# Patient Record
Sex: FEMALE | Race: WHITE | Employment: OTHER | URBAN - METROPOLITAN AREA
[De-identification: names, ages, dates, MRNs, and addresses within clinical notes are randomized per-mention and may not be internally consistent; named-entity substitution may affect disease eponyms.]

---

## 2017-03-12 DIAGNOSIS — G89.4 CHRONIC PAIN SYNDROME: ICD-10-CM

## 2017-03-14 RX ORDER — DULOXETIN HYDROCHLORIDE 30 MG/1
CAPSULE, DELAYED RELEASE ORAL
Qty: 90 CAPSULE | Refills: 1 | Status: SHIPPED | OUTPATIENT
Start: 2017-03-14 | End: 2017-08-15

## 2017-03-14 NOTE — TELEPHONE ENCOUNTER
DULoxetine (CYMBALTA) 30 MG EC capsule      Last Written Prescription Date: 12/17/2016  Last Fill Quantity: 90, # refills: 1  Last Office Visit with [facility], [facility] or [facility] prescribing provider: 12/14/2016        BP Readings from Last 3 Encounters:   12/15/16 128/89   12/14/16 124/86   10/28/16 137/89     Pulse: (for Fetzima)  Creatinine   Date Value Ref Range Status   10/24/2016 0.87 0.52 - 1.04 mg/dL Final   ]    Last PHQ-9 score on record=   PHQ-9 SCORE 10/24/2016   Total Score 11

## 2017-04-23 ENCOUNTER — MYC MEDICAL ADVICE (OUTPATIENT)
Dept: FAMILY MEDICINE | Facility: CLINIC | Age: 47
End: 2017-04-23

## 2017-04-25 ENCOUNTER — MYC MEDICAL ADVICE (OUTPATIENT)
Dept: FAMILY MEDICINE | Facility: CLINIC | Age: 47
End: 2017-04-25

## 2017-04-26 ENCOUNTER — OFFICE VISIT (OUTPATIENT)
Dept: FAMILY MEDICINE | Facility: CLINIC | Age: 47
End: 2017-04-26
Payer: COMMERCIAL

## 2017-04-26 ENCOUNTER — MYC MEDICAL ADVICE (OUTPATIENT)
Dept: FAMILY MEDICINE | Facility: CLINIC | Age: 47
End: 2017-04-26

## 2017-04-26 VITALS
SYSTOLIC BLOOD PRESSURE: 124 MMHG | WEIGHT: 196.3 LBS | TEMPERATURE: 98.5 F | HEART RATE: 84 BPM | BODY MASS INDEX: 34.78 KG/M2 | HEIGHT: 63 IN | DIASTOLIC BLOOD PRESSURE: 89 MMHG | OXYGEN SATURATION: 97 %

## 2017-04-26 DIAGNOSIS — G56.03 BILATERAL CARPAL TUNNEL SYNDROME: ICD-10-CM

## 2017-04-26 DIAGNOSIS — G57.53 TARSAL TUNNEL SYNDROME OF BOTH LOWER EXTREMITIES: ICD-10-CM

## 2017-04-26 DIAGNOSIS — G56.03 BILATERAL CARPAL TUNNEL SYNDROME: Primary | ICD-10-CM

## 2017-04-26 DIAGNOSIS — L81.9 HYPERPIGMENTATION: ICD-10-CM

## 2017-04-26 DIAGNOSIS — M79.7 FIBROMYALGIA: Primary | ICD-10-CM

## 2017-04-26 PROCEDURE — 99215 OFFICE O/P EST HI 40 MIN: CPT | Performed by: INTERNAL MEDICINE

## 2017-04-26 RX ORDER — DIAZEPAM 5 MG
5 TABLET ORAL EVERY 6 HOURS PRN
Qty: 30 TABLET | Refills: 0 | Status: SHIPPED | OUTPATIENT
Start: 2017-04-26 | End: 2018-04-30

## 2017-04-26 NOTE — MR AVS SNAPSHOT
After Visit Summary   4/26/2017    Maegan Lira    MRN: [id number]           Patient Information     Date Of Birth          1970        Visit Information        Provider Department      4/26/2017 12:30 PM Jelly Mcarthur,  [facility]        Today's Diagnoses     Fibromyalgia    -  1    Hyperpigmentation        Bilateral carpal tunnel syndrome        Tarsal tunnel syndrome of both lower extremities          Care Instructions      Schedule appointment with dermatologist.    Follow up with pain clinic to review medications.    After following up with the pain clinic we can see if following up with OT again would be beneficial.   Follow up with me as needed over the next couple of weeks.         Follow-ups after your visit        Additional Services     DERMATOLOGY REFERRAL       Your provider has referred you to: [facility]: [facility] [phone]   [email or web address]    Please be aware that coverage of these services is subject to the terms and limitations of your health insurance plan.  Call member services at your health plan with any benefit or coverage questions.      Please bring the following with you to your appointment:    (1) Any X-Rays, CTs or MRIs which have been performed.  Contact the facility where they were done to arrange for  prior to your scheduled appointment.    (2) List of current medications  (3) This referral request   (4) Any documents/labs given to you for this referral                  Who to contact     If you have questions or need follow up information about today's clinic visit or your schedule please contact [facility] directly at [phone].  Normal or non-critical lab and imaging results will be communicated to you by MyChart, letter or phone within 4 business days after the clinic has received the results. If you do not hear from us within 7 days, please contact the clinic through [email or web address] or  "phone. If you have a critical or abnormal lab result, we will notify you by phone as soon as possible.  Submit refill requests through [employer] or call your pharmacy and they will forward the refill request to us. Please allow 3 business days for your refill to be completed.          Additional Information About Your Visit        [employer] Information     [employer] gives you secure access to your electronic health record. If you see a primary care provider, you can also send messages to your care team and make appointments. If you have questions, please call your primary care clinic.  If you do not have a primary care provider, please call [phone] and they will assist you.        Care EveryWhere ID     This is your Care EveryWhere ID. This could be used by other organizations to access your [facility] medical records  [id number]        Your Vitals Were     Pulse Temperature Height Last Period Pulse Oximetry BMI (Body Mass Index)    84 98.5  F (36.9  C) (Oral) 5' 2.5\" (1.588 m) 04/15/2017 (Exact Date) 97% 35.33 kg/m2       Blood Pressure from Last 3 Encounters:   04/26/17 124/89   12/15/16 128/89   12/14/16 124/86    Weight from Last 3 Encounters:   04/26/17 196 lb 4.8 oz (89 kg)   12/15/16 203 lb (92.1 kg)   12/14/16 202 lb (91.6 kg)              We Performed the Following     DEPRESSION ACTION PLAN (DAP)     DERMATOLOGY REFERRAL          Today's Medication Changes          These changes are accurate as of: 4/26/17  1:20 PM.  If you have any questions, ask your nurse or doctor.               Stop taking these medicines if you haven't already. Please contact your care team if you have questions.     Sumatriptan &Capsaicin-Menthol 100 & 0.0375-5 MG & % Thpk   Stopped by:  Jelly Mcarthur, DO                Where to get your medicines      Some of these will need a paper prescription and others can be bought over the counter.  Ask your nurse if you have questions.     Bring a paper prescription for each of these " medications     diazepam 5 MG tablet                Primary Care Provider Office Phone # Fax #    Jelly Mcarthur [phone]       [facility] [address] [facility] [address]  [facility] [address]        Thank you!     Thank you for choosing [facility]  for your care. Our goal is always to provide you with excellent care. Hearing back from our patients is one way we can continue to improve our services. Please take a few minutes to complete the written survey that you may receive in the mail after your visit with us. Thank you!             Your Updated Medication List - Protect others around you: Learn how to safely use, store and throw away your medicines at www.disposemymeds.org.          This list is accurate as of: 4/26/17  1:20 PM.  Always use your most recent med list.                   Brand Name Dispense Instructions for use    Butalbital-APAP-Caffeine -40 MG Caps     30 capsule    Take one capsule as needed for migraine. Max one capsule daily. Do not take with opiate pain medications including oxycodone.       diazepam 5 MG tablet    VALIUM    30 tablet    Take 1 tablet (5 mg) by mouth every 6 hours as needed for anxiety, sleep or muscle spasms If you take this medication, do not take any other muscle relaxants.       DULoxetine 30 MG EC capsule    CYMBALTA    90 capsule    TAKE 1 CAPSULE BY MOUTH EVERY DAY       hydrOXYzine 25 MG tablet    ATARAX    60 tablet    Take 1-2 tablets (25-50 mg) by mouth every 6 hours as needed for itching       metaxalone 800 MG tablet    SKELAXIN    270 tablet    Take 0.5-1 tablets (400-800 mg) by mouth 3 times daily as needed for moderate pain       naproxen 375 MG tablet    NAPROSYN    60 tablet    Take 1 tablet (375 mg) by mouth 2 times daily as needed for moderate pain       omeprazole 20 MG tablet     30 tablet    Take 1 tablet (20 mg) by mouth daily as needed       ondansetron 4 MG ODT tab    ZOFRAN-ODT    30 tablet    Take 1 tablet  (4 mg) by mouth daily as needed for nausea       senna-docusate 8.6-50 MG per tablet    KATIANA-COLACE    90 tablet    Take 1-2 tablets by mouth 2 times daily as needed for constipation       UNABLE TO FIND      Take 10 drops by mouth 2 times daily MEDICATION NAME: CBD Max Hemp Oil

## 2017-04-26 NOTE — NURSING NOTE
"Chief Complaint   Patient presents with     Physical       Initial /89 (BP Location: Right arm, Patient Position: Chair, Cuff Size: Adult Large)  Pulse 84  Temp 98.5  F (36.9  C) (Oral)  Ht 5' 2.5\" (1.588 m)  Wt 196 lb 4.8 oz (89 kg)  LMP 04/15/2017 (Exact Date)  SpO2 97%  BMI 35.33 kg/m2 Estimated body mass index is 35.33 kg/(m^2) as calculated from the following:    Height as of this encounter: 5' 2.5\" (1.588 m).    Weight as of this encounter: 196 lb 4.8 oz (89 kg).  Medication Reconciliation: complete   Candy Brian MA  "

## 2017-04-26 NOTE — TELEPHONE ENCOUNTER
PCP: Patient requesting referral for Neurology  Have started to pend order for your completion.     Please see [employer] message below regarding other concerns as well.    Jennifer Lee RN

## 2017-04-26 NOTE — PROGRESS NOTES
SUBJECTIVE:                                                    Maegan Lira is a 46 year old female who presents to clinic today in f/u fibromyalgia     Chief Complaint   Patient presents with     Follow Up For     Fibromyalgia      Junie has a lot on her mind today. See prior MyChart encounters re: [place] and focused therapy, she still doesn't feel good from musculoskeletal perspective.  Reviewed her dx of Sjogren's per our [place] [facility]umatologist.  Over the winter she reports that she has noticed skin discoloration with patches of skin darkening on her arms and chest--she has noticed that when she was more tanned she notices the discoloration more. Pt is not currently taking hormone therapy.   Junie reports that she continues to feel fatigued and has chronic pain. She continues on lower dose Cymbalta but still having some mild visual disturbance.  She continues to take Atarax as needed for flare ups with good effect.   Pt reports that she has difficulty with most muscle relaxants as they make her sleepy, she is currently taking metaxalone twice daily and CBD oil daily which helped her wean off of the opiate.  She finds that swimming feels good to her and she continues to walk daily. She feels that she was eating better and [place] but still never [place].   She reports that if she sits too long she begins to limp when she stands up due to pain with walking.   Junie feels that he mood is stable and she does not feel very stressed. She feels that she has had two episodes of depression over the last two years-- she denies feeling any recent episodes of depression. I really pressed her about this b/c of possibility of depression/anxiety overlay contributing to a lot of her physical symptoms.   Previously had massage and physical therapy with good effect and is wondering if it would be beneficial to begin that again.  Junie is  "wondering if she should follow up with a neurologist due to bilateral tingling in arms and that occupational therapy suggested it may be coming from her neck and not just carpal tunnel. She feels tingling in her feet too but that is situational d/t when she is having massage over the legs only.    Problem list and histories reviewed & adjusted, as indicated.    ROS  Detailed as above    This document serves as a record of the services and decisions personally performed and made by Jelly Mcarthur DO. It was created on his/her behalf by Anisha Tomlinson, a trained medical scribe. The creation of this document is based the provider's statements to the medical scribe.  Scribe Anisha Tomlinson 12:46 PM, April 26, 2017    OBJECTIVE:                                                    /89 (BP Location: Right arm, Patient Position: Chair, Cuff Size: Adult Large)  Pulse 84  Temp 98.5  F (36.9  C) (Oral)  Ht 5' 2.5\" (1.588 m)  Wt 196 lb 4.8 oz (89 kg)  LMP 04/15/2017 (Exact Date)  SpO2 97%  BMI 35.33 kg/m2  Bright and pleasant, NAD  Denies depression and anxiety  Has much to talk about and multiple concerns  Appears to have melasma on face with some more splotchy patches on arms and also some hypopigmentation there too    ASSESSMENT/PLAN:                                                    1. Fibromyalgia  Discussed extensively; she had many thoughts/concerns  Will follow up with pain clinic now that she is back at [place] though suspect that she could probably eventually back down from that per her request. She wants to consider tapering off of Skelaxin.  Has followed with rheumatology--diagnosed with sjogrens but that does not relate to her chronic pain  She says she was dx with Fibromyalgia years ago and she is not sure if that is the correct dx or not  Pt is interested in [employer] trial where if she is tested positive for the FMa biomarker, then she could be enrolled in a study trialing a vaccine - this is " through [facility] and [facility]  Form completed  Takes Valium very rarely (ex: #30 per year) - Rx given to her today  Continue Cymbalta and exercise, massage  Takes rare Atarax too  Denies worsening of mood/anxiety when I asked her  - diazepam (VALIUM) 5 MG tablet; Take 1 tablet (5 mg) by mouth every 6 hours as needed for anxiety, sleep or muscle spasms If you take this medication, do not take any other muscle relaxants.  Dispense: 30 tablet; Refill: 0    2. Hyperpigmentation  Likely melasma on the face, will follow up with dermatology re: discoloration on arms - all likely r/t her sun exposure.   Reassured her that this does not have the appearance of lupus.  - DERMATOLOGY REFERRAL    3. Bilateral carpal tunnel syndrome  Previous occupational therapy effective but still struggling and she wonders if r/t slipped discs in her C-spine.    Did not spend much time on this today and offered a repeat OV to discuss in more detail.    4. Tarsal tunnel syndrome of both lower extremities  Per Podiatry and h/o past MRIs - she just brought this up as FYI    Patient Instructions     Schedule appointment with dermatologist.    Follow up with pain clinic to review medications.    After following up with the pain clinic we can see if following up with OT again would be beneficial.   Follow up with me as needed over the next couple of weeks.     MDM 45 minutes spent with patient, over 50% time counseling, coordinating care and explaining about nature of the patient's conditions.    The information in this document, created by the medical scribe for me, accurately reflects the services I personally performed and the decisions made by me. I have reviewed and approved this document for accuracy prior to leaving the patient care area.  Jelly Mcarthur DO  12:46 PM, 04/26/17    Jelly Mcarthur, DO   [facility]    Patient answers are not available for this visit.

## 2017-04-26 NOTE — PROGRESS NOTES
"   SUBJECTIVE:     CC: Maegan Lira is an 46 year old woman who presents for preventive health visit.     Physical   Annual:     Getting at least 3 servings of Calcium per day::  Yes    Bi-annual eye exam::  Yes    Dental care twice a year::  Yes    Sleep apnea or symptoms of sleep apnea::  Daytime drowsiness    Diet::  Regular (no restrictions)    Frequency of exercise::  4-5 days/week    Duration of exercise::  45-60 minutes    Taking medications regularly::  Yes    Medication side effects::  Other    Additional concerns today::  YES    {Outside tests to abstract? :456736}    {additional problems to add:781732}    Today's PHQ-2 Score:   PHQ-2 ( 1999 [employer]) 4/23/2017   Q1: Little interest or pleasure in doing things -   Q2: Feeling down, depressed or hopeless -   PHQ-2 Score -   Little interest or pleasure in doing things Not at all   Feeling down, depressed or hopeless Not at all   PHQ-2 Score 0       Abuse: Current or Past(Physical, Sexual or Emotional)- {YES/NO/NA:038836}  Do you feel safe in your environment - {YES/NO/NA:317408}    Social History   Substance Use Topics     Smoking status: Never Smoker     Smokeless tobacco: Never Used     Alcohol use 0.0 oz/week     0 Standard drinks or equivalent per week      Comment: occas     {ETOH AUDIT:435494}    Recent Labs   Lab Test 02/24/15   CHOL  206*   HDL  71   LDL  117   TRIG  91       Reviewed orders with patient.  Reviewed health maintenance and updated orders accordingly - {Yes/No:677590::\"Yes\"}    Mammo Decision Support:  {Mammo:939744}    Pertinent mammograms are reviewed under the imaging tab.  History of abnormal Pap smear: {PAP HX:791506}    Reviewed and updated as needed this visit by clinical staff         Reviewed and updated as needed this visit by Provider        {HISTORY OPTIONS:612202}    ROS:  {FEMALE PREVENTATIVE ROS:390444}    {CHRONICPROBDATA:469830}  OBJECTIVE:     There were no vitals taken for this visit.  EXAM:  {Exam " "Choices:216259}    ASSESSMENT/PLAN:     {Diag Picklist:833458}    COUNSELING:  {FEMALE COUNSELING MESSAGES:432307::\"Reviewed preventive health counseling, as reflected in patient instructions\"}    {Blood Pressure Screenin}     reports that she has never smoked. She has never used smokeless tobacco.  {Tobacco Cessation needed for ACO -- Delete if patient is a non-smoker:041743}  Estimated body mass index is 35.96 kg/(m^2) as calculated from the following:    Height as of 16: 5' 3\" (1.6 m).    Weight as of 12/15/16: 203 lb (92.1 kg).   {Weight Management Plan needed for ACO:173664}    Counseling Resources:  ATP IV Guidelines  Pooled Cohorts Equation Calculator  Breast Cancer Risk Calculator  FRAX Risk Assessment  ICSI Preventive Guidelines  Dietary Guidelines for Americans,   [employer]'s MyPlate  ASA Prophylaxis  Lung CA Screening    Jelly Mcarthur, [facility]  Answers for HPI/ROS submitted by the patient on 2017   Q1: Little interest or pleasure in doing things: 0=Not at all  Q2: Feeling down, depressed or hopeless: 0=Not at all  PHQ-2 Score: 0    "

## 2017-04-26 NOTE — PATIENT INSTRUCTIONS
Schedule appointment with dermatologist.    Follow up with pain clinic to review medications.    After following up with the pain clinic we can see if following up with OT again would be beneficial.   Follow up with me as needed over the next couple of weeks.

## 2017-05-01 ENCOUNTER — MEDICAL CORRESPONDENCE (OUTPATIENT)
Dept: HEALTH INFORMATION MANAGEMENT | Facility: CLINIC | Age: 47
End: 2017-05-01

## 2017-05-01 ENCOUNTER — TELEPHONE (OUTPATIENT)
Dept: FAMILY MEDICINE | Facility: CLINIC | Age: 47
End: 2017-05-01

## 2017-05-01 ENCOUNTER — TRANSFERRED RECORDS (OUTPATIENT)
Dept: HEALTH INFORMATION MANAGEMENT | Facility: CLINIC | Age: 47
End: 2017-05-01

## 2017-05-02 PROBLEM — M35.00 SJOGREN'S SYNDROME (H): Status: ACTIVE | Noted: 2017-05-02

## 2017-05-02 PROBLEM — G57.53 TARSAL TUNNEL SYNDROME OF BOTH LOWER EXTREMITIES: Status: ACTIVE | Noted: 2017-05-02

## 2017-05-02 PROBLEM — L81.1 MELASMA: Status: ACTIVE | Noted: 2017-05-02

## 2017-05-02 NOTE — TELEPHONE ENCOUNTER
Form for [employer] filled out and placed in outgoing forms box to please be faxed to number on the form  Thanks!

## 2017-05-08 ENCOUNTER — MYC MEDICAL ADVICE (OUTPATIENT)
Dept: PALLIATIVE MEDICINE | Facility: CLINIC | Age: 47
End: 2017-05-08

## 2017-05-09 NOTE — TELEPHONE ENCOUNTER
Derrek message from Created: 5/8/2017 7:35 PM    Ajay Ochoa,  We have returned from the [facility] & I have an appointment with you on the 12th. To save time I thought it best to email you my progress update. Although my 4 month journey into warmer weather was not the  miracle  cure I was hoping it would be, I was able to be much more active, ate , ate less & did feel overall better in the warmer climate. I consistently did my PT exercises, stretches, pool therapy, walking & swimming. [place] about 5 [place] without any MT the pain was becoming increasingly unbearable. I found a local massage center & began going once a week. It seems that no matter what activities I perform, I get a build-up of lactic acid or something that will not ease up on its own without physical manipulation. I spent most of last year having occupational arm therapy once a week on each arm & NMT once a week. Dr. Mcarthur wanted your opinion on my pain management therapy prior to her referring me back to the arm  therapist. The treatment with the arm therapist isn't intended to be an ongoing therapy & even though I showed progress, it was very slow & I am sure the insurance company would not be happy with me having weekly visits.     My test results came back positive for Sjogren s disease. The rheumatologist stated that the joint pain I have is not a result of this autoimmune disease though. However, my Dentist confirmed that my loose teeth are a result of this disease, they have a few other patients with the same problem. I do have the eye and mouth issues associated with this disease, but there are many other symptoms of the disease that mimick Fibro. I am interested in a relatively new fibro blood test called FM/a that is performed by [employer] [employer] [facility] in [place]. The test looks for protein molecules in the blood called chemokines & cytokines. My insurance does not cover it, and the cost  is almost $1,000. All of the necessary information is available on their website [email or web address]. I am wondering if you have heard of this & if you deem this test credible? I feel like it would solidify the prior diagnoses of FM and put my mind at ease from the worry & concern that I am struggling with something else. Dr. Mcarthur said  she would authorize it if I want to proceed, but I really want your opinion.  I am looking forward to seeing you. You don't need to write back since I will be in this week, I just wanted to fill you in on the missing months since I have seen you.  Thanks, Junie

## 2017-05-12 ENCOUNTER — OFFICE VISIT (OUTPATIENT)
Dept: PALLIATIVE MEDICINE | Facility: CLINIC | Age: 47
End: 2017-05-12
Payer: COMMERCIAL

## 2017-05-12 VITALS
SYSTOLIC BLOOD PRESSURE: 122 MMHG | RESPIRATION RATE: 16 BRPM | OXYGEN SATURATION: 98 % | WEIGHT: 198 LBS | DIASTOLIC BLOOD PRESSURE: 90 MMHG | BODY MASS INDEX: 35.64 KG/M2 | HEART RATE: 84 BPM

## 2017-05-12 DIAGNOSIS — M54.12 CERVICAL RADICULOPATHY: Primary | ICD-10-CM

## 2017-05-12 PROCEDURE — 99214 OFFICE O/P EST MOD 30 MIN: CPT | Performed by: NURSE PRACTITIONER

## 2017-05-12 NOTE — PROGRESS NOTES
[facility]    CHIEF COMPLAINT: Chronic pain, chronic fatigue    INTERVAL HISTORY:  Last seen on 10/28/17.        Recommendations/plan at the last visit included:  1. Continue Pool Physical therapy visits  2. Schedule follow-up with TONI Mccullough NP-C in 4-6 weeks  3. Look at vegan and candida overgrowth food plans.   4. Medication recommendations:   1. Refill given for Percocet.     Since her last visit, Maegan Lira reports:  - Has been out of the country at her vacation home over the winter. Was diagnosed with Sjogren's Disease. Rheum does not think this is responsible for her joint pain. They do think it is the cause of her dental and eye issues. Noting some increased depression. PCP recommended Neuro follow up for N/T in UE and LE.    Pain Information:   Pain quality: Aching, Burning, Exhausting, Miserable, Nagging, Numb, Penetrating, Sharp, Shooting, Stabbing, Tender, Throbbing, Tiring and Unbearable    Pain timing: worst in the evening and at night     Pain rating: intensity ranges from 5/10 to 8/10, and averages 7/10 on a 0-10 scale.   Aggravating factors include: Stress, too much activity   Relieving factors include: Massage, stretching,swimming    Annual Controlled Substance Agreement/UDS due date: N/A    CURRENT RELEVANT PAIN MEDICATIONS:   Metaxalone 800 mg 3 times daily  Topiramate 50 mg twice daily  Hydroxyzine 25 mg. Taking 1 1/2 tabs at HS.  Cymbalta 60 mg daily.   Naproxen:  mg B.i.d.  OTC CBD hemp oil     Patient is using the medication as prescribed: YES  Is your medication helpful? YES   Medication side effects? Fatigue        Previous Pain Relevant Medications: (H--helped; HI--Helped initially; SWH--Somewhat helpful; NH--No help; W--worse; SE--side effects; ?--Unsure if helpful)   NOTE: This medication information taken from patient's intake form, not medical records.   Opiates: Codeine: allergic, Hydrocodone: SE N/V, Hydromorphone: SE N/V, Morphine: SE  N/V. Tolerates IV but not PO  NSAIDS: Ibuprofen: NH, Naproxen: SWH  Muscle Relaxants: Flexeril: SE sedation, Tizanidine: SE sedation   Anti-migraine mediations: Topiramate: H  Anti-depressants: Citalopram: NH  Sleep aids:Diazepam: H, takes about 1x mo, Zolpidem: NH  Anti-convulsants: Gabapentin: Cognitive SE, took x1 mo   Topicals: none  Other medications not covered above: none     Any illicit drug use: Occasional marijuana, a few times per year. Patient is aware that we prefer she not use marijuana while treating with the pain clinic.  Any use of prescriptions other than how they were prescribed: vito  [place] Board of Pharmacy Data Base Reviewed: YES; No concern for abuse or misuse of controlled medications based on this report.      SELF CARE:   How often do you practice SELF-CARE (relaxing, stretching, pacing, monitoring posture, taking mini-breaks) in a typical day: Several times per day       Medications:  Current Outpatient Prescriptions   Medication Sig Dispense Refill     diazepam (VALIUM) 5 MG tablet Take 1 tablet (5 mg) by mouth every 6 hours as needed for anxiety, sleep or muscle spasms If you take this medication, do not take any other muscle relaxants. 30 tablet 0     DULoxetine (CYMBALTA) 30 MG EC capsule TAKE 1 CAPSULE BY MOUTH EVERY DAY 90 capsule 1     omeprazole 20 MG tablet Take 1 tablet (20 mg) by mouth daily as needed 30 tablet 3     ondansetron (ZOFRAN-ODT) 4 MG ODT tab Take 1 tablet (4 mg) by mouth daily as needed for nausea 30 tablet 3     hydrOXYzine (ATARAX) 25 MG tablet Take 1-2 tablets (25-50 mg) by mouth every 6 hours as needed for itching 60 tablet 3     UNABLE TO FIND Take 10 drops by mouth 2 times daily MEDICATION NAME: CBD Max Hemp Oil       senna-docusate (KATIANA-COLACE) 8.6-50 MG per tablet Take 1-2 tablets by mouth 2 times daily as needed for constipation 90 tablet 3     Butalbital-APAP-Caffeine -40 MG CAPS Take one capsule as needed for migraine. Max one capsule daily.  Do not take with opiate pain medications including oxycodone. 30 capsule 1     metaxalone (SKELAXIN) 800 MG tablet Take 0.5-1 tablets (400-800 mg) by mouth 3 times daily as needed for moderate pain 270 tablet 3     naproxen (NAPROSYN) 375 MG tablet Take 1 tablet (375 mg) by mouth 2 times daily as needed for moderate pain 60 tablet 3       Review of Systems: A 10-point review of systems was negative, with the exception of chronic pain issues.      Social History: Reviewed; unchanged from initial consultation.      Family history: Reviewed; unchanged from initial consultation.     PHYSICAL EXAM     Vitals:    05/12/17 1500   BP: 122/90   BP Location: Left arm   Patient Position: Chair   Cuff Size: Adult Small   Pulse: 84   Resp: 16   SpO2: 98%   Weight: 89.8 kg (198 lb)       Constitutional: healthy, alert and no distress  HEENT: Head atraumatic, normocephalic. Eyes without conjunctival injection or jaundice. Neck supple. No obvious neck masses  : Deferred  Skin: No rash, lesions, or petechiae of exposed skin.   Extremities: Peripheral pulses intact. No clubbing, cyanosis, or edema.   Psychiatric/mental status: Alert, without lethargy or stupor. Appropriate affect. Mood normal.   Neurologic exam:  CN: Cranial nerves 2-12 are grossly normal.     Musculoskeletal exam:  Cervical spine:  Flex: 30 degrees  Ext: 30 degrees  Rotation to right: 30 degrees  Rotation to left:: 30 degrees  Rotation/ext to right: pain free  Rotation/ext to left:: pain free  Tenderness in the cervical spine at midline. No  Tenderness in the cervical paraspinal muscles. No  Thoracic spine:   Kyphosis. No  Tenderness in the thoracic spine at midline. No  Tenderness in the thoracic paraspinal muscles. No  Lumbar/Sacral spine:  Forward Flexion: 90 degrees  Ext: 30 degrees  Rotation/ext to right: painful   Rotation/ext to left:: painful   Lordosis. No  Tenderness in the lumbar spine at midline. Yes  Tenderness in the lumbar paraspinal  "muscles.Yes     Psychiatric: mentation appears normal., affect and mood normal     DIRE Score for ongoing opioid management is calculated as follows:  Diagnosis = 2 pts (slowly progressive; moderate pain/objective findings)  Intractability = 3 pts (patient fully engaged but inadequate response to treatments)  Risk   Psych = 3 pts (no significant personality dysfunction/mental illness; good communication with clinic)   Chem Hlth = 3 pts (no history of chemical dependency; not drug-focused)  Reliability = 3 pts (highly reliable with meds, appointments, treatments)  Social = 3 pts (supportive family/close relationships; involved in work/school; no isolation)  (Psych + Chem hlth + Reliability + Social) = 17   Efficacy = 2 pts (moderate benefit/function; low med dose; too early/not tried meds)  DIRE Score = 19   7-13: likely NOT suitable candidate for long-term opioid analgesia  14-21: may be a suitable candidate for long-term opioid analgesia        Previous Diagnostic Tests:   Imaging Studies:   See scanned report of lumbar MRI dated 7/20/15     ASSESSMENT:   1. Lumbar back pain with right L2 nerve impingement noted on 7/2015 MRI  2. Generalized myofacial pain  3. B carpal tunnel syndrome  4. B tarsal tunnel syndrome    Junie returns after spending the winter the  [place]. She initally had less pain while there but eventually, her pain came back. Pain is most severe in neck and bilateral arms. We don't have recent imaging of her cervical spine, so will get that to rule out cervical radiculopathy. Reviewed the importance of healthy diet and weight, resources provided.     Plan:    Diagnosis reviewed, treatment option addressed, and risk/benifits discussed.  Self-care instructions given.  I am recommending a multidisciplinary treatment plan to help this patient better manage pain.      1. Schedule follow-up with OTNI Mccullough, NP-C in 1 week after MRI  2. Watch \"[place] over [place]\" documentary. Look into " Gluten Free diet. Look at [email or web address] for vegan recipes.   3. Imaging: MRI of cervical spine  4. Medication recommendations:   1. No change to current medications.     Total time spent face to face was 30 minutes and more than 50% of face to face time was spent in counseling and/or coordination of care regarding the diagnosis and recommendations above.      TONI Fournier, NP-C   [facility]

## 2017-05-12 NOTE — PATIENT INSTRUCTIONS
"After Visit Instructions:     Thank you for coming to [facility] for your care. It is my goal to partner with you to help you reach your optimal state of health.     I am recommending multidisciplinary care at this time.  The focus of care will be to continue gradual rehabilitation and pain management with medication adjustments as needed.    Continue daily self-care, identifying contributing factors, and monitoring variations in pain level. Continue to integrate self-care into your life.      1. Schedule follow-up with TONI Mccullough NP-C in 1 week after MRI  2. Watch \"[place] over Knives\" documentary. Look into Gluten Free diet. Look at [email or web address] for vegan recipes.   3. Imaging: MRI of cervical spine  4. Medication recommendations:   1. No change to current medications.     TONI Fournier NP-C  [facility]  [facility]    Contact information: [facility]    Please call if any side effects, questions, or concerns arise.    Nurse Triage line:  [phone]   Call this number with any questions or concerns. You may leave a detailed message anytime. Calls are typically returned Monday through Friday between 8 AM and 4:30 PM. We usually get back to you within 2 business days depending on the issue/request.    Scheduling number: [phone].  Call this number to schedule or change appointments.          Medication Refills Policy:    For non-narcotic medications, please your pharmacy directly to request a refill and the pharmacy will call the [facility] for authorization. Please allow 3-4 days for these refills.    For narcotic refills, call the nurse triage line and leave a message requesting your refill along with the name of the pharmacy that you use. Narcotic prescriptions will be mailed to your pharmacy or you may pick them up at the clinic.  Please call 7-10 days before your refill is due  The above policy allows adequate " time so that you do not run out of medication.    No Show - Late Cancellation - Late Arrival Policy  We believe regular attendance is key to your success in our program.    Any time you are unable to keep your appointment we ask that you call us at  least 24 hours in advance to let us know. This will allow us to offer the appointment time to another patient. The following is our policy for missed appointments. This also applies to appointments cancelled with less than 24 hours notice.    You will receive a letter after missing your 1st and 2nd appointments without contacting the clinic before your scheduled appointment time.     After missing 3 appointments without calling first, we will cancel all of your future appointments at [facility].    At that point, you will not be able to resume services unless approved by your care team  We understand that unforseen circumstances arise, however, out of respect for all concerned and to provide this appointment to another patient, this policy will be enforced.    Please note that most follow up appointments are 30 minutes long. If you arrive late, your provider may not be able to see you for the entire 30 minutes. Please also note that if you arrive more than 15 minutes for any appointment, it may be rescheduled.

## 2017-05-12 NOTE — NURSING NOTE
"Chief Complaint   Patient presents with     Pain       Initial /90 (BP Location: Left arm, Patient Position: Chair, Cuff Size: Adult Small)  Pulse 84  Resp 16  Wt 89.8 kg (198 lb)  LMP 04/15/2017 (Exact Date)  SpO2 98%  BMI 35.64 kg/m2 Estimated body mass index is 35.64 kg/(m^2) as calculated from the following:    Height as of 4/26/17: 1.588 m (5' 2.5\").    Weight as of this encounter: 89.8 kg (198 lb).  Medication Reconciliation: complete     Zuleika Carter MA  [facility]      "

## 2017-05-12 NOTE — MR AVS SNAPSHOT
"              After Visit Summary   5/12/2017    Maegan Lira    MRN: [id number]           Patient Information     Date Of Birth          1970        Visit Information        Provider Department      5/12/2017 3:00 PM Gabriela Kincaid APRN CNP [facility]        Today's Diagnoses     Cervical radiculopathy    -  1      Care Instructions    After Visit Instructions:     Thank you for coming to [facility] for your care. It is my goal to partner with you to help you reach your optimal state of health.     I am recommending multidisciplinary care at this time.  The focus of care will be to continue gradual rehabilitation and pain management with medication adjustments as needed.    Continue daily self-care, identifying contributing factors, and monitoring variations in pain level. Continue to integrate self-care into your life.      1. Schedule follow-up with TONI Mccullough NP-C in 1 week after MRI  2. Watch \"[place] over Knives\" documentary. Look into Gluten Free diet. Look at [email or web address] for vegan recipes.   3. Imaging: MRI of cervical spine  4. Medication recommendations:   1. No change to current medications.     TONI Fournier, NP-C  [facility]    Contact information: [facility]    Please call if any side effects, questions, or concerns arise.    Nurse Triage line:  [phone]   Call this number with any questions or concerns. You may leave a detailed message anytime. Calls are typically returned Monday through Friday between 8 AM and 4:30 PM. We usually get back to you within 2 business days depending on the issue/request.    Scheduling number: [phone].  Call this number to schedule or change appointments.          Medication Refills Policy:    For non-narcotic medications, please your pharmacy directly to request a refill and the pharmacy will call the [facility] " for authorization. Please allow 3-4 days for these refills.    For narcotic refills, call the nurse triage line and leave a message requesting your refill along with the name of the pharmacy that you use. Narcotic prescriptions will be mailed to your pharmacy or you may pick them up at the clinic.  Please call 7-10 days before your refill is due  The above policy allows adequate time so that you do not run out of medication.    No Show - Late Cancellation - Late Arrival Policy  We believe regular attendance is key to your success in our program.    Any time you are unable to keep your appointment we ask that you call us at  least 24 hours in advance to let us know. This will allow us to offer the appointment time to another patient. The following is our policy for missed appointments. This also applies to appointments cancelled with less than 24 hours notice.    You will receive a letter after missing your 1st and 2nd appointments without contacting the clinic before your scheduled appointment time.     After missing 3 appointments without calling first, we will cancel all of your future appointments at [facility].    At that point, you will not be able to resume services unless approved by your care team  We understand that unforseen circumstances arise, however, out of respect for all concerned and to provide this appointment to another patient, this policy will be enforced.    Please note that most follow up appointments are 30 minutes long. If you arrive late, your provider may not be able to see you for the entire 30 minutes. Please also note that if you arrive more than 15 minutes for any appointment, it may be rescheduled.                                   Follow-ups after your visit        Your next 10 appointments already scheduled     May 19, 2017  3:30 PM CDT   Office Visit with Jelly Mcarthur,    [facility]    [address] [facility]  [id number] [phone]           Bring a current list of meds and any records pertaining to this visit.  For Physicals, please bring immunization records and any forms needing to be filled out.  Please arrive 10 minutes early to complete paperwork.              Future tests that were ordered for you today     Open Future Orders        Priority Expected Expires Ordered    MR Cervical Spine w/o Contrast Routine  5/12/2018 5/12/2017            Who to contact     If you have questions or need follow up information about today's clinic visit or your schedule please contact [facility] directly at [phone].  Normal or non-critical lab and imaging results will be communicated to you by [employer] [employer], letter or phone within 4 business days after the clinic has received the results. If you do not hear from us within 7 days, please contact the clinic through [facility] or phone. If you have a critical or abnormal lab result, we will notify you by phone as soon as possible.  Submit refill requests through [facility] or call your pharmacy and they will forward the refill request to us. Please allow 3 business days for your refill to be completed.          Additional Information About Your Visit        MyChart Information     [facility] gives you secure access to your electronic health record. If you see a primary care provider, you can also send messages to your care team and make appointments. If you have questions, please call your primary care clinic.  If you do not have a primary care provider, please call [phone] and they will assist you.        Care EveryWhere ID     This is your Care EveryWhere ID. This could be used by other organizations to access your [facility] medical records  [id number]        Your Vitals Were     Pulse Respirations Last Period Pulse Oximetry BMI (Body Mass Index)       84 16 04/15/2017 (Exact Date) 98% 35.64 kg/m2        Blood Pressure from Last 3 Encounters:   05/12/17 122/90    04/26/17 124/89   12/15/16 128/89    Weight from Last 3 Encounters:   05/12/17 89.8 kg (198 lb)   04/26/17 89 kg (196 lb 4.8 oz)   12/15/16 92.1 kg (203 lb)               Primary Care Provider Office Phone # Fax #    Jelly Mcarthur [phone]       [facility] [address] [facility] [address]  [facility] [address]        Thank you!     Thank you for choosing [facility]  for your care. Our goal is always to provide you with excellent care. Hearing back from our patients is one way we can continue to improve our services. Please take a few minutes to complete the written survey that you may receive in the mail after your visit with us. Thank you!             Your Updated Medication List - Protect others around you: Learn how to safely use, store and throw away your medicines at www.disposemymeds.org.          This list is accurate as of: 5/12/17  3:38 PM.  Always use your most recent med list.                   Brand Name Dispense Instructions for use    Butalbital-APAP-Caffeine -40 MG Caps     30 capsule    Take one capsule as needed for migraine. Max one capsule daily. Do not take with opiate pain medications including oxycodone.       diazepam 5 MG tablet    VALIUM    30 tablet    Take 1 tablet (5 mg) by mouth every 6 hours as needed for anxiety, sleep or muscle spasms If you take this medication, do not take any other muscle relaxants.       DULoxetine 30 MG EC capsule    CYMBALTA    90 capsule    TAKE 1 CAPSULE BY MOUTH EVERY DAY       hydrOXYzine 25 MG tablet    ATARAX    60 tablet    Take 1-2 tablets (25-50 mg) by mouth every 6 hours as needed for itching       metaxalone 800 MG tablet    SKELAXIN    270 tablet    Take 0.5-1 tablets (400-800 mg) by mouth 3 times daily as needed for moderate pain       naproxen 375 MG tablet    NAPROSYN    60 tablet    Take 1 tablet (375 mg) by mouth 2 times daily as needed for moderate pain       omeprazole 20 MG tablet     30  tablet    Take 1 tablet (20 mg) by mouth daily as needed       ondansetron 4 MG ODT tab    ZOFRAN-ODT    30 tablet    Take 1 tablet (4 mg) by mouth daily as needed for nausea       senna-docusate 8.6-50 MG per tablet    KATIANA-COLACE    90 tablet    Take 1-2 tablets by mouth 2 times daily as needed for constipation       UNABLE TO FIND      Take 10 drops by mouth 2 times daily MEDICATION NAME: CBD Max Hemp Oil

## 2017-05-16 ENCOUNTER — MYC MEDICAL ADVICE (OUTPATIENT)
Dept: PALLIATIVE MEDICINE | Facility: CLINIC | Age: 47
End: 2017-05-16

## 2017-05-16 ENCOUNTER — HOSPITAL ENCOUNTER (OUTPATIENT)
Dept: MRI IMAGING | Facility: CLINIC | Age: 47
Discharge: HOME | End: 2017-05-16
Attending: NURSE PRACTITIONER | Admitting: NURSE PRACTITIONER
Payer: COMMERCIAL

## 2017-05-16 DIAGNOSIS — M54.12 CERVICAL RADICULOPATHY: ICD-10-CM

## 2017-05-16 PROCEDURE — 72141 MRI NECK SPINE W/O DYE: CPT

## 2017-05-16 NOTE — TELEPHONE ENCOUNTER
"Derrek message from patient Created: 5/16/2017 12:55 PM    Ajay Ochoa,  I just wanted to let you know that I am scheduled for the MRI you requested tonight at 7pm. I have a follow-up appointment scheduled with you for 5/24. I called the neurologist that Dr Mcarthur referred to me and their first availability was July 11th. Is that how long it is taking to get an appointment with the clinics you refer to? I scheduled the appointment, but if you think I should inquire elsewhere, let me know. Also, the referral they had on file was from Dr Mcarthur regarding my vision issues. It said nothing regarding my \"carpel tunnel\" stuff. Maybe she was unable to write (2) issues in the referral? Oh, I meant to ask this when we met last time, but why does your daughter need a bunch of appts at the [facility] if you think its Sjogrens? They casper blood in my normal clinic and that test detected it. I'm not trying to be nosy, I am just really curious since I was just diagnosed with this and wondering what the [facility] has to offer that the regular doctor doesn't. Especially since the  rheumatologist said my joint pain is NOT from the Sjogrens, even though it is a listed symptom. There is a lot that I still do not know about this disease, so I am just trying to educate myself as much as I can.  Thanks,  Junie"

## 2017-05-17 NOTE — TELEPHONE ENCOUNTER
Junie,     I am not sure of the availability of any other neurology clinics but I would guess that is an average time frame. Since Dr Mcarthur wrote the referral, I would suggest asking her to write a second referral noting the hand/wrist symptoms and note that you already have an appt but would like that included.     Take care,   TONI Fournier, NP-C  [facility]

## 2017-05-24 ENCOUNTER — OFFICE VISIT (OUTPATIENT)
Dept: PALLIATIVE MEDICINE | Facility: CLINIC | Age: 47
End: 2017-05-24
Payer: COMMERCIAL

## 2017-05-24 VITALS — SYSTOLIC BLOOD PRESSURE: 110 MMHG | HEART RATE: 87 BPM | RESPIRATION RATE: 16 BRPM | DIASTOLIC BLOOD PRESSURE: 72 MMHG

## 2017-05-24 DIAGNOSIS — G56.03 BILATERAL CARPAL TUNNEL SYNDROME: Primary | ICD-10-CM

## 2017-05-24 PROCEDURE — 99214 OFFICE O/P EST MOD 30 MIN: CPT | Performed by: NURSE PRACTITIONER

## 2017-05-24 ASSESSMENT — PAIN SCALES - GENERAL: PAINLEVEL: SEVERE PAIN (7)

## 2017-05-24 NOTE — NURSING NOTE
"Chief Complaint   Patient presents with     Pain       Initial /72  Pulse 87  Resp 16  LMP 04/15/2017 (Exact Date) Estimated body mass index is 35.64 kg/(m^2) as calculated from the following:    Height as of 4/26/17: 1.588 m (5' 2.5\").    Weight as of 5/12/17: 89.8 kg (198 lb).  Medication Reconciliation: complete       Zuleika Carter MA  [facility]      "

## 2017-05-24 NOTE — PROGRESS NOTES
"[facility]    CHIEF COMPLAINT: pain    INTERVAL HISTORY:  Last seen on 5/12/17.        Recommendations/plan at the last visit included:  1. Schedule follow-up with TONI Mccullough NP-C in 1 week after MRI  2. Watch \"[place]\" documentary. Look into Gluten Free diet. Look at  [email or web address] for vegan recipes.   3. Imaging: MRI of cervical spine  4. Medication recommendations:                  1. No change to current medications.     Since her last visit, Maegan Lira reports:  - She is looking into moving to a smaller space due to her inability to care for her home.     Pain Information:   Pain quality: Aching, Burning, Exhausting, Gnawing, Miserable, Nagging, Numb, Penetrating, Sharp, Shooting, Stabbing, Tender, Throbbing, Tiring and Unbearable    Pain timing: Constant     Pain rating: intensity ranges from 4/10 to 9/10, and averages 7/10 on a 0-10 scale.   Aggravating factors include: Too much ot too little activity, work, stress   Relieving factors include: Pool Thera, swimming, massage, stretching      Annual Controlled Substance Agreement/UDS due date: N/A     CURRENT RELEVANT PAIN MEDICATIONS:   Metaxalone 800 mg 3 times daily  Topiramate 50 mg twice daily  Hydroxyzine 25 mg. Taking 1 1/2 tabs at HS.  Cymbalta 60 mg daily.   Naproxen:  mg B.i.d.  OTC CBD hemp oil      Patient is using the medication as prescribed: YES  Is your medication helpful? YES   Medication side effects? Fatigue          Previous Pain Relevant Medications: (H--helped; HI--Helped initially; SWH--Somewhat helpful; NH--No help; W--worse; SE--side effects; ?--Unsure if helpful)   NOTE: This medication information taken from patient's intake form, not medical records.   Opiates: Codeine: allergic, Hydrocodone: SE N/V, Hydromorphone: SE N/V, Morphine: SE N/V. Tolerates IV but not PO  NSAIDS: Ibuprofen: NH, Naproxen: SWH  Muscle Relaxants: Flexeril: SE sedation, Tizanidine: SE sedation "   Anti-migraine mediations: Topiramate: H  Anti-depressants: Citalopram: NH  Sleep aids:Diazepam: H, takes about 1x mo, Zolpidem: NH  Anti-convulsants: Gabapentin: Cognitive SE, took x1 mo   Topicals: none  Other medications not covered above: none      Any illicit drug use: Occasional marijuana, a few times per year. Patient is aware that we prefer she not use marijuana while treating with the pain clinic.  Any use of prescriptions other than how they were prescribed: vito  [place] Board of Pharmacy Data Base Reviewed: YES; No concern for abuse or misuse of controlled medications based on this report.       SELF CARE:   How often do you practice SELF-CARE (relaxing, stretching, pacing, monitoring posture, taking mini-breaks) in a typical day: Several times per day    Medications:  Current Outpatient Prescriptions   Medication Sig Dispense Refill     diazepam (VALIUM) 5 MG tablet Take 1 tablet (5 mg) by mouth every 6 hours as needed for anxiety, sleep or muscle spasms If you take this medication, do not take any other muscle relaxants. 30 tablet 0     DULoxetine (CYMBALTA) 30 MG EC capsule TAKE 1 CAPSULE BY MOUTH EVERY DAY 90 capsule 1     omeprazole 20 MG tablet Take 1 tablet (20 mg) by mouth daily as needed 30 tablet 3     ondansetron (ZOFRAN-ODT) 4 MG ODT tab Take 1 tablet (4 mg) by mouth daily as needed for nausea 30 tablet 3     hydrOXYzine (ATARAX) 25 MG tablet Take 1-2 tablets (25-50 mg) by mouth every 6 hours as needed for itching 60 tablet 3     UNABLE TO FIND Take 10 drops by mouth 2 times daily MEDICATION NAME: CBD Max Hemp Oil       senna-docusate (KATIANA-COLACE) 8.6-50 MG per tablet Take 1-2 tablets by mouth 2 times daily as needed for constipation 90 tablet 3     Butalbital-APAP-Caffeine -40 MG CAPS Take one capsule as needed for migraine. Max one capsule daily. Do not take with opiate pain medications including oxycodone. 30 capsule 1     metaxalone (SKELAXIN) 800 MG tablet Take 0.5-1 tablets  (400-800 mg) by mouth 3 times daily as needed for moderate pain 270 tablet 3     naproxen (NAPROSYN) 375 MG tablet Take 1 tablet (375 mg) by mouth 2 times daily as needed for moderate pain 60 tablet 3       Review of Systems: A 10-point review of systems was negative, with the exception of chronic pain issues.      Social History: Reviewed; unchanged from initial consultation.      Family history: Reviewed; unchanged from initial consultation.     PHYSICAL EXAM    Vitals:    05/24/17 1103   BP: 110/72   Pulse: 87   Resp: 16       Constitutional: healthy, alert and no distress  HEENT: Head atraumatic, normocephalic. Eyes without conjunctival injection or jaundice. Neck supple. No obvious neck masses  : Deferred  Skin: No rash, lesions, or petechiae of exposed skin.   Extremities: Peripheral pulses intact. No clubbing, cyanosis, or edema.   Psychiatric/mental status: Alert, without lethargy or stupor. Appropriate affect. Mood normal.   Neurologic exam:  CN: Cranial nerves 2-12 are grossly normal.      Musculoskeletal exam:  Cervical spine:  Flex: 30 degrees  Ext: 30 degrees  Rotation to right: 30 degrees  Rotation to left:: 30 degrees  Rotation/ext to right: pain free  Rotation/ext to left:: pain free  Tenderness in the cervical spine at midline. No  Tenderness in the cervical paraspinal muscles. No  Thoracic spine:   Kyphosis. No  Tenderness in the thoracic spine at midline. No  Tenderness in the thoracic paraspinal muscles. No  Lumbar/Sacral spine:  Forward Flexion: 90 degrees  Ext: 30 degrees  Rotation/ext to right: painful   Rotation/ext to left:: painful   Lordosis. No  Tenderness in the lumbar spine at midline. Yes  Tenderness in the lumbar paraspinal muscles.Yes      Psychiatric: mentation appears normal., affect and mood normal      DIRE Score for ongoing opioid management is calculated as follows:  Diagnosis = 2 pts (slowly progressive; moderate pain/objective findings)  Intractability = 3 pts (patient  fully engaged but inadequate response to treatments)  Risk   Psych = 3 pts (no significant personality dysfunction/mental illness; good communication with clinic)   Chem Hlth = 3 pts (no history of chemical dependency; not drug-focused)  Reliability = 3 pts (highly reliable with meds, appointments, treatments)  Social = 3 pts (supportive family/close relationships; involved in work/school; no isolation)  (Psych + Chem hlth + Reliability + Social) = 17   Efficacy = 2 pts (moderate benefit/function; low med dose; too early/not tried meds)  DIRE Score = 19   7-13: likely NOT suitable candidate for long-term opioid analgesia  14-21: may be a suitable candidate for long-term opioid analgesia      DIAGNOSTIC TESTS:  Imaging Studies:   MRI OF THE CERVICAL SPINE WITHOUT CONTRAST 5/16/2017 8:02 PM     COMPARISON: None.     HISTORY: Cervical radiculopathy. Radiculopathy, cervical region.     TECHNIQUE: Multiplanar, multisequence MRI images of the cervical spine  were acquired without intravenous contrast.     FINDINGS: There is normal alignment of the cervical vertebrae;  however, there is straightening of normal cervical lordosis. Vertebral  body heights of the cervical spine are normal. Marrow signal  throughout the cervical vertebrae is within normal limits. There is no  evidence for fracture or pathologic bony lesion of the cervical spine.        There is loss of disc height, disc desiccation and posterior disc  bulging to varying degrees at all levels of the cervical spine.       The cervical spinal cord is mildly deformed by degenerative changes at  the C4-C5 level. The cervical spinal cord is otherwise normal in  contour. There is no abnormal signal in the cervical spinal cord.  There is no evidence for intrathecal abnormality.     Level by level:     C2-C3: There is facet arthropathy bilaterally, uncinate hypertrophy  bilaterally and a posterior broad-based disc-osteophyte complex. There  is no spinal canal or neural  foraminal stenosis at this level.     C3-C4: There is facet arthropathy bilaterally, uncinate hypertrophy  bilaterally and a posterior broad-based disc-osteophyte complex with a  superimposed tiny posterior central disc herniation (protrusion).  There is no spinal canal or neural foraminal stenosis at this level.     C4-C5: There is facet arthropathy bilaterally, uncinate hypertrophy  bilaterally and a posterior broad-based disc-osteophyte complex with a  superimposed small posterior broad-based disc herniation (protrusion).  These findings result in moderate spinal canal stenosis with mild  flattening of the anterior surface of the cervical spinal cord,  mild-moderate left foraminal narrowing and mild right foraminal  narrowing.     C5-C6: There is facet arthropathy bilaterally, uncinate hypertrophy  bilaterally and a posterior broad-based disc-osteophyte complex. These  findings result in mild bilateral neural foraminal narrowing and mild  spinal canal narrowing.     C6-C7: There is facet arthropathy bilaterally, uncinate hypertrophy  bilaterally and a posterior broad-based disc-osteophyte complex. These  findings result in moderate-severe left foraminal stenosis, but no  spinal canal or right foraminal narrowing.     C7-T1: There is facet arthropathy bilaterally, uncinate hypertrophy  bilaterally and a posterior broad-based disc-osteophyte complex. There  is no spinal canal or neural foraminal stenosis at this level.         IMPRESSION: Degenerative changes of the cervical spine as described  above.     ASSESSMENT:   1. Lumbar back pain with right L2 nerve impingement noted on 7/2015 MRI  2. Generalized myofacial pain  3. B carpal tunnel syndrome  4. B tarsal tunnel syndrome      Junie notes struggling with any tasks which require  strength or extended use of her hands such as holding a clip board or phone. She notes loss of  strength and intermittent numbness. This is not new but seems to be worsening.  She has occasionally been taking an old prescription of Tramadol that she had when pain is more intense.     Reviewed MRI results. I would like her to complete Neurology eval before we proceed with any interventional procedures.      Plan:    Diagnosis reviewed, treatment option addressed, and risk/benifits discussed.  Self-care instructions given.  I am recommending a multidisciplinary treatment plan to help this patient better manage pain.      1. Occupational therapy for arm/hand pain  2. Schedule follow-up with TONI Mccullough, NPJerryC about one week after neurosurgery visit. Have their notes sent to Gabriela.   3. Medication recommendations: No changes.      Total time spent face to face was 30 minutes and more than 50% of face to face time was spent in counseling and/or coordination of care regarding the diagnosis and recommendations above.      TONI Fournier, NP-C   [facility]

## 2017-05-24 NOTE — PATIENT INSTRUCTIONS
After Visit Instructions:     Thank you for coming to [facility] for your care. It is my goal to partner with you to help you reach your optimal state of health.     I am recommending multidisciplinary care at this time.  The focus of care will be to continue gradual rehabilitation and pain management with medication adjustments as needed.    Continue daily self-care, identifying contributing factors, and monitoring variations in pain level. Continue to integrate self-care into your life.      1. Occupational therapy for arm/hand pain  2. Schedule follow-up with TONI Mccullough NP-C about one week after neurosurgery visit. Have their notes sent to Gabriela.   3. Medication recommendations: No changes.     TONI Fournier, NP-C  [facility]    Contact information: [facility]    Please call if any side effects, questions, or concerns arise.    Nurse Triage line:  [phone]   Call this number with any questions or concerns. You may leave a detailed message anytime. Calls are typically returned Monday through Friday between 8 AM and 4:30 PM. We usually get back to you within 2 business days depending on the issue/request.    Scheduling number: [phone].  Call this number to schedule or change appointments.          Medication Refills Policy:    For non-narcotic medications, please your pharmacy directly to request a refill and the pharmacy will call the [facility] for authorization. Please allow 3-4 days for these refills.    For narcotic refills, call the nurse triage line and leave a message requesting your refill along with the name of the pharmacy that you use. Narcotic prescriptions will be mailed to your pharmacy or you may pick them up at the clinic.  Please call 7-10 days before your refill is due  The above policy allows adequate time so that you do not run out of medication.    No Show - Late Cancellation  - Late Arrival Policy  We believe regular attendance is key to your success in our program.    Any time you are unable to keep your appointment we ask that you call us at  least 24 hours in advance to let us know. This will allow us to offer the appointment time to another patient. The following is our policy for missed appointments. This also applies to appointments cancelled with less than 24 hours notice.    You will receive a letter after missing your 1st and 2nd appointments without contacting the clinic before your scheduled appointment time.     After missing 3 appointments without calling first, we will cancel all of your future appointments at [facility].    At that point, you will not be able to resume services unless approved by your care team  We understand that unforseen circumstances arise, however, out of respect for all concerned and to provide this appointment to another patient, this policy will be enforced.    Please note that most follow up appointments are 30 minutes long. If you arrive late, your provider may not be able to see you for the entire 30 minutes. Please also note that if you arrive more than 15 minutes for any appointment, it may be rescheduled.

## 2017-05-24 NOTE — MR AVS SNAPSHOT
After Visit Summary   5/24/2017    Maegan Lira    MRN: [id number]           Patient Information     Date Of Birth          1970        Visit Information        Provider Department      5/24/2017 11:00 AM Gabriela Kincaid APRN CNP [facility]        Today's Diagnoses     Bilateral carpal tunnel syndrome    -  1      Care Instructions    After Visit Instructions:     Thank you for coming to [facility] for your care. It is my goal to partner with you to help you reach your optimal state of health.     I am recommending multidisciplinary care at this time.  The focus of care will be to continue gradual rehabilitation and pain management with medication adjustments as needed.    Continue daily self-care, identifying contributing factors, and monitoring variations in pain level. Continue to integrate self-care into your life.      1. Occupational therapy for arm/hand pain  2. Schedule follow-up with TONI Mccullough, NPAMI about one week after neurosurgery visit. Have their notes sent to Gabriela.   3. Medication recommendations: No changes.     TONI Fournier, NPJerryC  [facility]    Contact information: [facility]    Please call if any side effects, questions, or concerns arise.    Nurse Triage line:  [phone]   Call this number with any questions or concerns. You may leave a detailed message anytime. Calls are typically returned Monday through Friday between 8 AM and 4:30 PM. We usually get back to you within 2 business days depending on the issue/request.    Scheduling number: [phone].  Call this number to schedule or change appointments.          Medication Refills Policy:    For non-narcotic medications, please your pharmacy directly to request a refill and the pharmacy will call the [facility] for authorization. Please allow 3-4 days for these refills.    For  narcotic refills, call the nurse triage line and leave a message requesting your refill along with the name of the pharmacy that you use. Narcotic prescriptions will be mailed to your pharmacy or you may pick them up at the clinic.  Please call 7-10 days before your refill is due  The above policy allows adequate time so that you do not run out of medication.    No Show - Late Cancellation - Late Arrival Policy  We believe regular attendance is key to your success in our program.    Any time you are unable to keep your appointment we ask that you call us at  least 24 hours in advance to let us know. This will allow us to offer the appointment time to another patient. The following is our policy for missed appointments. This also applies to appointments cancelled with less than 24 hours notice.    You will receive a letter after missing your 1st and 2nd appointments without contacting the clinic before your scheduled appointment time.     After missing 3 appointments without calling first, we will cancel all of your future appointments at [facility].    At that point, you will not be able to resume services unless approved by your care team  We understand that unforseen circumstances arise, however, out of respect for all concerned and to provide this appointment to another patient, this policy will be enforced.    Please note that most follow up appointments are 30 minutes long. If you arrive late, your provider may not be able to see you for the entire 30 minutes. Please also note that if you arrive more than 15 minutes for any appointment, it may be rescheduled.                                     Follow-ups after your visit        Additional Services     [facility] PT, HAND, AND CHIROPRACTIC REFERRAL       **This order will print in the [facility] Scheduling Office**    Physical Therapy, Hand Therapy and Chiropractic Care are available through:    *[facility]  *[facility]  *[facility]    Call one number to schedule at any of the above locations: [phone].    Your provider has referred you to: Hand Therapy    Indication/Reason for Referral: Wrist Pain and hand pain  Onset of Illness: several years  Therapy Orders: Evaluate and Treat  Special Programs: None  Special Request: None    Sherry Powell      Additional Comments for the Therapist or Chiropractor: Has seen Kellie Bunn and would like to return.     Please be aware that coverage of these services is subject to the terms and limitations of your health insurance plan.  Call member services at your health plan with any benefit or coverage questions.      Please bring the following to your appointment:    *Your personal calendar for scheduling future appointments  *Comfortable clothing                  Who to contact     If you have questions or need follow up information about today's clinic visit or your schedule please contact [facility] directly at [phone].  Normal or non-critical lab and imaging results will be communicated to you by MyChart, letter or phone within 4 business days after the clinic has received the results. If you do not hear from us within 7 days, please contact the clinic through [employer] or phone. If you have a critical or abnormal lab result, we will notify you by phone as soon as possible.  Submit refill requests through [employer] or call your pharmacy and they will forward the refill request to us. Please allow 3 business days for your refill to be completed.          Additional Information About Your Visit        MyChart Information     [employer] gives you secure access to your electronic health record. If you see a primary care provider, you can also send messages to your care team and make appointments. If you have questions, please call your primary care clinic.  If you do not have a primary care provider, please call [phone] and they will  assist you.        Care EveryWhere ID     This is your Care EveryWhere ID. This could be used by other organizations to access your [facility] medical records  [id number]        Your Vitals Were     Pulse Respirations Last Period             87 16 04/15/2017 (Exact Date)          Blood Pressure from Last 3 Encounters:   05/24/17 110/72   05/12/17 122/90   04/26/17 124/89    Weight from Last 3 Encounters:   05/12/17 89.8 kg (198 lb)   04/26/17 89 kg (196 lb 4.8 oz)   12/15/16 92.1 kg (203 lb)              We Performed the Following     [facility] PT, HAND, AND CHIR[facility]RACTIC REFERRAL        Primary Care Provider Office Phone # Fax #    Jelly Mcarthur,  [phone]       [facility] [place] [address]  [place] [address]        Thank you!     Thank you for choosing [facility]  for your care. Our goal is always to provide you with excellent care. Hearing back from our patients is one way we can continue to improve our services. Please take a few minutes to complete the written survey that you may receive in the mail after your visit with us. Thank you!             Your Updated Medication List - Protect others around you: Learn how to safely use, store and throw away your medicines at www.disposemymeds.org.          This list is accurate as of: 5/24/17 12:06 PM.  Always use your most recent med list.                   Brand Name Dispense Instructions for use    Butalbital-APAP-Caffeine -40 MG Caps     30 capsule    Take one capsule as needed for migraine. Max one capsule daily. Do not take with opiate pain medications including oxycodone.       diazepam 5 MG tablet    VALIUM    30 tablet    Take 1 tablet (5 mg) by mouth every 6 hours as needed for anxiety, sleep or muscle spasms If you take this medication, do not take any other muscle relaxants.       DULoxetine 30 MG EC capsule    CYMBALTA    90 capsule    TAKE 1 CAPSULE BY MOUTH EVERY DAY       hydrOXYzine 25 MG  tablet    ATARAX    60 tablet    Take 1-2 tablets (25-50 mg) by mouth every 6 hours as needed for itching       metaxalone 800 MG tablet    SKELAXIN    270 tablet    Take 0.5-1 tablets (400-800 mg) by mouth 3 times daily as needed for moderate pain       naproxen 375 MG tablet    NAPROSYN    60 tablet    Take 1 tablet (375 mg) by mouth 2 times daily as needed for moderate pain       omeprazole 20 MG tablet     30 tablet    Take 1 tablet (20 mg) by mouth daily as needed       ondansetron 4 MG ODT tab    ZOFRAN-ODT    30 tablet    Take 1 tablet (4 mg) by mouth daily as needed for nausea       senna-docusate 8.6-50 MG per tablet    KATIANA-COLACE    90 tablet    Take 1-2 tablets by mouth 2 times daily as needed for constipation       UNABLE TO FIND      Take 10 drops by mouth 2 times daily MEDICATION NAME: CBD Max Hemp Oil

## 2017-05-25 ENCOUNTER — MYC MEDICAL ADVICE (OUTPATIENT)
Dept: FAMILY MEDICINE | Facility: CLINIC | Age: 47
End: 2017-05-25

## 2017-06-01 ENCOUNTER — THERAPY VISIT (OUTPATIENT)
Dept: OCCUPATIONAL THERAPY | Facility: CLINIC | Age: 47
End: 2017-06-01
Payer: COMMERCIAL

## 2017-06-01 DIAGNOSIS — G56.03 CARPAL TUNNEL SYNDROME, BILATERAL: Primary | ICD-10-CM

## 2017-06-01 PROCEDURE — 97110 THERAPEUTIC EXERCISES: CPT | Mod: GO | Performed by: OCCUPATIONAL THERAPIST

## 2017-06-01 PROCEDURE — 97140 MANUAL THERAPY 1/> REGIONS: CPT | Mod: GO | Performed by: OCCUPATIONAL THERAPIST

## 2017-06-01 PROCEDURE — 97112 NEUROMUSCULAR REEDUCATION: CPT | Mod: GO | Performed by: OCCUPATIONAL THERAPIST

## 2017-06-01 PROCEDURE — 97165 OT EVAL LOW COMPLEX 30 MIN: CPT | Mod: GO | Performed by: OCCUPATIONAL THERAPIST

## 2017-06-01 NOTE — MR AVS SNAPSHOT
After Visit Summary   6/1/2017    Maegan Lira    MRN: [id number]           Patient Information     Date Of Birth          1970        Visit Information        Provider Department      6/1/2017 10:00 AM Felicia Summers [facility]        Today's Diagnoses     Carpal tunnel syndrome, bilateral    -  1       Follow-ups after your visit        Your next 10 appointments already scheduled     Jun 08, 2017  9:30 AM CDT   CINDY Hand with Felicia [facility] ([facility])    [address] [id number] [phone]            Marcello 15, 2017 10:30 AM CDT   CINDY Hand with Felicia Summers   [facility] ([facility])    [address] [id number] [phone]              Who to contact     If you have questions or need follow up information about today's clinic visit or your schedule please contact [facility] directly at [phone].  Normal or non-critical lab and imaging results will be communicated to you by [employer], letter or phone within 4 business days after the clinic has received the results. If you do not hear from us within 7 days, please contact the clinic through ZoopShopt or phone. If you have a critical or abnormal lab result, we will notify you by phone as soon as possible.  Submit refill requests through [employer]Park or call your pharmacy and they will forward the refill request to us. Please allow 3 business days for your refill to be completed.          Additional Information About Your Visit        [employer] Information     [employer]Park gives you secure access to your electronic health record. If you see a primary care provider, you can also send messages to your care team and make appointments. If you have questions, please call your primary care clinic.  If you do not have a primary care provider, please call [phone] and they will assist you.        Care EveryWhere ID     This is  your Care EveryWhere ID. This could be used by other organizations to access your [place] medical records  [id number]         Blood Pressure from Last 3 Encounters:   05/24/17 110/72   05/12/17 122/90   04/26/17 124/89    Weight from Last 3 Encounters:   05/12/17 89.8 kg (198 lb)   04/26/17 89 kg (196 lb 4.8 oz)   12/15/16 92.1 kg (203 lb)              We Performed the Following     HC OT EVAL, LOW COMPLEXITY     CINDY INITIAL EVAL REPORT     MANUAL THER TECH,1+REGIONS,EA 15 MIN     NEUROMUSCULAR RE-EDUCATION     THERAPEUTIC EXERCISES        Primary Care Provider Office Phone # Fax #    Jelly Mcarthur,  [phone]       [facility] [address]  [facility] [address]        Thank you!     Thank you for choosing [facility]  for your care. Our goal is always to provide you with excellent care. Hearing back from our patients is one way we can continue to improve our services. Please take a few minutes to complete the written survey that you may receive in the mail after your visit with us. Thank you!             Your Updated Medication List - Protect others around you: Learn how to safely use, store and throw away your medicines at www.disposemymeds.org.          This list is accurate as of: 6/1/17 11:57 AM.  Always use your most recent med list.                   Brand Name Dispense Instructions for use    Butalbital-APAP-Caffeine -40 MG Caps     30 capsule    Take one capsule as needed for migraine. Max one capsule daily. Do not take with opiate pain medications including oxycodone.       diazepam 5 MG tablet    VALIUM    30 tablet    Take 1 tablet (5 mg) by mouth every 6 hours as needed for anxiety, sleep or muscle spasms If you take this medication, do not take any other muscle relaxants.       DULoxetine 30 MG EC capsule    CYMBALTA    90 capsule    TAKE 1 CAPSULE BY MOUTH EVERY DAY       hydrOXYzine 25 MG tablet    ATARAX    60 tablet    Take 1-2 tablets (25-50  mg) by mouth every 6 hours as needed for itching       metaxalone 800 MG tablet    SKELAXIN    270 tablet    Take 0.5-1 tablets (400-800 mg) by mouth 3 times daily as needed for moderate pain       naproxen 375 MG tablet    NAPROSYN    60 tablet    Take 1 tablet (375 mg) by mouth 2 times daily as needed for moderate pain       omeprazole 20 MG tablet     30 tablet    Take 1 tablet (20 mg) by mouth daily as needed       ondansetron 4 MG ODT tab    ZOFRAN-ODT    30 tablet    Take 1 tablet (4 mg) by mouth daily as needed for nausea       senna-docusate 8.6-50 MG per tablet    KATIANA-COLACE    90 tablet    Take 1-2 tablets by mouth 2 times daily as needed for constipation       UNABLE TO FIND      Take 10 drops by mouth 2 times daily MEDICATION NAME: CBD Max Hemp Oil

## 2017-06-01 NOTE — PROGRESS NOTES
Hand Therapy Initial Evaluation    Current Date:  6/1/2017    Diagnosis:  Bilateral hand pain/weakness  DOI:  October2015     Subjective:  Maegan Lira is a 46 year old right hand dominant female.    Patient reports symptoms of pain, stiffness/loss of motion, weakness/loss of strength and tingling  of the bilateral hands which occurred due to unknonwn. Since onset symptoms had improved with therapy and then worsened.  Special tests:  MRI (pending).  Previous treatment: wrist orthosis and Hand Therapy.    General health as reported by patient is fair.  Pertinent medical history includes:  Sojener's; overweight, fibromyalgia, migraines, numbness, pain night, weakness.  Medical allergies: latex and adhesive.  Surgical history: none.  Medication history: pain, muscle relaxants, anti-depressants    Current occupation is   Currently working part time (30 hours/week)  Job Tasks:  Prolonged sitting, writing, computer  Barriers include:  none  Prior functional level:  independent     Red flags:  none      Additional Occupational Profile Information (patterns of daily living, interests, values and needs): swimming, walking, reading  Upper Extremity Functional Index Score:  SCORE:   Column Totals: /80: 28   (A lower score indicates greater disability.)      Objective:  Pain Level Report  VAS(0-10) 6/1/2017          At Rest: 7 tingling  R: 6  L: 6          With Use: R: 8  L: 8              Report of Pain:  Location:  All fingers  Description:  pain  Frequency:  constant    Duration:  lingers  Exacerbated by:  use  Relieved by:  meds (somewhat)  Progression: worsening    Special Tests:   Date 6/1/2017        Left Right           Tinels CT             Tinels PIN             Tinels DSRN             Tinels cubital tunnel             Tinels [place]             Phalen's   + +           Elbow Flexion Test   + +               ULTT ( % of full glide )  Date 6/1/2017          Median nerve                  Ulnar nerve    Radial nerve  R:  + at 90 degrees arm abd and wrist exteded  L:  + at 60 with wrist extended             Cervical Screen: (+ or -)  Date 2017 12/15/2016      Active Comments Passive Comments     Flexion - -      Extension -       Lateral Flexion  Right -      Lateral Flexion  Left -      Rotation Right heaviness      Rotation Left heaviness      Compression Neck -        TOS Special Tests  Date 2017         Left Right Left Right Left Right Left Right Left Right Left Right   Inderjit Test + +             Costoclavicular Test + +             Rae's Test               Adson's Test                   Posture:  []              Normal   []             Forward Neck Posture  [x]             Rounded Forward Shoulders  []             Shoulder Height Difference  Comments:    Patient's proximal musculature at shoulder appears imbalanced with tight pectoralis muscles, subscapularis, upper trapezius, lattisimus and weak scapular stabilizers.  This pattern contributes to overuse of distal musculature.    Edema:  [x]             None  []              Mild   []             Moderate  []              Severe     []              of affected part      Sensation: []              WNL throughout all nerve distributions; per patient report    [x]              Decreased  [x]              Median    [x]             Ulnar    []             Radial nerve distribution    []              See Sensory Evaluation    Description:    STRENGTH:   (Measured in pounds)     2017        Trials Right Left Right Left Right Left  Right Left   1  2  3  Av#-       65#-           3 pt Pinch 2017      Trials Right Left Right Left Right Left  Right Left   1  2  3  Av#-       17#-           Lateral Pinch 2017        Trials Right Left Right Left Right Left  Right Left   1  2  3  Av#-       21#-                 Assessment:  Patient presents with symptoms consistent with diagnosis of  Bilateral CT and other nerve impingement,  with conservative intervention.     Patient's limitations or Problem List includes:  Pain, Decreased ROM/motion, Weakness, Sensory disturbance, Decreased , Decreased pinch, Decreased dexterity, Tightness in musculature and Adherence in connective tissue of the bilateral upper extremity which interferes with the patient's ability to perform Self Care Tasks (dressing, eating, bathing, hygiene/toileting), Work Tasks, Sleep Patterns, Recreational Activities, Household Chores and Driving  as compared to previous level of function.    Rehab Potential:  Good - Return to full activity, some limitations    Patient will benefit from skilled Occupational Therapy to increase flexibility, overall strength and sensation and decrease pain to return to previous activity level and resume normal daily tasks and to reach their rehab potential.    Barriers to Learning:  No barrier    Communication Issues:  Patient appears to be able to clearly communicate and understand verbal and written communication and follow directions correctly.    Assessment of Occupational Performance:  5 or more Performance Deficits  Identified Performance Deficits: bathing/showering, toileting, dressing, feeding, hygiene and grooming, health management and maintenance, home establishment and management, meal preparation and cleanup, shopping, sleep and work      Clinical Decision Making (Complexity): Low complexity    Treatment Explanation:  The following has been discussed with the patient:  RX ordered/plan of care  Anticipated outcomes  Possible risks and side effects    Plan:  Frequency:  1 X week, once daily  Duration:  for 12-15 visits    Treatment Plan:    Modalities:  Laser Light  Therapeutic Exercise:  AROM and Tendon Gliding  Neuromuscular re-education:  Nerve Gliding, Posture, Kinesiotaping and Strain Counter Strain  Manual Techniques:  Friction massage and Myofascial release  Discharge Plan:  Achieve  all LTG.  Independent in home treatment program.  Reach maximal therapeutic benefit.    Home Exercise Program:  Foam roller massage and stretch pecs  Icing prn  Proximal median nerve glide  Massage fa and upper arm (bicep) (partner to use roller bar to massage)  Clock pec stretch  Latissimus stretch  FM LEP (bilateral)  Extensor stretches  Table top flexor stretch  tricep stretch  Table top flexor stretch  [place] massage  Epsom salts  Can massage tricep  Core strengthening and Lower trap squeezes with nerve gliding  Trigger massage to outer elbow with flexion / extension of elbow  Neck lateral bend with median nerve glide  Use long dowel to push into tight pecs  Back extension exercises over therapy ball  Bullet prosource foam roller for deeper massage    Next Visit:  Laser  Mfr  Stretching  Nerve gliding

## 2017-06-05 ENCOUNTER — MYC MEDICAL ADVICE (OUTPATIENT)
Dept: PALLIATIVE MEDICINE | Facility: CLINIC | Age: 47
End: 2017-06-05

## 2017-06-05 DIAGNOSIS — G89.4 CHRONIC PAIN SYNDROME: ICD-10-CM

## 2017-06-05 NOTE — TELEPHONE ENCOUNTER
Derrek from patient Created: 6/5/2017 9:38 AM    Ajay Ochoa,  I apologize for the delay in getting back to you regarding Dr Silvestre's letter regarding my emotional support dogs, but I cannot figure out a way to fax it to you. I can either mail it to you or try to copy and paste it in a my chart message. If I copy/paste, it will not be in the same format as the original letter but you would have the content. The date of his letter was June 22, 2016 - if that helps you to locate it in my file? Just let me know what works best for you.  Thanks,  Junie

## 2017-06-05 NOTE — TELEPHONE ENCOUNTER
Junie,     If you want to copy/paste and send through My Chart that should be fine. When do you need the new letter?     TONI Fournier, NP-C  [facility]

## 2017-06-06 RX ORDER — DULOXETIN HYDROCHLORIDE 30 MG/1
CAPSULE, DELAYED RELEASE ORAL
Qty: 90 CAPSULE | Refills: 0 | Status: SHIPPED | OUTPATIENT
Start: 2017-06-06 | End: 2017-08-15

## 2017-06-06 NOTE — TELEPHONE ENCOUNTER
Called [facility] and they do not have record of the refill from 3/14/17 of 90 with a refill.    Will go ahead and approve 90 tabs per previous order.    Last AST/ALT was 10/24/16.    Geraldine Vaughan RN

## 2017-06-06 NOTE — TELEPHONE ENCOUNTER
Pending Prescriptions:                       Disp   Refills    DULoxetine (CYMBALTA) 30 MG EC capsule [P*90 cap*0            Sig: TAKE 1 CAPSULE BY MOUTH EVERY DAY        Last Written Prescription Date: 3/14/17  Last Fill Quantity: 90, # refills: 1  Last Office Visit with [facility], [facility] or [facility] prescribing provider: 4/26/17 Lyubov        BP Readings from Last 3 Encounters:   05/24/17 110/72   05/12/17 122/90   04/26/17 124/89     Pulse: (for Fetzima)  Creatinine   Date Value Ref Range Status   10/24/2016 0.87 0.52 - 1.04 mg/dL Final   ]    Last PHQ-9 score on record=   PHQ-9 SCORE 10/24/2016   Total Score 11     Reny Aguilar, RT(R)

## 2017-06-06 NOTE — TELEPHONE ENCOUNTER
"Derrek Created: 6/5/2017 9:28 PM    Gabriela,  I don't think I will need it for a couple weeks. If something comes up sooner and a management company wants to see a letter I can always show them Dr Silvestre's and let them know that I am getting a more recently dated letter soon. I have copied his letter below. A couple main components was that it has to be on [employer] letterhead and has to mention both dogs - Juan and Juancarlos. His signature did not transfer correctly when I converted the pdf file to a word document so I could copy/paste. Also, this letter is for travel and not quite the same as for housing, but at least you can see how he worded it.  [employer] has a lot of examples of letters if you look up emotional support dogs. I can bring in the actual letter my next visit. I will need an updated flying letter, but not until December. Thanks for your help. Junie    June 22, 2016  To Whom It May Concern:  Maegan Lira has been under my care for her anxiety disorder and associated chronic pain conditions since March 2016. conditions since March 2016. She has been diagnosed with Generalized Anxiety Disorder (300.02) as recognized by the DSM-5. This condition impacts her daily activity, interferes with her ability to manage demanding and challenging situations, and limits her ability to travel freely.    It is my professional opinion that the presence of an emotional support animal is necessary to treat and alleviate Ms. Lira's anxiety disorder . She often experiences increased worry, apprehension, and distress in crowded travel situations. She has indicated to me that traveling with her dog(s) allows her to feel more comfortable and calm during these demanding and difficult times.    Please allow Ms. Lira to be accompanied by her emotional support animals (\"Juan\" and \"Juancarlos\") in the cabin of the aircraft in accordance with the [employer] Act.    Sincerely,  "

## 2017-06-07 NOTE — TELEPHONE ENCOUNTER
I will write letter as soon as I can. Please leave encounter open.   TONI Fournier, NP-C  [facility]

## 2017-06-08 ENCOUNTER — THERAPY VISIT (OUTPATIENT)
Dept: OCCUPATIONAL THERAPY | Facility: CLINIC | Age: 47
End: 2017-06-08
Payer: COMMERCIAL

## 2017-06-08 DIAGNOSIS — G56.03 CARPAL TUNNEL SYNDROME, BILATERAL: ICD-10-CM

## 2017-06-08 PROCEDURE — 97026 INFRARED THERAPY: CPT | Mod: GO | Performed by: OCCUPATIONAL THERAPIST

## 2017-06-08 PROCEDURE — 97112 NEUROMUSCULAR REEDUCATION: CPT | Mod: GO | Performed by: OCCUPATIONAL THERAPIST

## 2017-06-08 PROCEDURE — 97140 MANUAL THERAPY 1/> REGIONS: CPT | Mod: GO | Performed by: OCCUPATIONAL THERAPIST

## 2017-06-08 PROCEDURE — 97110 THERAPEUTIC EXERCISES: CPT | Mod: GO | Performed by: OCCUPATIONAL THERAPIST

## 2017-06-08 NOTE — PROGRESS NOTES
SOAP note objective information for 6/8/2017.    Please refer to the daily flowsheet for treatment today, total treatment time and time spent performing 1:1 timed codes.       Pain Level Report  VAS(0-10) 6/1/2017 6/8/2017         At Rest: 7 tingling  R: 6  L: 6 7 tingling  R: 6  L: 6         With Use: R: 8  L: 8 R: 8  L: 8             Report of Pain:  Location:  All fingers  Description:  pain  Frequency:  constant    Duration:  lingers  Exacerbated by:  use  Relieved by:  meds (somewhat)  Progression: improving    Special Tests:   Date 6/1/2017        Left Right           Tinels CT             Tinels PIN             Tinels DSRN             Tinels cubital tunnel             Tinels [place] Canal             Phalen's   + +           Elbow Flexion Test   + +               ULTT ( % of full glide )  Date 6/1/2017 6/8/17        Median nerve                 Ulnar nerve    Radial nerve  R:  + at 90 degrees arm abd and wrist exteded  L:  + at 60 with wrist extended  R:  + at 90 degrees arm abd and wrist exteded  L:  + at 60 with wrist extended          Cervical Screen: (+ or -)  Date 6/1/2017 12/15/2016      Active Comments Passive Comments     Flexion - -      Extension -       Lateral Flexion  Right -      Lateral Flexion  Left -      Rotation Right heaviness      Rotation Left heaviness      Compression Neck -        TOS Special Tests  Date 6/1/2017         Left Right Left Right Left Right Left Right Left Right Left Right   Inderjit Test + +             Costoclavicular Test + +             Rae's Test               Adson's Test                   Posture:  []              Normal   []             Forward Neck Posture  [x]             Rounded Forward Shoulders  []             Shoulder Height Difference  Comments:    Patient's proximal musculature at shoulder appears imbalanced with tight pectoralis muscles, subscapularis, upper trapezius, lattisimus and weak scapular stabilizers.  This pattern contributes to overuse of distal  musculature.    Edema:  [x]             None  []              Mild   []             Moderate  []              Severe     []              of affected part      Sensation: []              WNL throughout all nerve distributions; per patient report    [x]              Decreased  [x]              Median    [x]             Ulnar    []             Radial nerve distribution    []              See Sensory Evaluation    Description:    STRENGTH:   (Measured in pounds)     2017        Trials Right Left Right Left Right Left  Right Left   1  2  3  Av#-       65#-           3 pt Pinch 2017      Trials Right Left Right Left Right Left  Right Left   1  2  3  Av#-       17#-           Lateral Pinch 2017        Trials Right Left Right Left Right Left  Right Left   1  2  3  Av#-       21#-               Home Exercise Program:  Foam roller massage and stretch pecs  Icing prn  Proximal median nerve glide  Massage fa and upper arm (bicep) (partner to use roller bar to massage)  Clock pec stretch  Latissimus stretch  FM LEP (bilateral)  Extensor stretches  Table top flexor stretch  tricep stretch  Table top flexor stretch  [place] massage  Epsom salts  Can massage tricep  Core strengthening and Lower trap squeezes with nerve gliding  Trigger massage to outer elbow with flexion / extension of elbow  Neck lateral bend with median nerve glide  Use long dowel to push into tight pecs  Back extension exercises over therapy ball  Bullet prosource foam roller for deeper massage    Next Visit:  Laser  Mfr  Stretching  Nerve gliding

## 2017-06-08 NOTE — MR AVS SNAPSHOT
After Visit Summary   6/8/2017    Maegan Lira    MRN: [id number]           Patient Information     Date Of Birth          1970        Visit Information        Provider Department      6/8/2017 9:30 AM Felicia Summers [facility]        Today's Diagnoses     Carpal tunnel syndrome, bilateral           Follow-ups after your visit        Your next 10 appointments already scheduled     Marcello 15, 2017 10:30 AM CDT   CINDY Hand with Felicia Melina   [facility]    [address] [place] [id number] [phone]              Who to contact     If you have questions or need follow up information about today's clinic visit or your schedule please contact [facility] directly at [phone].  Normal or non-critical lab and imaging results will be communicated to you by MyChart, letter or phone within 4 business days after the clinic has received the results. If you do not hear from us within 7 days, please contact the clinic through MyChart or phone. If you have a critical or abnormal lab result, we will notify you by phone as soon as possible.  Submit refill requests through [email or web address] or call your pharmacy and they will forward the refill request to us. Please allow 3 business days for your refill to be completed.          Additional Information About Your Visit        MyChart Information     [email or web address] gives you secure access to your electronic health record. If you see a primary care provider, you can also send messages to your care team and make appointments. If you have questions, please call your primary care clinic.  If you do not have a primary care provider, please call [phone] and they will assist you.        Care EveryWhere ID     This is your Care EveryWhere ID. This could be used by other organizations to access your [place] medical records  [id number]         Blood Pressure from Last 3 Encounters:   05/24/17 110/72    05/12/17 122/90   04/26/17 124/89    Weight from Last 3 Encounters:   05/12/17 89.8 kg (198 lb)   04/26/17 89 kg (196 lb 4.8 oz)   12/15/16 92.1 kg (203 lb)              We Performed the Following     INFRARED THERAPY     MANUAL THER TECH,1+REGIONS,EA 15 MIN     NEUROMUSCULAR RE-EDUCATION     THERAPEUTIC EXERCISES        Primary Care Provider Office Phone # Fax #    Jelly Mcarthur [phone]       [facility] [address] [facility] [address]  [facility] [address]        Thank you!     Thank you for choosing [facility]  for your care. Our goal is always to provide you with excellent care. Hearing back from our patients is one way we can continue to improve our services. Please take a few minutes to complete the written survey that you may receive in the mail after your visit with us. Thank you!             Your Updated Medication List - Protect others around you: Learn how to safely use, store and throw away your medicines at www.disposemymeds.org.          This list is accurate as of: 6/8/17 11:59 AM.  Always use your most recent med list.                   Brand Name Dispense Instructions for use    Butalbital-APAP-Caffeine -40 MG Caps     30 capsule    Take one capsule as needed for migraine. Max one capsule daily. Do not take with opiate pain medications including oxycodone.       diazepam 5 MG tablet    VALIUM    30 tablet    Take 1 tablet (5 mg) by mouth every 6 hours as needed for anxiety, sleep or muscle spasms If you take this medication, do not take any other muscle relaxants.       * DULoxetine 30 MG EC capsule    CYMBALTA    90 capsule    TAKE 1 CAPSULE BY MOUTH EVERY DAY       * DULoxetine 30 MG EC capsule    CYMBALTA    90 capsule    TAKE 1 CAPSULE BY MOUTH EVERY DAY       hydrOXYzine 25 MG tablet    ATARAX    60 tablet    Take 1-2 tablets (25-50 mg) by mouth every 6 hours as needed for itching       metaxalone 800 MG tablet    SKELAXIN    270 tablet    Take 0.5-1  tablets (400-800 mg) by mouth 3 times daily as needed for moderate pain       naproxen 375 MG tablet    NAPROSYN    60 tablet    Take 1 tablet (375 mg) by mouth 2 times daily as needed for moderate pain       omeprazole 20 MG tablet     30 tablet    Take 1 tablet (20 mg) by mouth daily as needed       ondansetron 4 MG ODT tab    ZOFRAN-ODT    30 tablet    Take 1 tablet (4 mg) by mouth daily as needed for nausea       senna-docusate 8.6-50 MG per tablet    KATIANA-COLACE    90 tablet    Take 1-2 tablets by mouth 2 times daily as needed for constipation       UNABLE TO FIND      Take 10 drops by mouth 2 times daily MEDICATION NAME: CBD Max Hemp Oil       * Notice:  This list has 2 medication(s) that are the same as other medications prescribed for you. Read the directions carefully, and ask your doctor or other care provider to review them with you.

## 2017-06-15 ENCOUNTER — THERAPY VISIT (OUTPATIENT)
Dept: OCCUPATIONAL THERAPY | Facility: CLINIC | Age: 47
End: 2017-06-15
Payer: COMMERCIAL

## 2017-06-15 DIAGNOSIS — G56.03 CARPAL TUNNEL SYNDROME, BILATERAL: ICD-10-CM

## 2017-06-15 PROCEDURE — 97110 THERAPEUTIC EXERCISES: CPT | Mod: GO | Performed by: OCCUPATIONAL THERAPIST

## 2017-06-15 PROCEDURE — 97140 MANUAL THERAPY 1/> REGIONS: CPT | Mod: GO | Performed by: OCCUPATIONAL THERAPIST

## 2017-06-15 PROCEDURE — 97112 NEUROMUSCULAR REEDUCATION: CPT | Mod: GO | Performed by: OCCUPATIONAL THERAPIST

## 2017-06-15 PROCEDURE — 97026 INFRARED THERAPY: CPT | Mod: GO | Performed by: OCCUPATIONAL THERAPIST

## 2017-06-15 NOTE — PROGRESS NOTES
SOAP note objective information for 6/15/2017.    Please refer to the daily flowsheet for treatment today, total treatment time and time spent performing 1:1 timed codes.       Pain Level Report  VAS(0-10) 6/1/2017 6/8/2017 6/15/2017        At Rest: 7 tingling  R: 6  L: 6 7 tingling  R: 6  L: 6 7 tingling  R: 6  L: 6        With Use: R: 8  L: 8 R: 8  L: 8 R: 8  L: 8            Report of Pain:  Location:  All fingers  Description:  pain  Frequency:  constant    Duration:  lingers  Exacerbated by:  use  Relieved by:  meds (somewhat)  Progression: improving    Special Tests:   Date 6/1/2017        Left Right           Tinels CT             Tinels PIN             Tinels DSRN             Tinels cubital tunnel             Tinels Guyons Canal             Phalen's   + +           Elbow Flexion Test   + +               ULTT ( % of full glide )  Date 6/1/2017 6/8/17        Median nerve                 Ulnar nerve    Radial nerve  R:  + at 90 degrees arm abd and wrist exteded  L:  + at 60 with wrist extended  R:  + at 90 degrees arm abd and wrist exteded  L:  + at 60 with wrist extended          Cervical Screen: (+ or -)  Date 6/1/2017 12/15/2016      Active Comments Passive Comments     Flexion - -      Extension -       Lateral Flexion  Right -      Lateral Flexion  Left -      Rotation Right heaviness      Rotation Left heaviness      Compression Neck -        TOS Special Tests  Date 6/1/2017         Left Right Left Right Left Right Left Right Left Right Left Right   Inderjit Test + +             Costoclavicular Test + +             Rae's Test               Adson's Test                   Posture:  []              Normal   []             Forward Neck Posture  [x]             Rounded Forward Shoulders  []             Shoulder Height Difference  Comments:    Patient's proximal musculature at shoulder appears imbalanced with tight pectoralis muscles, subscapularis, upper trapezius, lattisimus and weak scapular stabilizers.  This  pattern contributes to overuse of distal musculature.    Edema:  [x]             None  []              Mild   []             Moderate  []              Severe     []              of affected part      Sensation: []              WNL throughout all nerve distributions; per patient report    [x]              Decreased  [x]              Median    [x]             Ulnar    []             Radial nerve distribution    []              See Sensory Evaluation    Description:    STRENGTH:   (Measured in pounds)     2017        Trials Right Left Right Left Right Left  Right Left   1  2  3  Av#-       65#-           3 pt Pinch 2017      Trials Right Left Right Left Right Left  Right Left   1  2  3  Av#-       17#-           Lateral Pinch 2017        Trials Right Left Right Left Right Left  Right Left   1  2  3  Av#-       21#-               Home Exercise Program:  Foam roller massage and stretch pecs  Icing prn  Proximal median nerve glide  Massage fa and upper arm (bicep) (partner to use roller bar to massage)  Clock pec stretch  Latissimus stretch  FM LEP (bilateral)  Extensor stretches  Table top flexor stretch  tricep stretch  Table top flexor stretch  [place] massage  Epsom salts  Can massage tricep  Core strengthening and Lower trap squeezes with nerve gliding  Trigger massage to outer elbow with flexion / extension of elbow  Neck lateral bend with median nerve glide  Use long dowel to push into tight pecs  Back extension exercises over therapy ball  Bullet prosource foam roller for deeper massage    Next Visit:  Laser  Mfr  Stretching  Nerve gliding

## 2017-06-15 NOTE — MR AVS SNAPSHOT
After Visit Summary   6/15/2017    Maegan Lira    MRN: [id number]           Patient Information     Date Of Birth          1970        Visit Information        Provider Department      6/15/2017 10:30 AM Felicia Summers [facility]        Today's Diagnoses     Carpal tunnel syndrome, bilateral           Follow-ups after your visit        Your next 10 appointments already scheduled     Jun 20, 2017 12:00 PM CDT   [facility]    [address] [id number] [phone]            Jun 29, 2017  9:30 AM CDT   CINDY Hand with Felicia Summers   [facility] ([facility])    [address] [id number] [phone]              Who to contact     If you have questions or need follow up information about today's clinic visit or your schedule please contact [facility] directly at [phone].  Normal or non-critical lab and imaging results will be communicated to you by Nova Ratiohart, letter or phone within 4 business days after the clinic has received the results. If you do not hear from us within 7 days, please contact the clinic through ProtoGeot or phone. If you have a critical or abnormal lab result, we will notify you by phone as soon as possible.  Submit refill requests through Mesosphere or call your pharmacy and they will forward the refill request to us. Please allow 3 business days for your refill to be completed.          Additional Information About Your Visit        Nova Ratiohart Information     Mesosphere gives you secure access to your electronic health record. If you see a primary care provider, you can also send messages to your care team and make appointments. If you have questions, please call your primary care clinic.  If you do not have a primary care provider, please call [phone] and they will assist you.        Care EveryWhere ID     This is your  Care EveryWhere ID. This could be used by other organizations to access your [place] medical records  [id number]         Blood Pressure from Last 3 Encounters:   05/24/17 110/72   05/12/17 122/90   04/26/17 124/89    Weight from Last 3 Encounters:   05/12/17 89.8 kg (198 lb)   04/26/17 89 kg (196 lb 4.8 oz)   12/15/16 92.1 kg (203 lb)              We Performed the Following     INFRARED THERAPY     MANUAL THER TECH,1+REGIONS,EA 15 MIN     NEUROMUSCULAR RE-EDUCATION     THERAPEUTIC EXERCISES        Primary Care Provider Office Phone # Fax #    Jelly Mcarthur,  [phone]       [facility] [address] [facility] [address]  [facility] [address]        Thank you!     Thank you for choosing [facility]  for your care. Our goal is always to provide you with excellent care. Hearing back from our patients is one way we can continue to improve our services. Please take a few minutes to complete the written survey that you may receive in the mail after your visit with us. Thank you!             Your Updated Medication List - Protect others around you: Learn how to safely use, store and throw away your medicines at www.disposemymeds.org.          This list is accurate as of: 6/15/17  2:27 PM.  Always use your most recent med list.                   Brand Name Dispense Instructions for use    Butalbital-APAP-Caffeine -40 MG Caps     30 capsule    Take one capsule as needed for migraine. Max one capsule daily. Do not take with opiate pain medications including oxycodone.       diazepam 5 MG tablet    VALIUM    30 tablet    Take 1 tablet (5 mg) by mouth every 6 hours as needed for anxiety, sleep or muscle spasms If you take this medication, do not take any other muscle relaxants.       * DULoxetine 30 MG EC capsule    CYMBALTA    90 capsule    TAKE 1 CAPSULE BY MOUTH EVERY DAY       * DULoxetine 30 MG EC capsule    CYMBALTA    90 capsule    TAKE 1 CAPSULE BY MOUTH EVERY DAY       hydrOXYzine 25 MG  tablet    ATARAX    60 tablet    Take 1-2 tablets (25-50 mg) by mouth every 6 hours as needed for itching       metaxalone 800 MG tablet    SKELAXIN    270 tablet    Take 0.5-1 tablets (400-800 mg) by mouth 3 times daily as needed for moderate pain       naproxen 375 MG tablet    NAPROSYN    60 tablet    Take 1 tablet (375 mg) by mouth 2 times daily as needed for moderate pain       omeprazole 20 MG tablet     30 tablet    Take 1 tablet (20 mg) by mouth daily as needed       ondansetron 4 MG ODT tab    ZOFRAN-ODT    30 tablet    Take 1 tablet (4 mg) by mouth daily as needed for nausea       senna-docusate 8.6-50 MG per tablet    KATIANA-COLACE    90 tablet    Take 1-2 tablets by mouth 2 times daily as needed for constipation       UNABLE TO FIND      Take 10 drops by mouth 2 times daily MEDICATION NAME: CBD Max Hemp Oil       * Notice:  This list has 2 medication(s) that are the same as other medications prescribed for you. Read the directions carefully, and ask your doctor or other care provider to review them with you.

## 2017-06-20 ENCOUNTER — THERAPY VISIT (OUTPATIENT)
Dept: OCCUPATIONAL THERAPY | Facility: CLINIC | Age: 47
End: 2017-06-20
Payer: COMMERCIAL

## 2017-06-20 DIAGNOSIS — G56.03 CARPAL TUNNEL SYNDROME, BILATERAL: ICD-10-CM

## 2017-06-20 PROCEDURE — 97110 THERAPEUTIC EXERCISES: CPT | Mod: GO | Performed by: OCCUPATIONAL THERAPIST

## 2017-06-20 PROCEDURE — 97112 NEUROMUSCULAR REEDUCATION: CPT | Mod: GO | Performed by: OCCUPATIONAL THERAPIST

## 2017-06-20 PROCEDURE — 97026 INFRARED THERAPY: CPT | Mod: GO | Performed by: OCCUPATIONAL THERAPIST

## 2017-06-20 PROCEDURE — 97140 MANUAL THERAPY 1/> REGIONS: CPT | Mod: GO | Performed by: OCCUPATIONAL THERAPIST

## 2017-06-20 NOTE — MR AVS SNAPSHOT
After Visit Summary   6/20/2017    Maegan Lira    MRN: [id number]           Patient Information     Date Of Birth          1970        Visit Information        Provider Department      6/20/2017 12:00 PM Felicia Summers [facility]        Today's Diagnoses     Carpal tunnel syndrome, bilateral           Follow-ups after your visit        Your next 10 appointments already scheduled     Jun 29, 2017  9:30 AM CDT   CINDY Hand with Felicia Adam   [facility] ([facility])    [address] [place] [id number] [phone]              Who to contact     If you have questions or need follow up information about today's clinic visit or your schedule please contact [facility] directly at [phone].  Normal or non-critical lab and imaging results will be communicated to you by MyChart, letter or phone within 4 business days after the clinic has received the results. If you do not hear from us within 7 days, please contact the clinic through MyChart or phone. If you have a critical or abnormal lab result, we will notify you by phone as soon as possible.  Submit refill requests through Greenlight Payments or call your pharmacy and they will forward the refill request to us. Please allow 3 business days for your refill to be completed.          Additional Information About Your Visit        MyChart Information     Greenlight Payments gives you secure access to your electronic health record. If you see a primary care provider, you can also send messages to your care team and make appointments. If you have questions, please call your primary care clinic.  If you do not have a primary care provider, please call [phone] and they will assist you.        Care EveryWhere ID     This is your Care EveryWhere ID. This could be used by other organizations to access your [place] medical records  [id number]         Blood Pressure from Last 3 Encounters:   05/24/17 110/72    05/12/17 122/90   04/26/17 124/89    Weight from Last 3 Encounters:   05/12/17 89.8 kg (198 lb)   04/26/17 89 kg (196 lb 4.8 oz)   12/15/16 92.1 kg (203 lb)              We Performed the Following     INFRARED THERAPY     MANUAL THER TECH,1+REGIONS,EA 15 MIN     NEUROMUSCULAR RE-EDUCATION     THERAPEUTIC EXERCISES        Primary Care Provider Office Phone # Fax #    Jelly Mcarthur [phone]       [facility] [address] [facility] [address]  [facility] [address]        Thank you!     Thank you for choosing [facility]  for your care. Our goal is always to provide you with excellent care. Hearing back from our patients is one way we can continue to improve our services. Please take a few minutes to complete the written survey that you may receive in the mail after your visit with us. Thank you!             Your Updated Medication List - Protect others around you: Learn how to safely use, store and throw away your medicines at www.disposemymeds.org.          This list is accurate as of: 6/20/17  2:06 PM.  Always use your most recent med list.                   Brand Name Dispense Instructions for use    Butalbital-APAP-Caffeine -40 MG Caps     30 capsule    Take one capsule as needed for migraine. Max one capsule daily. Do not take with opiate pain medications including oxycodone.       diazepam 5 MG tablet    VALIUM    30 tablet    Take 1 tablet (5 mg) by mouth every 6 hours as needed for anxiety, sleep or muscle spasms If you take this medication, do not take any other muscle relaxants.       * DULoxetine 30 MG EC capsule    CYMBALTA    90 capsule    TAKE 1 CAPSULE BY MOUTH EVERY DAY       * DULoxetine 30 MG EC capsule    CYMBALTA    90 capsule    TAKE 1 CAPSULE BY MOUTH EVERY DAY       hydrOXYzine 25 MG tablet    ATARAX    60 tablet    Take 1-2 tablets (25-50 mg) by mouth every 6 hours as needed for itching       metaxalone 800 MG tablet    SKELAXIN    270 tablet    Take 0.5-1  tablets (400-800 mg) by mouth 3 times daily as needed for moderate pain       naproxen 375 MG tablet    NAPROSYN    60 tablet    Take 1 tablet (375 mg) by mouth 2 times daily as needed for moderate pain       omeprazole 20 MG tablet     30 tablet    Take 1 tablet (20 mg) by mouth daily as needed       ondansetron 4 MG ODT tab    ZOFRAN-ODT    30 tablet    Take 1 tablet (4 mg) by mouth daily as needed for nausea       senna-docusate 8.6-50 MG per tablet    KATIANA-COLACE    90 tablet    Take 1-2 tablets by mouth 2 times daily as needed for constipation       UNABLE TO FIND      Take 10 drops by mouth 2 times daily MEDICATION NAME: CBD Max Hemp Oil       * Notice:  This list has 2 medication(s) that are the same as other medications prescribed for you. Read the directions carefully, and ask your doctor or other care provider to review them with you.

## 2017-06-20 NOTE — PROGRESS NOTES
SOAP note objective information for 6/20/2017.    Please refer to the daily flowsheet for treatment today, total treatment time and time spent performing 1:1 timed codes.       Pain Level Report  VAS(0-10) 6/1/2017 6/8/2017 6/15/2017 6/20/2017       At Rest: 7 tingling  R: 6  L: 6 7 tingling  R: 6  L: 6 7 tingling  R: 6  L: 6 7 tingling  R: 6  L: 6       With Use: R: 8  L: 8 R: 8  L: 8 R: 8  L: 8 R: 8  L: 8           Report of Pain:  Location:  All fingers  Description:  pain  Frequency:  constant    Duration:  lingers  Exacerbated by:  use  Relieved by:  meds (somewhat)  Progression: improving    Special Tests:   Date 6/1/2017        Left Right           Tinels CT             Tinels PIN             Tinels DSRN             Tinels cubital tunnel             Tinels Guyons Canal             Phalen's   + +           Elbow Flexion Test   + +               ULTT ( % of full glide )  Date 6/1/2017 6/8/17 6/20/2017      Median nerve                 Ulnar nerve    Radial nerve  R:  + at 90 degrees arm abd and wrist extended  L:  + at 60 with wrist extended  R:  + at 90 degrees arm abd and wrist extended  L:  + at 60 with wrist extended R:  + at 60 degrees arm abd and wrist straight  L:  + at 60 with wrist extended        Cervical Screen: (+ or -)  Date 6/1/2017 12/15/2016      Active Comments Passive Comments     Flexion - -      Extension -       Lateral Flexion  Right -      Lateral Flexion  Left -      Rotation Right heaviness      Rotation Left heaviness      Compression Neck -        TOS Special Tests  Date 6/1/2017         Left Right Left Right Left Right Left Right Left Right Left Right   Inderjit Test + +             Costoclavicular Test + +             Rae's Test               Adson's Test                   Posture:  []              Normal   []             Forward Neck Posture  [x]             Rounded Forward Shoulders  []             Shoulder Height Difference  Comments:    Patient's proximal musculature at shoulder  appears imbalanced with tight pectoralis muscles, subscapularis, upper trapezius, lattisimus and weak scapular stabilizers.  This pattern contributes to overuse of distal musculature.    Edema:  [x]             None  []              Mild   []             Moderate  []              Severe     []              of affected part      Sensation: []              WNL throughout all nerve distributions; per patient report    [x]              Decreased  [x]              Median    [x]             Ulnar    []             Radial nerve distribution    []              See Sensory Evaluation    Description:    STRENGTH:   (Measured in pounds)     2017        Trials Right Left Right Left Right Left  Right Left   1  2  3  Av#-       65#-           3 pt Pinch 2017      Trials Right Left Right Left Right Left  Right Left   1  2  3  Av#-       17#-           Lateral Pinch 2017        Trials Right Left Right Left Right Left  Right Left   1  2  3  Av#-       21#-               Home Exercise Program:  Foam roller massage and stretch pecs  Icing prn  Proximal median nerve glide  Massage fa and upper arm (bicep) (partner to use roller bar to massage)  Clock pec stretch  Latissimus stretch  FM LEP (bilateral)  Extensor stretches  Table top flexor stretch  tricep stretch  Table top flexor stretch  [place] massage  Epsom salts  Can massage tricep  Core strengthening and Lower trap squeezes with nerve gliding  Trigger massage to outer elbow with flexion / extension of elbow  Neck lateral bend with median nerve glide  Use long dowel to push into tight pecs  Back extension exercises over therapy ball  Bullet prosource foam roller for deeper massage    Next Visit:  Laser  Mfr  Stretching  Nerve gliding

## 2017-06-26 ENCOUNTER — MYC MEDICAL ADVICE (OUTPATIENT)
Dept: PALLIATIVE MEDICINE | Facility: CLINIC | Age: 47
End: 2017-06-26

## 2017-06-26 NOTE — LETTER
RE: Maegan Lira  [address] [facility] [address]      June 28, 2017    To whom it may concern:     Maegan Lira has been under my care for chronic pain since February 2016. 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  This condition impacts her daily activity and interferes with her ability to manage demanding and challenging situations.     Ms Lira's condition meets the definition of disability under the Americans with Disabilities Act.     It is my professional opinion the the presence of her emotional support animals will alleviate Ms Lopez's anxiety disorder. Her dogs Juan and Tank serve this purpose and that it is appropriate for all reasonable accommodations to be made for Ms Lira to reside with her support animals in her new home.     Sincerely,           TONI Fournier, NP-C  [facility]

## 2017-06-29 ENCOUNTER — THERAPY VISIT (OUTPATIENT)
Dept: OCCUPATIONAL THERAPY | Facility: CLINIC | Age: 47
End: 2017-06-29
Payer: COMMERCIAL

## 2017-06-29 DIAGNOSIS — G56.03 CARPAL TUNNEL SYNDROME, BILATERAL: ICD-10-CM

## 2017-06-29 PROCEDURE — 97140 MANUAL THERAPY 1/> REGIONS: CPT | Mod: GO | Performed by: OCCUPATIONAL THERAPIST

## 2017-06-29 PROCEDURE — 97112 NEUROMUSCULAR REEDUCATION: CPT | Mod: GO | Performed by: OCCUPATIONAL THERAPIST

## 2017-06-29 PROCEDURE — 97110 THERAPEUTIC EXERCISES: CPT | Mod: GO | Performed by: OCCUPATIONAL THERAPIST

## 2017-06-29 PROCEDURE — 97026 INFRARED THERAPY: CPT | Mod: GO | Performed by: OCCUPATIONAL THERAPIST

## 2017-06-29 NOTE — MR AVS SNAPSHOT
After Visit Summary   6/29/2017    Maegan Lira    MRN: [id number]           Patient Information     Date Of Birth          1970        Visit Information        Provider Department      6/29/2017 9:30 AM Felicia Summers [facility]        Today's Diagnoses     Carpal tunnel syndrome, bilateral           Follow-ups after your visit        Who to contact     If you have questions or need follow up information about today's clinic visit or your schedule please contact [facility] directly at [phone].  Normal or non-critical lab and imaging results will be communicated to you by 2d2chart, letter or phone within 4 business days after the clinic has received the results. If you do not hear from us within 7 days, please contact the clinic through One Source Networkst or phone. If you have a critical or abnormal lab result, we will notify you by phone as soon as possible.  Submit refill requests through Secret or call your pharmacy and they will forward the refill request to us. Please allow 3 business days for your refill to be completed.          Additional Information About Your Visit        MyChart Information     Secret gives you secure access to your electronic health record. If you see a primary care provider, you can also send messages to your care team and make appointments. If you have questions, please call your primary care clinic.  If you do not have a primary care provider, please call [phone] and they will assist you.        Care EveryWhere ID     This is your Care EveryWhere ID. This could be used by other organizations to access your [place] medical records  [id number]         Blood Pressure from Last 3 Encounters:   05/24/17 110/72   05/12/17 122/90   04/26/17 124/89    Weight from Last 3 Encounters:   05/12/17 89.8 kg (198 lb)   04/26/17 89 kg (196 lb 4.8 oz)   12/15/16 92.1 kg (203 lb)              We Performed the Following     INFRARED THERAPY     MANUAL THER  TECH,1+REGIONS,EA 15 MIN     NEUROMUSCULAR RE-EDUCATION     THERAPEUTIC EXERCISES        Primary Care Provider Office Phone # Fax #    Jelly Mcarthur [phone]       [facility] [address] [facility] [address]  [facility] [address]        Equal Access to Services     KENYON WILKS : Hadii aad ku hadasho Soomaali, waaxda luqadaha, qaybta kaalmada adeegyada, waxay idiin hayaan adeeg khkerrysh lalisa nathan. So [facility] [phone].    ATENCIÓN: Si habla español, tiene a alejo disposición servicios gratuitos de asistencia lingüística. Peggy al [phone].    We comply with applicable federal civil rights laws and [place] laws. We do not discriminate on the basis of race, color, national origin, age, disability sex, sexual orientation or gender identity.            Thank you!     Thank you for choosing [facility]  for your care. Our goal is always to provide you with excellent care. Hearing back from our patients is one way we can continue to improve our services. Please take a few minutes to complete the written survey that you may receive in the mail after your visit with us. Thank you!             Your Updated Medication List - Protect others around you: Learn how to safely use, store and throw away your medicines at www.disposemymeds.org.          This list is accurate as of: 6/29/17 11:22 AM.  Always use your most recent med list.                   Brand Name Dispense Instructions for use Diagnosis    Butalbital-APAP-Caffeine -40 MG Caps     30 capsule    Take one capsule as needed for migraine. Max one capsule daily. Do not take with opiate pain medications including oxycodone.    Migraine without status migrainosus, not intractable, unspecified migraine type       diazepam 5 MG tablet    VALIUM    30 tablet    Take 1 tablet (5 mg) by mouth every 6 hours as needed for anxiety, sleep or muscle spasms If you take this medication, do not take any other muscle relaxants.    Fibromyalgia       *  DULoxetine 30 MG EC capsule    CYMBALTA    90 capsule    TAKE 1 CAPSULE BY MOUTH EVERY DAY    Chronic pain syndrome       * DULoxetine 30 MG EC capsule    CYMBALTA    90 capsule    TAKE 1 CAPSULE BY MOUTH EVERY DAY    Chronic pain syndrome       hydrOXYzine 25 MG tablet    ATARAX    60 tablet    Take 1-2 tablets (25-50 mg) by mouth every 6 hours as needed for itching    Fibromyalgia, Persistent disorder of initiating or maintaining sleep       metaxalone 800 MG tablet    SKELAXIN    270 tablet    Take 0.5-1 tablets (400-800 mg) by mouth 3 times daily as needed for moderate pain    Muscle spasm       naproxen 375 MG tablet    NAPROSYN    60 tablet    Take 1 tablet (375 mg) by mouth 2 times daily as needed for moderate pain    Myofacial muscle pain       omeprazole 20 MG tablet     30 tablet    Take 1 tablet (20 mg) by mouth daily as needed    Nausea       ondansetron 4 MG ODT tab    ZOFRAN-ODT    30 tablet    Take 1 tablet (4 mg) by mouth daily as needed for nausea    Nausea       senna-docusate 8.6-50 MG per tablet    KATIANA-COLACE    90 tablet    Take 1-2 tablets by mouth 2 times daily as needed for constipation    Constipation, unspecified constipation type       UNABLE TO FIND      Take 10 drops by mouth 2 times daily MEDICATION NAME: CBD Max Hemp Oil        * Notice:  This list has 2 medication(s) that are the same as other medications prescribed for you. Read the directions carefully, and ask your doctor or other care provider to review them with you.

## 2017-06-29 NOTE — PROGRESS NOTES
Progress Note - Hand Therapy    Current Date:  6/29/2017    Diagnosis:  Bilateral hand pain/weakness  DOI:  October 2015    Number of visits to date:  4    Reporting period is 6/1/2017 to 6/29/2017.    Subjectve:   Subjective changes as noted by patient:  Soaking in epsom baths really helped.  Doing a lot of moving, but pain is   Staying lower.  Function still improving.  Functional changes noted by patient:  Improvement in Self Care Tasks (dressing, eating, bathing, hygiene/toileting), Work Tasks, Sleep Patterns, Recreational Activities, Household Chores and Driving   Patient has noted adverse reaction to:  None        Objective:  Pain Level Report  VAS(0-10) 6/1/2017 6/8/2017 6/15/2017 6/20/2017 6/29/2017      At Rest: 7 tingling  R: 6  L: 6 7 tingling  R: 6  L: 6 7 tingling  R: 6  L: 6 7 tingling  R: 6  L: 6 6-7 tingling  R: 5  L: 5      With Use: R: 8  L: 8 R: 8  L: 8 R: 8  L: 8 R: 8  L: 8 R: 8  L: 8          Report of Pain:  Location:  All fingers  Description:  pain  Frequency:  constant    Duration:  lingers  Exacerbated by:  use  Relieved by:  meds (somewhat)  Progression: improving    Special Tests:   Date 6/1/2017 6/29/2017       Left Right Left Right         Tinels CT             Tinels PIN             Tinels DSRN             Tinels cubital tunnel             Tinels [place]             Phalen's   + + - +         Elbow Flexion Test   + + - -             ULTT ( % of full glide )  Date 6/1/2017 6/8/17 6/20/2017 6/29/2017     Median nerve                 Ulnar nerve    Radial nerve  R:  + at 90 degrees arm abd and wrist extended  L:  + at 60 with wrist extended  R:  + at 90 degrees arm abd and wrist extended  L:  + at 60 with wrist extended R:  + at 60 degrees arm abd and wrist straight  L:  + at 60 with wrist extended R:  + at 45 degrees arm abd and wrist straight  L:  + at 45 with wrist extended       Cervical Screen: (+ or -)  Date 6/1/2017 12/15/2016      Active Comments Passive Comments     Flexion  - -      Extension -       Lateral Flexion  Right -      Lateral Flexion  Left -      Rotation Right heaviness      Rotation Left heaviness      Compression Neck -        TOS Special Tests  Date 2017        Left Right Left Right Left Right Left Right Left Right Left Right   Inderjit Test + + + +           Costoclavicular Test + + + +           Rae's Test               Adson's Test                   Posture:  []              Normal   []             Forward Neck Posture  [x]             Rounded Forward Shoulders  []             Shoulder Height Difference  Comments:    Patient's proximal musculature at shoulder appears imbalanced with tight pectoralis muscles, subscapularis, upper trapezius, lattisimus and weak scapular stabilizers.  This pattern contributes to overuse of distal musculature.    Edema:  [x]             None  []              Mild   []             Moderate  []              Severe     []              of affected part      Sensation: []              WNL throughout all nerve distributions; per patient report    [x]              Decreased  [x]              Median    [x]             Ulnar    []             Radial nerve distribution    []              See Sensory Evaluation    Description:    STRENGTH:   (Measured in pounds)     2017       Trials Right Left Right Left Right Left  Right Left   1  2  3  Av#-       65#-       65#       68#         3 pt Pinch 2017      Trials Right Left Right Left Right Left  Right Left   1  2  3  Av#-       17#-           Lateral Pinch 2017          Trials Right Left Right Left Right Left  Right Left   1  2  3  Av#-       21#-               Assessment:  Response to therapy has been improvement to:  Flexibility:  less tightness in involved muscles  Pain:  intensity of pain is decreased   strength did decrease.  Possibly due to overuse with moving.    Overall Assessment:  Patient's symptoms are  resolving.  STG/LTG:  STGoals have been reviewed and some progress or achievement has occurred;  see goal sheet for details and updates.    Plan:  Frequency/Duration:  Recommend continuing to see patient  1 X week, once daily  for 3 months  Appropriateness of Rx I have re-evaluated this patient and find that the nature, scope, duration and intensity of the therapy is appropriate for the medical condition of the patient.    Recommendations for Continued Therapy      Home Exercise Program:  Foam roller massage and stretch pecs  Icing prn  Proximal median nerve glide  Massage fa and upper arm (bicep) (partner to use roller bar to massage)  Clock pec stretch  Latissimus stretch  FM LEP (bilateral)  Extensor stretches  Table top flexor stretch  tricep stretch  Table top flexor stretch  [place] massage  Epsom salts  Can massage tricep  Core strengthening and Lower trap squeezes with nerve gliding  Trigger massage to outer elbow with flexion / extension of elbow  Neck lateral bend with median nerve glide  Use long dowel to push into tight pecs  Back extension exercises over therapy ball  Bullet prosource foam roller for deeper massage    Next Visit:  Laser  Mfr  Stretching  Nerve gliding

## 2017-07-11 ENCOUNTER — TRANSFERRED RECORDS (OUTPATIENT)
Dept: HEALTH INFORMATION MANAGEMENT | Facility: CLINIC | Age: 47
End: 2017-07-11

## 2017-07-15 ENCOUNTER — APPOINTMENT (OUTPATIENT)
Dept: ULTRASOUND IMAGING | Facility: CLINIC | Age: 47
End: 2017-07-15
Attending: EMERGENCY MEDICINE
Payer: COMMERCIAL

## 2017-07-15 ENCOUNTER — HOSPITAL ENCOUNTER (EMERGENCY)
Facility: CLINIC | Age: 47
Discharge: HOME | End: 2017-07-15
Attending: EMERGENCY MEDICINE | Admitting: EMERGENCY MEDICINE
Payer: COMMERCIAL

## 2017-07-15 ENCOUNTER — NURSE TRIAGE (OUTPATIENT)
Dept: NURSING | Facility: CLINIC | Age: 47
End: 2017-07-15

## 2017-07-15 ENCOUNTER — APPOINTMENT (OUTPATIENT)
Dept: CT IMAGING | Facility: CLINIC | Age: 47
End: 2017-07-15
Attending: EMERGENCY MEDICINE
Payer: COMMERCIAL

## 2017-07-15 VITALS
RESPIRATION RATE: 18 BRPM | TEMPERATURE: 98.7 F | HEART RATE: 99 BPM | WEIGHT: 195 LBS | OXYGEN SATURATION: 99 % | BODY MASS INDEX: 34.55 KG/M2 | SYSTOLIC BLOOD PRESSURE: 140 MMHG | DIASTOLIC BLOOD PRESSURE: 93 MMHG | HEIGHT: 63 IN

## 2017-07-15 DIAGNOSIS — R10.11 RUQ ABDOMINAL PAIN: ICD-10-CM

## 2017-07-15 LAB
ALBUMIN SERPL-MCNC: 4.1 G/DL (ref 3.4–5)
ALBUMIN UR-MCNC: NEGATIVE MG/DL
ALP SERPL-CCNC: 78 U/L (ref 40–150)
ALT SERPL W P-5'-P-CCNC: 25 U/L (ref 0–50)
ANION GAP SERPL CALCULATED.3IONS-SCNC: 10 MMOL/L (ref 3–14)
APPEARANCE UR: CLEAR
AST SERPL W P-5'-P-CCNC: 17 U/L (ref 0–45)
BASOPHILS # BLD AUTO: 0 10E9/L (ref 0–0.2)
BASOPHILS NFR BLD AUTO: 0.2 %
BILIRUB SERPL-MCNC: 0.3 MG/DL (ref 0.2–1.3)
BILIRUB UR QL STRIP: NEGATIVE
BUN SERPL-MCNC: 13 MG/DL (ref 7–30)
CALCIUM SERPL-MCNC: 9.4 MG/DL (ref 8.5–10.1)
CHLORIDE SERPL-SCNC: 105 MMOL/L (ref 94–109)
CO2 SERPL-SCNC: 25 MMOL/L (ref 20–32)
COLOR UR AUTO: NORMAL
CREAT SERPL-MCNC: 0.86 MG/DL (ref 0.52–1.04)
DIFFERENTIAL METHOD BLD: NORMAL
EOSINOPHIL # BLD AUTO: 0.1 10E9/L (ref 0–0.7)
EOSINOPHIL NFR BLD AUTO: 1.7 %
ERYTHROCYTE [DISTWIDTH] IN BLOOD BY AUTOMATED COUNT: 13.2 % (ref 10–15)
GFR SERPL CREATININE-BSD FRML MDRD: 70 ML/MIN/1.7M2
GLUCOSE SERPL-MCNC: 92 MG/DL (ref 70–99)
GLUCOSE UR STRIP-MCNC: NEGATIVE MG/DL
HCG SERPL QL: NEGATIVE
HCT VFR BLD AUTO: 41.2 % (ref 35–47)
HGB BLD-MCNC: 14.5 G/DL (ref 11.7–15.7)
HGB UR QL STRIP: NEGATIVE
IMM GRANULOCYTES # BLD: 0 10E9/L (ref 0–0.4)
IMM GRANULOCYTES NFR BLD: 0.2 %
KETONES UR STRIP-MCNC: NEGATIVE MG/DL
LEUKOCYTE ESTERASE UR QL STRIP: NEGATIVE
LIPASE SERPL-CCNC: 152 U/L (ref 73–393)
LYMPHOCYTES # BLD AUTO: 1.5 10E9/L (ref 0.8–5.3)
LYMPHOCYTES NFR BLD AUTO: 34.7 %
MCH RBC QN AUTO: 31.3 PG (ref 26.5–33)
MCHC RBC AUTO-ENTMCNC: 35.2 G/DL (ref 31.5–36.5)
MCV RBC AUTO: 89 FL (ref 78–100)
MONOCYTES # BLD AUTO: 0.4 10E9/L (ref 0–1.3)
MONOCYTES NFR BLD AUTO: 10.4 %
NEUTROPHILS # BLD AUTO: 2.2 10E9/L (ref 1.6–8.3)
NEUTROPHILS NFR BLD AUTO: 52.8 %
NITRATE UR QL: NEGATIVE
NRBC # BLD AUTO: 0 10*3/UL
NRBC BLD AUTO-RTO: 0 /100
PH UR STRIP: 7 PH (ref 5–7)
PLATELET # BLD AUTO: 331 10E9/L (ref 150–450)
POTASSIUM SERPL-SCNC: 3.8 MMOL/L (ref 3.4–5.3)
PROT SERPL-MCNC: 7.8 G/DL (ref 6.8–8.8)
RBC # BLD AUTO: 4.64 10E12/L (ref 3.8–5.2)
RBC #/AREA URNS AUTO: 0 /HPF (ref 0–2)
SODIUM SERPL-SCNC: 140 MMOL/L (ref 133–144)
SP GR UR STRIP: 1 (ref 1–1.03)
URN SPEC COLLECT METH UR: NORMAL
UROBILINOGEN UR STRIP-MCNC: NORMAL MG/DL (ref 0–2)
WBC # BLD AUTO: 4.2 10E9/L (ref 4–11)
WBC #/AREA URNS AUTO: 0 /HPF (ref 0–2)

## 2017-07-15 PROCEDURE — 96361 HYDRATE IV INFUSION ADD-ON: CPT

## 2017-07-15 PROCEDURE — 25000125 ZZHC RX 250: Performed by: EMERGENCY MEDICINE

## 2017-07-15 PROCEDURE — 76705 ECHO EXAM OF ABDOMEN: CPT

## 2017-07-15 PROCEDURE — 80053 COMPREHEN METABOLIC PANEL: CPT | Performed by: EMERGENCY MEDICINE

## 2017-07-15 PROCEDURE — 74177 CT ABD & PELVIS W/CONTRAST: CPT

## 2017-07-15 PROCEDURE — 99285 EMERGENCY DEPT VISIT HI MDM: CPT | Mod: 25

## 2017-07-15 PROCEDURE — 83690 ASSAY OF LIPASE: CPT | Performed by: EMERGENCY MEDICINE

## 2017-07-15 PROCEDURE — 25000128 H RX IP 250 OP 636: Performed by: EMERGENCY MEDICINE

## 2017-07-15 PROCEDURE — 84703 CHORIONIC GONADOTROPIN ASSAY: CPT | Performed by: EMERGENCY MEDICINE

## 2017-07-15 PROCEDURE — 85025 COMPLETE CBC W/AUTO DIFF WBC: CPT | Performed by: EMERGENCY MEDICINE

## 2017-07-15 PROCEDURE — 81001 URINALYSIS AUTO W/SCOPE: CPT | Performed by: EMERGENCY MEDICINE

## 2017-07-15 PROCEDURE — 96360 HYDRATION IV INFUSION INIT: CPT | Mod: 59

## 2017-07-15 RX ORDER — IOPAMIDOL 755 MG/ML
98 INJECTION, SOLUTION INTRAVASCULAR ONCE
Status: COMPLETED | OUTPATIENT
Start: 2017-07-15 | End: 2017-07-15

## 2017-07-15 RX ADMIN — SODIUM CHLORIDE 70 ML: 9 INJECTION, SOLUTION INTRAVENOUS at 12:30

## 2017-07-15 RX ADMIN — IOPAMIDOL 98 ML: 755 INJECTION, SOLUTION INTRAVENOUS at 12:30

## 2017-07-15 RX ADMIN — SODIUM CHLORIDE 1000 ML: 9 INJECTION, SOLUTION INTRAVENOUS at 10:49

## 2017-07-15 ASSESSMENT — ENCOUNTER SYMPTOMS
NAUSEA: 0
FEVER: 0
VOMITING: 0
ABDOMINAL PAIN: 1
DYSURIA: 0

## 2017-07-15 NOTE — ED AVS SNAPSHOT
Emergency Department    [address] [facility] [address]    Phone:  [phone]    Fax:  [phone]                                       Maegan Lira   MRN: [id number]    Department:   Emergency Department   Date of Visit:  7/15/2017           After Visit Summary Signature Page     I have received my discharge instructions, and my questions have been answered. I have discussed any challenges I see with this plan with the nurse or doctor.    ..........................................................................................................................................  Patient/Patient Representative Signature      ..........................................................................................................................................  Patient Representative Print Name and Relationship to Patient    ..................................................               ................................................  Date                                            Time    ..........................................................................................................................................  Reviewed by Signature/Title    ...................................................              ..............................................  Date                                                            Time

## 2017-07-15 NOTE — ED PROVIDER NOTES
History     Chief Complaint:  Abdominal Pain      HPI   Maegan Lira is a 46 year old female who presents with abdominal pain. The patient began having some right upper quadrant pain with specific movement on Wednesday. The pain got progressively worse throughout the week. Today she called to schedule a doctor's appointment and the nurse triage referred her to the emergency department for her symptoms. The patient reports that her pain is constant now and localized around her right upper quadrant. She states lying down makes it slightly better, and sitting up/bending at the waist makes it worse. She notes that she has a history of on and off cycles of constipation/diarrhea, and this has not changed with the abdominal pain although she did recently have increased constipation as she had to take a few pain pills. She denies any dysuria, change in urination, fevers, nausea, vomiting, or other medical concerns. The patient is scheduled for a brain scan at 1230 today.      Allergies:  Ampicillin  Codeine  Keflex [Cephalexin]  Latex      Medications:    Cymbalta  Valium  Cymbalta  Zofran  Atarax  Lisa-Colace  Skelaxin  Naprosyn    Past Medical History:    Cataract  Colon polyps  Fibromyalgia  Herniated lumbar disc without myelopathy  Low back pain  Migraine  Recurrent sinus infections  Atopic rhinitis  Irritable bowel syndrome  Depression  Sjogren's syndrome  Melasma    Past Surgical History:    C Section x2  Rt foot orthopedic surgery  Tonsillectomy    Family History:    Cervical CA  Arthritis  Asthma  Obesity  Aneurysm  HLD  Depression  CAD  Substance abuse    Social History:  Presents alone   Tobacco use: Never  Alcohol use: Occasional  PCP: Jelly Mcarthur    Marital Status:        Review of Systems   Constitutional: Negative for fever.   Gastrointestinal: Positive for abdominal pain. Negative for nausea and vomiting.   Genitourinary: Negative for dysuria.   All other systems reviewed and are  "negative.    Physical Exam     Patient Vitals for the past 24 hrs:   BP Temp Temp src Pulse Resp SpO2 Height Weight   07/15/17 1310 (!) 140/93 - - - - - - -   07/15/17 1244 (!) 145/98 - - - - - - -   07/15/17 1027 (!) 147/91 98.7  F (37.1  C) Oral 99 18 99 % 1.6 m (5' 3\") 88.5 kg (195 lb)      Physical Exam  General/Appearance: appears stated age, well-groomed, appears comfortable  Eyes: EOMI, no scleral injection, no icterus  ENT: MMM  Neck: supple, nl ROM, no stiffness  Cardiovascular: RRR, nl S1S2, no m/r/g, 2+ pulses in all 4 extremities, cap refill <2sec  Respiratory: CTAB, good air movement throughout, no wheezes/rhonchi/rales, no increased WOB, no retractions  Back: no lesions  GI: abd soft, non-distended, nttp,  no HSM, no rebound, no guarding, nl BS  MSK: LITTLEJOHN, good tone, no bony abnormality  Skin: warm and well-perfused, no rash, no edema, no ecchymosis, nl turgor  Neuro: GCS 15, alert and oriented, no gross focal neuro deficits  Psych: interacts appropriately  Heme: no petechia, no purpura, no active bleeding       Emergency Department Course   Imaging:  Radiographic findings were communicated with the patient who voiced understanding of the findings.  CT Abdomen Pelvis w Contrast  IMPRESSION: No acute abnormality identified.    US Abdomen Limited  IMPRESSION: Normal right upper quadrant ultrasound.    Imaging independently reviewed and agree with radiologist interpretation.      Laboratory:  CBC: WNL (WBC 4.2, HGB 14.5, )  BMP: WNL (Creatinine 0.86)  UA: Negative   Lipase: 152  HCG: Negative    Interventions:  1049: NS 1L IV Bolus     Emergency Department Course:  Past medical records, nursing notes, and vitals reviewed.  1027: I performed an exam of the patient and obtained history, as documented above.   1149: I rechecked the patient. Explained findings to patient.    1259: I rechecked the patient. Findings and plan explained to the Patient. Patient discharged home with instructions regarding " supportive care, medications, and reasons to return. The importance of close follow-up was reviewed.      I personally reviewed the laboratory results with the Patient and answered all related questions prior to discharge.     Impression & Plan    Medical Decision Making:  This patient is a 46-year-old female presenting with several days of right upper quadrant pain. On physical exam she is not markedly tender however points to her gallbladder directly as a source of pain. An ultrasound as well as LFTs and lipase were all within normal limits. Considered atypical diagnoses such as atypical presentation of appendicitis or kidney stone. I also considered bowel obstruction or constipation. Follow-up CT was negative. Urinalysis negative for any evidence of blood or infection. She has been taking a few pain medications recently, leading to constipation however the CT was not overtly positive for a large amount of stool. She is not had any respiratory symptoms and the inferior aspect of the lungs, and CT were benign. It's possible that this is delayed gallbladder emptying and that an outpatient HIDA scan would be beneficial. At this point the patient was comfortable with discharge but no strokes or return to the ED for worsening symptoms. I have encouraged her to follow up with her PCP for further possible GI workup. I also recommended not taking aspirin as is possible that this pain is duodenal/gastritis and that it would benefit from a scope.      Diagnosis:    ICD-10-CM    1. RUQ abdominal pain R10.11        Disposition:  Discharged to home with plan as outlined above.        Phil Gaytan  7/15/2017    EMERGENCY DEPARTMENT  IPhil, am serving as a scribe at 10:27 AM on 7/15/2017 to document services personally performed by June Bruce MD based on my observations and the provider's statements to me.       June Bruce MD  07/15/17 [id number]

## 2017-07-15 NOTE — ED AVS SNAPSHOT
Emergency Department    [address] [facility] [address]    Phone:  [phone]    Fax:  [phone]                                       Maegan Lira   MRN: [id number]    Department:   Emergency Department   Date of Visit:  7/15/2017           Patient Information     Date Of Birth          1970        Your diagnoses for this visit were:     RUQ abdominal pain        You were seen by June Bruce MD.      Follow-up Information     Schedule an appointment as soon as possible for a visit with Jelly Mcarthur DO.    Specialty:  Internal Medicine    Contact information:    [facility]  [address]  [facility] [id number] [phone]          Follow up with  Emergency Department.    Specialty:  EMERGENCY MEDICINE    Why:  As needed, If symptoms worsen    Contact information:    [address] [facility] [id number] [phone]        Discharge Instructions       Discharge Instructions  Abdominal Pain    Abdominal pain can be caused by many things. Your evaluation today does not show the exact cause for your pain. Your doctor today has decided that it is unlikely your pain is due to a life threatening problem, or a problem requiring surgery or hospital admission. Sometimes those problems cannot be found right away, so it is very important that you follow up as directed.  Sometimes only the changes which occur over time allow the cause of your pain to be found.    Return to the Emergency Department for a recheck in 8-12 hours if your pain continues.  If your pain gets worse, changes in location, or feels different, return to the Emergency Department right away.    ADULTS:  Return to the Emergency Department right away if:      You get an oral temperature above 102oF or as directed by your doctor.    You have blood in your stools (bright red or black, tarry stools).    You keep throwing up or can t drink liquids.    You see blood  when you throw up.    You can t have a bowel movement or you can t pass gas.    Your stomach gets bloated or bigger.    Your skin or the whites of your eyes look yellow.    You faint.    You have bloody, frequent or painful urination.    You have new symptoms or anything that worries you.    CHILDREN:  Return to the Emergency Department right away if your child has any of the above-listed symptoms or the following:      Pushes your hand away or screams/cries when his/her belly is touched.    You notice your child is very fussy or weak.    Your child is very tired and is too tired to eat or drink.    Your child is dehydrated.  Signs of dehydration can be:  o Your infant has had no wet diapers in 4-5 hours.  o Your older child has not passed urine in 6-8 hours.  o Your infant or child starts to have dry mouth and lips, or no saliva or tears.    PREGNANT WOMEN:  Return to the Emergency Department right away if you have any of the above-listed symptoms or the following:      You have bleeding, leaking fluid or passing tissue from the vagina.    You have worse pain or cramping, or pain in your shoulder or back.    You have vomiting that will not stop.    You have painful or bloody urination.    You have a temperature of 100oF or more.    Your baby is not moving as much as usual.    You faint.    You get a bad headache with or without eye problems and abdominal pain.    You have a convulsion or seizure.    You have unusual discharge from your vagina and abdominal pain.    Abdominal pain is pretty common during pregnancy.  Your pain may or may not be related to your pregnancy. You should follow-up closely with your OB doctor so they can evaluate you and your baby.  Until you follow-up with your regular doctor, do the following:       Avoid sex and do not put anything in your vagina.    Drink clear fluids.    Only take medications approved by your doctor.    MORE INFORMATION:    Appendicitis:  A possible cause of abdominal  "pain in any person who still has their appendix is acute appendicitis. Appendicitis is often hard to diagnose.  Testing does not always rule out early appendicitis or other causes of abdominal pain. Close follow-up with your doctor and re-evaluations may be needed to figure out the reason for your abdominal pain.    Follow-up:  It is very important that you make an appointment with your clinic and go to the appointment.  If you do not follow-up with your primary doctor, it may result in missing an important development which could result in permanent injury or disability and/or lasting pain.  If there is any problem keeping your appointment, call your doctor or return to the Emergency Department.    Medications:  Take your medications as directed by your doctor today.  Before using over-the-counter medications, ask your doctor and make sure to take the medications as directed.  If you have any questions about medications, ask your doctor.    Diet:  Resume your normal diet as much as possible, but do not eat fried, fatty or spicy foods while you have pain.  Do not drink alcohol or have caffeine.  Do not smoke tobacco.    Probiotics: If you have been given an antibiotic, you may want to also take a probiotic pill or eat yogurt with live cultures. Probiotics have \"good bacteria\" to help your intestines stay healthy. Studies have shown that probiotics help prevent diarrhea and other intestine problems (including C. diff infection) when you take antibiotics. You can buy these without a prescription in the pharmacy section of the store.     If you were given a prescription for medicine here today, be sure to read all of the information (including the package insert) that comes with your prescription.  This will include important information about the medicine, its side effects, and any warnings that you need to know about.  The pharmacist who fills the prescription can provide more information and answer questions you may " have about the medicine.  If you have questions or concerns that the pharmacist cannot address, please call or return to the Emergency Department.           Remember that you can always come back to the Emergency Department if you are not able to see your regular doctor in the amount of time listed above, if you get any new symptoms, or if there is anything that worries you.          24 Hour Appointment Hotline       To make an appointment at any [facility], call [phone] ([phone]). If you don't have a family doctor or clinic, we will help you find one. [facility] are conveniently located to serve the needs of you and your family.             Review of your medicines      Our records show that you are taking the medicines listed below. If these are incorrect, please call your family doctor or clinic.        Dose / Directions Last dose taken    Butalbital-APAP-Caffeine -40 MG Caps   Quantity:  30 capsule        Take one capsule as needed for migraine. Max one capsule daily. Do not take with opiate pain medications including oxycodone.   Refills:  1        diazepam 5 MG tablet   Commonly known as:  VALIUM   Dose:  5 mg   Quantity:  30 tablet        Take 1 tablet (5 mg) by mouth every 6 hours as needed for anxiety, sleep or muscle spasms If you take this medication, do not take any other muscle relaxants.   Refills:  0        * DULoxetine 30 MG EC capsule   Commonly known as:  CYMBALTA   Quantity:  90 capsule        TAKE 1 CAPSULE BY MOUTH EVERY DAY   Refills:  1        * DULoxetine 30 MG EC capsule   Commonly known as:  CYMBALTA   Quantity:  90 capsule        TAKE 1 CAPSULE BY MOUTH EVERY DAY   Refills:  0        hydrOXYzine 25 MG tablet   Commonly known as:  ATARAX   Dose:  25-50 mg   Quantity:  60 tablet        Take 1-2 tablets (25-50 mg) by mouth every 6 hours as needed for itching   Refills:  3        metaxalone 800 MG tablet   Commonly known as:  SKELAXIN   Dose:  400-800 mg    Quantity:  270 tablet        Take 0.5-1 tablets (400-800 mg) by mouth 3 times daily as needed for moderate pain   Refills:  3        naproxen 375 MG tablet   Commonly known as:  NAPROSYN   Dose:  375 mg   Quantity:  60 tablet        Take 1 tablet (375 mg) by mouth 2 times daily as needed for moderate pain   Refills:  3        omeprazole 20 MG tablet   Dose:  20 mg   Quantity:  30 tablet        Take 1 tablet (20 mg) by mouth daily as needed   Refills:  3        ondansetron 4 MG ODT tab   Commonly known as:  ZOFRAN-ODT   Dose:  4 mg   Quantity:  30 tablet        Take 1 tablet (4 mg) by mouth daily as needed for nausea   Refills:  3        senna-docusate 8.6-50 MG per tablet   Commonly known as:  KATIANA-COLACE   Dose:  1-2 tablet   Quantity:  90 tablet        Take 1-2 tablets by mouth 2 times daily as needed for constipation   Refills:  3        UNABLE TO FIND   Dose:  10 drop        Take 10 drops by mouth 2 times daily MEDICATION NAME: CBD Max Hemp Oil   Refills:  0        * Notice:  This list has 2 medication(s) that are the same as other medications prescribed for you. Read the directions carefully, and ask your doctor or other care provider to review them with you.            Procedures and tests performed during your visit     CBC with platelets differential    CT Abdomen Pelvis w Contrast    Comprehensive metabolic panel    HCG qualitative    Lipase    Peripheral IV catheter    UA with Microscopic    US Abdomen Limited      Orders Needing Specimen Collection     None      Pending Results     Date and Time Order Name Status Description    7/15/2017 1159 CT Abdomen Pelvis w Contrast Preliminary     7/15/2017 1033 US Abdomen Limited Preliminary             Pending Culture Results     No orders found from 7/13/2017 to 7/16/2017.            Pending Results Instructions     If you had any lab results that were not finalized at the time of your Discharge, you can call the ED Lab Result RN at [phone]. You will be  contacted by this team for any positive Lab results or changes in treatment. The nurses are available 7 days a week from 10A to 6:30P.  You can leave a message 24 hours per day and they will return your call.        Test Results From Your Hospital Stay        7/15/2017 11:14 AM      Narrative     US ABDOMEN LIMITED 7/15/2017 11:06 AM    HISTORY: Right upper quadrant pain    FINDINGS: The gallbladder is normal in appearance. No gallstones or  gallbladder wall thickening. There is no sonographic Britton's sign or  biliary dilatation. The common duct measures 4 mm. The liver,  pancreas, and right kidney are unremarkable.        Impression     IMPRESSION: Normal right upper quadrant ultrasound.         7/15/2017 10:58 AM      Component Results     Component Value Ref Range & Units Status    WBC 4.2 4.0 - 11.0 10e9/L Final    RBC Count 4.64 3.8 - 5.2 10e12/L Final    Hemoglobin 14.5 11.7 - 15.7 g/dL Final    Hematocrit 41.2 35.0 - 47.0 % Final    MCV 89 78 - 100 fl Final    MCH 31.3 26.5 - 33.0 pg Final    MCHC 35.2 31.5 - 36.5 g/dL Final    RDW 13.2 10.0 - 15.0 % Final    Platelet Count 331 150 - 450 10e9/L Final    Diff Method Automated Method  Final    % Neutrophils 52.8 % Final    % Lymphocytes 34.7 % Final    % Monocytes 10.4 % Final    % Eosinophils 1.7 % Final    % Basophils 0.2 % Final    % Immature Granulocytes 0.2 % Final    Nucleated RBCs 0 0 /100 Final    Absolute Neutrophil 2.2 1.6 - 8.3 10e9/L Final    Absolute Lymphocytes 1.5 0.8 - 5.3 10e9/L Final    Absolute Monocytes 0.4 0.0 - 1.3 10e9/L Final    Absolute Eosinophils 0.1 0.0 - 0.7 10e9/L Final    Absolute Basophils 0.0 0.0 - 0.2 10e9/L Final    Abs Immature Granulocytes 0.0 0 - 0.4 10e9/L Final    Absolute Nucleated RBC 0.0  Final         7/15/2017 11:18 AM      Component Results     Component Value Ref Range & Units Status    Sodium 140 133 - 144 mmol/L Final    Potassium 3.8 3.4 - 5.3 mmol/L Final    Chloride 105 94 - 109 mmol/L Final    Carbon Dioxide  25 20 - 32 mmol/L Final    Anion Gap 10 3 - 14 mmol/L Final    Glucose 92 70 - 99 mg/dL Final    Urea Nitrogen 13 7 - 30 mg/dL Final    Creatinine 0.86 0.52 - 1.04 mg/dL Final    GFR Estimate 70 >60 mL/min/1.7m2 Final    Non  GFR Calc    GFR Estimate If Black 85 >60 mL/min/1.7m2 Final    African American GFR Calc    Calcium 9.4 8.5 - 10.1 mg/dL Final    Bilirubin Total 0.3 0.2 - 1.3 mg/dL Final    Albumin 4.1 3.4 - 5.0 g/dL Final    Protein Total 7.8 6.8 - 8.8 g/dL Final    Alkaline Phosphatase 78 40 - 150 U/L Final    ALT 25 0 - 50 U/L Final    AST 17 0 - 45 U/L Final         7/15/2017 11:16 AM      Component Results     Component Value Ref Range & Units Status    Lipase 152 73 - 393 U/L Final         7/15/2017 11:47 AM      Component Results     Component Value Ref Range & Units Status    Color Urine Straw  Final    Appearance Urine Clear  Final    Glucose Urine Negative NEG mg/dL Final    Bilirubin Urine Negative NEG Final    Ketones Urine Negative NEG mg/dL Final    Specific Gravity Urine 1.003 1.003 - 1.035 Final    Blood Urine Negative NEG Final    pH Urine 7.0 5.0 - 7.0 pH Final    Protein Albumin Urine Negative NEG mg/dL Final    Urobilinogen mg/dL Normal 0.0 - 2.0 mg/dL Final    Nitrite Urine Negative NEG Final    Leukocyte Esterase Urine Negative NEG Final    Source Midstream Urine  Final    WBC Urine 0 0 - 2 /HPF Final    RBC Urine 0 0 - 2 /HPF Final         7/15/2017 11:09 AM      Component Results     Component Value Ref Range & Units Status    HCG Qualitative Serum Negative NEG Final         7/15/2017 12:47 PM      Narrative     CT ABDOMEN AND PELVIS WITH CONTRAST 7/15/2017 12:37 PM     COMPARISON: None.    HISTORY: Right upper quadrant pain. Ultrasound negative.    TECHNIQUE: Volumetric helical acquisition of CT images from the lung  bases through the symphysis pubis after the administration of 98 mL of  Isovue 370 intravenous  contrast. Radiation dose for this scan was  reduced using  automated exposure control, adjustment of the mA and/or  kV according to patient size, or iterative reconstruction technique.    FINDINGS: The lung bases are clear. The liver, spleen, pancreas, and  adrenal glands are unremarkable. There are bilateral extrarenal  pelves. The kidneys are otherwise unremarkable. The large and small  bowel are normal in caliber. Appendix is normal. The uterus is  slightly heterogeneous which may be due to fibroids. There is no  ascites or free intraperitoneal air.         Impression     IMPRESSION: No acute abnormality identified.                Clinical Quality Measure: Blood Pressure Screening     Your blood pressure was checked while you were in the emergency department today. The last reading we obtained was  BP: (!) 145/98 . Please read the guidelines below about what these numbers mean and what you should do about them.  If your systolic blood pressure (the top number) is less than 120 and your diastolic blood pressure (the bottom number) is less than 80, then your blood pressure is normal. There is nothing more that you need to do about it.  If your systolic blood pressure (the top number) is 120-139 or your diastolic blood pressure (the bottom number) is 80-89, your blood pressure may be higher than it should be. You should have your blood pressure rechecked within a year by a primary care provider.  If your systolic blood pressure (the top number) is 140 or greater or your diastolic blood pressure (the bottom number) is 90 or greater, you may have high blood pressure. High blood pressure is treatable, but if left untreated over time it can put you at risk for heart attack, stroke, or kidney failure. You should have your blood pressure rechecked by a primary care provider within the next 4 weeks.  If your provider in the emergency department today gave you specific instructions to follow-up with your doctor or provider even sooner than that, you should follow that instruction  and not wait for up to 4 weeks for your follow-up visit.        Thank you for choosing [facility]       Thank you for choosing [facility] for your care. Our goal is always to provide you with excellent care. Hearing back from our patients is one way we can continue to improve our services. Please take a few minutes to complete the written survey that you may receive in the mail after you visit with us. Thank you!        [facility] Information     [employer] gives you secure access to your electronic health record. If you see a primary care provider, you can also send messages to your care team and make appointments. If you have questions, please call your primary care clinic.  If you do not have a primary care provider, please call [phone] and they will assist you.        Care EveryWhere ID     This is your Care EveryWhere ID. This could be used by other organizations to access your [facility] medical records  [id number]        Equal Access to Services     KENYON WILKS : Didier Ceballos, manan manzano, felicity post. So [facility] [phone].    ATENCIÓN: Si habla español, tiene a alejo disposición servicios gratuitos de asistencia lingüística. Llame al [phone].    We comply with applicable federal civil rights laws and [place] laws. We do not discriminate on the basis of race, color, national origin, age, disability sex, sexual orientation or gender identity.            After Visit Summary       This is your record. Keep this with you and show to your community pharmacist(s) and doctor(s) at your next visit.

## 2017-07-15 NOTE — TELEPHONE ENCOUNTER
"Right upper abdominal  pain , constant 4/10 on painscale but  triggered  more intensity by specific movement up to 8/10 started 2/12/17 , no known cause but moving all week .  Unsure what she will do . .Sonia Richardson RN [facility] nurse advisors.    Reason for Disposition    [1] Pain lasts > 10 minutes AND [2] age > 40 AND [3] associated chest, arm, neck, upper back or jaw pain    Additional Information    Negative: Shock suspected (e.g., cold/pale/clammy skin, too weak to stand, low BP, rapid pulse)    Negative: Difficult to awaken or acting confused  (e.g., disoriented, slurred speech)    Negative: Passed out (i.e., lost consciousness, collapsed and was not responding)    Negative: Sounds like a life-threatening emergency to the triager    Negative: Chest pain    Pain is mainly in upper abdomen  (if needed ask: \"is it mainly above the belly button?\")    Negative: Severe difficulty breathing (e.g., struggling for each breath, speaks in single words)    Negative: Shock suspected (e.g., cold/pale/clammy skin, too weak to stand, low BP, rapid pulse)    Negative: Difficult to awaken or acting confused  (e.g., disoriented, slurred speech)    Negative: Passed out (i.e., lost consciousness, collapsed and was not responding)    Negative: Visible sweat on face or sweat dripping down face    Negative: Sounds like a life-threatening emergency to the triager    Negative: Followed an abdomen (stomach) injury    Negative: Chest pain    Negative: [1] SEVERE pain (e.g., excruciating) AND [2] present > 1 hour    Negative: [1] Pain lasts > 10 minutes AND [2] age > 50    Protocols used: ABDOMINAL PAIN - UPPER-ADULT-, ABDOMINAL PAIN - FEMALE-ADULT-AH    "

## 2017-07-15 NOTE — DISCHARGE INSTRUCTIONS
Discharge Instructions  Abdominal Pain    Abdominal pain can be caused by many things. Your evaluation today does not show the exact cause for your pain. Your doctor today has decided that it is unlikely your pain is due to a life threatening problem, or a problem requiring surgery or hospital admission. Sometimes those problems cannot be found right away, so it is very important that you follow up as directed.  Sometimes only the changes which occur over time allow the cause of your pain to be found.    Return to the Emergency Department for a recheck in 8-12 hours if your pain continues.  If your pain gets worse, changes in location, or feels different, return to the Emergency Department right away.    ADULTS:  Return to the Emergency Department right away if:      You get an oral temperature above 102oF or as directed by your doctor.    You have blood in your stools (bright red or black, tarry stools).    You keep throwing up or can t drink liquids.    You see blood when you throw up.    You can t have a bowel movement or you can t pass gas.    Your stomach gets bloated or bigger.    Your skin or the whites of your eyes look yellow.    You faint.    You have bloody, frequent or painful urination.    You have new symptoms or anything that worries you.    CHILDREN:  Return to the Emergency Department right away if your child has any of the above-listed symptoms or the following:      Pushes your hand away or screams/cries when his/her belly is touched.    You notice your child is very fussy or weak.    Your child is very tired and is too tired to eat or drink.    Your child is dehydrated.  Signs of dehydration can be:  o Your infant has had no wet diapers in 4-5 hours.  o Your older child has not passed urine in 6-8 hours.  o Your infant or child starts to have dry mouth and lips, or no saliva or tears.    PREGNANT WOMEN:  Return to the Emergency Department right away if you have any of the above-listed symptoms or  the following:      You have bleeding, leaking fluid or passing tissue from the vagina.    You have worse pain or cramping, or pain in your shoulder or back.    You have vomiting that will not stop.    You have painful or bloody urination.    You have a temperature of 100oF or more.    Your baby is not moving as much as usual.    You faint.    You get a bad headache with or without eye problems and abdominal pain.    You have a convulsion or seizure.    You have unusual discharge from your vagina and abdominal pain.    Abdominal pain is pretty common during pregnancy.  Your pain may or may not be related to your pregnancy. You should follow-up closely with your OB doctor so they can evaluate you and your baby.  Until you follow-up with your regular doctor, do the following:       Avoid sex and do not put anything in your vagina.    Drink clear fluids.    Only take medications approved by your doctor.    MORE INFORMATION:    Appendicitis:  A possible cause of abdominal pain in any person who still has their appendix is acute appendicitis. Appendicitis is often hard to diagnose.  Testing does not always rule out early appendicitis or other causes of abdominal pain. Close follow-up with your doctor and re-evaluations may be needed to figure out the reason for your abdominal pain.    Follow-up:  It is very important that you make an appointment with your clinic and go to the appointment.  If you do not follow-up with your primary doctor, it may result in missing an important development which could result in permanent injury or disability and/or lasting pain.  If there is any problem keeping your appointment, call your doctor or return to the Emergency Department.    Medications:  Take your medications as directed by your doctor today.  Before using over-the-counter medications, ask your doctor and make sure to take the medications as directed.  If you have any questions about medications, ask your doctor.    Diet:   "Resume your normal diet as much as possible, but do not eat fried, fatty or spicy foods while you have pain.  Do not drink alcohol or have caffeine.  Do not smoke tobacco.    Probiotics: If you have been given an antibiotic, you may want to also take a probiotic pill or eat yogurt with live cultures. Probiotics have \"good bacteria\" to help your intestines stay healthy. Studies have shown that probiotics help prevent diarrhea and other intestine problems (including C. diff infection) when you take antibiotics. You can buy these without a prescription in the pharmacy section of the store.     If you were given a prescription for medicine here today, be sure to read all of the information (including the package insert) that comes with your prescription.  This will include important information about the medicine, its side effects, and any warnings that you need to know about.  The pharmacist who fills the prescription can provide more information and answer questions you may have about the medicine.  If you have questions or concerns that the pharmacist cannot address, please call or return to the Emergency Department.           Remember that you can always come back to the Emergency Department if you are not able to see your regular doctor in the amount of time listed above, if you get any new symptoms, or if there is anything that worries you.        "

## 2017-08-12 ENCOUNTER — MYC MEDICAL ADVICE (OUTPATIENT)
Dept: FAMILY MEDICINE | Facility: CLINIC | Age: 47
End: 2017-08-12

## 2017-08-14 ENCOUNTER — TRANSFERRED RECORDS (OUTPATIENT)
Dept: HEALTH INFORMATION MANAGEMENT | Facility: CLINIC | Age: 47
End: 2017-08-14

## 2017-08-15 ENCOUNTER — OFFICE VISIT (OUTPATIENT)
Dept: FAMILY MEDICINE | Facility: CLINIC | Age: 47
End: 2017-08-15
Payer: COMMERCIAL

## 2017-08-15 VITALS
BODY MASS INDEX: 34.02 KG/M2 | SYSTOLIC BLOOD PRESSURE: 119 MMHG | HEART RATE: 84 BPM | DIASTOLIC BLOOD PRESSURE: 79 MMHG | WEIGHT: 192 LBS | HEIGHT: 63 IN | OXYGEN SATURATION: 96 % | TEMPERATURE: 98.2 F

## 2017-08-15 DIAGNOSIS — G89.4 CHRONIC PAIN SYNDROME: ICD-10-CM

## 2017-08-15 DIAGNOSIS — M79.7 FIBROMYALGIA: Primary | ICD-10-CM

## 2017-08-15 PROCEDURE — 99215 OFFICE O/P EST HI 40 MIN: CPT | Performed by: INTERNAL MEDICINE

## 2017-08-15 RX ORDER — DULOXETIN HYDROCHLORIDE 30 MG/1
CAPSULE, DELAYED RELEASE ORAL
Qty: 90 CAPSULE | Refills: 3 | Status: SHIPPED | OUTPATIENT
Start: 2017-08-15 | End: 2018-07-13

## 2017-08-15 RX ORDER — HYDROXYZINE HYDROCHLORIDE 25 MG/1
25-50 TABLET, FILM COATED ORAL EVERY 6 HOURS PRN
Qty: 60 TABLET | Refills: 3 | Status: SHIPPED | OUTPATIENT
Start: 2017-08-15 | End: 2018-03-27

## 2017-08-15 NOTE — PROGRESS NOTES
"  SUBJECTIVE:                                                    Maegan Lira is a 46 year old female who presents to clinic today for the following health issues:      Chief Complaint   Patient presents with     Forms     Disability forms     Still has pain from bilateral tarsal tunnel. If she is on her feet for more than 45 min she will start having a lot of pain. She also notes if she isn't on her feet for more than 2 hours her feet become painful as well.   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  [place] one example is that she can't go grocery shopping and make dinner on the same day, since her feet become too painful. She needs to prioritize her tasks.  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 This has been affecting her income and recently had to downsize her home. She moved to a town home in [place] with her wife.    She used to work about 70 hrs per week but now feels can only work about 25 hours per week. 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 She has also had to hire more folks to help with the job.  A year ago with her prior PCP Dr. Talley (who is now retired from our clinic), Junie had forms filled out for disability to state the above but thought at the time most of the concerns were r/t tarsal tunnel. Now has a more firm dx of fibromyalgia. Has seen podiatrist Dr. Marroquin in the past and followed at [facility] for her CTS. She has h/o herniated discs per her report from 2015.    Met with neurologist Dr. Jacki Garcia yesterday and looked at MRI brain scans. Told she has \"fibro fog\" and some cognitive and vision changes, but no other significant findings.     She was given a referral " "for [facility] but is unsure if she will seek them out.   Her EMG reportedly improved from 2015.  Fibromyalgia neuromuscular massage therapy twice a month helps with pain, but she doesn't like that she has to pay out of pocket for it.    Notes swimming and exercise helps with pain, however she hasn't been able to swim for a number of weeks.     Takes Cymbalta and hydroxyzine daily, but still has chronic pain and fatigue. Bad flare ups 2x per month, and pt says it is a 10/10 pain. She says it will be hard to move d/t pain and this is when she will take Valium and muscle relaxer. She does not like the muscle relaxer. Has been to our [facility] in the past and found that helpful too.     Problem list and histories reviewed & adjusted, as indicated.    ROS:  Detailed as above     This document serves as a record of the services and decisions personally performed and made by Jelly Mcarthur DO. It was created on his/her behalf by Sonia Cavazos, a trained medical scribe. The creation of this document is based the provider's statements to the medical scribe.  Scribhemant Cavazos 10:27 AM, August 15, 2017   OBJECTIVE:   /79 (BP Location: Right arm, Cuff Size: Adult Large)  Pulse 84  Temp 98.2  F (36.8  C) (Oral)  Ht 1.6 m (5' 3\")  Wt 87.1 kg (192 lb)  LMP 08/04/2017 (Exact Date)  SpO2 96%  BMI 34.01 kg/m2  Body mass index is 34.01 kg/(m^2).   EXAM:  Alert, pleasant, cooperative, NAD  Nicely dressed   Massaging muscles throughout visit   Normal affect   ASSESSMENT/PLAN:   Fibromyalgia and chronic pain syndrome with h/o bilateral carpal tunnel and bilateral tarsal tunnel syndrome  Spent most of OV today discussing treatment plan and helping with paperwork  - hydrOXYzine (ATARAX) 25 MG tablet; Take 1-2 tablets (25-50 mg) by mouth every 6 hours as needed for anxiety  Dispense: 60 tablet; Refill: 3  Discussed likelihood that her mood is playing a role in chronic pain, will address this further at " "next visit. She was surprised by this but admits to \"doctor fatigue\" and is surprised that untreated dep/anxiety is a common cause of chronic pain. Asked she consider this and journal this and that she strongly consider counseling and/or medication adjustment. Already on Cymbalta 30 mg and didn't tolerate higher dose.  - DULoxetine (CYMBALTA) 30 MG EC capsule; TAKE 1 CAPSULE BY MOUTH EVERY DAY  Dispense: 90 capsule; Refill: 3  Has seen rheumatology with mildly positive antibodies for Sjogren's which was pursued after her dentist thought the gums were weak  Unsure if she ever did pursue Schoology trial where if she is tested positive for the FMa biomarker, then she could be enrolled in a study trialing a vaccine - [facility]    Patient Instructions   Disability forms filled out today   -We will send in the paperwork   Continue with great exercise   Consider starting a journal re: mood  -We can address your mood at a future visit     The information in this document, created by the medical scribe for me, accurately reflects the services I personally performed and the decisions made by me. I have reviewed and approved this document for accuracy prior to leaving the patient care area.  Jelly Mcarthur DO  10:28 AM, 08/15/17    MDM: 40 minutes spent with patient, over 50% time counseling, coordinating care and explaining about nature of the patient's conditions.    Jelly Mcarthur, DO  [facility]    "

## 2017-08-15 NOTE — NURSING NOTE
"Chief Complaint   Patient presents with     Forms     Disability forms       Initial /79 (BP Location: Right arm, Cuff Size: Adult Large)  Pulse 84  Temp 98.2  F (36.8  C) (Oral)  Ht 5' 3\" (1.6 m)  Wt 192 lb (87.1 kg)  LMP 08/04/2017 (Exact Date)  SpO2 96%  BMI 34.01 kg/m2 Estimated body mass index is 34.01 kg/(m^2) as calculated from the following:    Height as of this encounter: 5' 3\" (1.6 m).    Weight as of this encounter: 192 lb (87.1 kg).  Medication Reconciliation: complete       Soumya Zarco CMA      "

## 2017-08-15 NOTE — MR AVS SNAPSHOT
After Visit Summary   8/15/2017    Maegan Lira    MRN: [id number]           Patient Information     Date Of Birth          1970        Visit Information        Provider Department      8/15/2017 10:00 AM Jelly cMarthur, DO [facility]        Today's Diagnoses     Fibromyalgia    -  1    Chronic pain syndrome          Care Instructions    Disability forms filled out today   -We will send in the paperwork   Continue with great exercise   Consider starting a journal re: mood  -We can address your mood at a future visit           Follow-ups after your visit        Your next 10 appointments already scheduled     Sep 01, 2017 10:30 AM CDT   [facility]Y [facility] with Felicia Summers   [facility]    [address] [place] [id number] [phone]              Who to contact     If you have questions or need follow up information about today's clinic visit or your schedule please contact [facility] directly at [phone].  Normal or non-critical lab and imaging results will be communicated to you by Servergyhart, letter or phone within 4 business days after the clinic has received the results. If you do not hear from us within 7 days, please contact the clinic through CareWiret or phone. If you have a critical or abnormal lab result, we will notify you by phone as soon as possible.  Submit refill requests through SUNDAYTOZ or call your pharmacy and they will forward the refill request to us. Please allow 3 business days for your refill to be completed.          Additional Information About Your Visit        Servergyhart Information     SUNDAYTOZ gives you secure access to your electronic health record. If you see a primary care provider, you can also send messages to your care team and make appointments. If you have questions, please call your primary care clinic.  If you do not have a primary care provider, please call [phone] and they will  "assist you.        Care EveryWhere ID     This is your Care EveryWhere ID. This could be used by other organizations to access your [place] medical records  [id number]        Your Vitals Were     Pulse Temperature Height Last Period Pulse Oximetry BMI (Body Mass Index)    84 98.2  F (36.8  C) (Oral) 5' 3\" (1.6 m) 08/04/2017 (Exact Date) 96% 34.01 kg/m2       Blood Pressure from Last 3 Encounters:   08/15/17 119/79   07/15/17 (!) 140/93   05/24/17 110/72    Weight from Last 3 Encounters:   08/15/17 192 lb (87.1 kg)   07/15/17 195 lb (88.5 kg)   05/12/17 198 lb (89.8 kg)              Today, you had the following     No orders found for display         Today's Medication Changes          These changes are accurate as of: 8/15/17 10:56 AM.  If you have any questions, ask your nurse or doctor.               These medicines have changed or have updated prescriptions.        Dose/Directions    DULoxetine 30 MG EC capsule   Commonly known as:  CYMBALTA   This may have changed:  See the new instructions.   Used for:  Chronic pain syndrome   Changed by:  Jelly Mcarthur DO        TAKE 1 CAPSULE BY MOUTH EVERY DAY   Quantity:  90 capsule   Refills:  3       hydrOXYzine 25 MG tablet   Commonly known as:  ATARAX   This may have changed:  reasons to take this   Used for:  Fibromyalgia   Changed by:  Jelly Mcarthur DO        Dose:  25-50 mg   Take 1-2 tablets (25-50 mg) by mouth every 6 hours as needed for anxiety   Quantity:  60 tablet   Refills:  3            Where to get your medicines      These medications were sent to [facility] [address] [place], [place] - [address] AT [facility] [address]  [address], [facility] [address]     Phone:  [phone]     DULoxetine 30 MG EC capsule    hydrOXYzine 25 MG tablet                Primary Care Provider Office Phone # Fax #    Jelly Mcarthur [phone] [address] [facility] [address] [facility] [address]  [facility] [address]        Equal Access to " Services     [facility]: Hadii fernando jackson aldojimmy Tutuali, waaxda luqadaha, qaybta kaalmada mary ellen, felicity paredes . So [facility] [phone].    ATENCIÓN: Si santos cruz, tiene a alejo disposición servicios gratuitos de asistencia lingüística. Llame al [phone].    We comply with applicable federal civil rights laws and [place] laws. We do not discriminate on the basis of race, color, national origin, age, disability sex, sexual orientation or gender identity.            Thank you!     Thank you for choosing [facility]  for your care. Our goal is always to provide you with excellent care. Hearing back from our patients is one way we can continue to improve our services. Please take a few minutes to complete the written survey that you may receive in the mail after your visit with us. Thank you!             Your Updated Medication List - Protect others around you: Learn how to safely use, store and throw away your medicines at www.disposemymeds.org.          This list is accurate as of: 8/15/17 10:56 AM.  Always use your most recent med list.                   Brand Name Dispense Instructions for use Diagnosis    Butalbital-APAP-Caffeine -40 MG Caps     30 capsule    Take one capsule as needed for migraine. Max one capsule daily. Do not take with opiate pain medications including oxycodone.    Migraine without status migrainosus, not intractable, unspecified migraine type       diazepam 5 MG tablet    VALIUM    30 tablet    Take 1 tablet (5 mg) by mouth every 6 hours as needed for anxiety, sleep or muscle spasms If you take this medication, do not take any other muscle relaxants.    Fibromyalgia       DULoxetine 30 MG EC capsule    CYMBALTA    90 capsule    TAKE 1 CAPSULE BY MOUTH EVERY DAY    Chronic pain syndrome       hydrOXYzine 25 MG tablet    ATARAX    60 tablet    Take 1-2 tablets (25-50 mg) by mouth every 6 hours as needed for anxiety    Fibromyalgia        metaxalone 800 MG tablet    SKELAXIN    270 tablet    Take 0.5-1 tablets (400-800 mg) by mouth 3 times daily as needed for moderate pain    Muscle spasm       naproxen 375 MG tablet    NAPROSYN    60 tablet    Take 1 tablet (375 mg) by mouth 2 times daily as needed for moderate pain    Myofacial muscle pain       omeprazole 20 MG tablet     30 tablet    Take 1 tablet (20 mg) by mouth daily as needed    Nausea       ondansetron 4 MG ODT tab    ZOFRAN-ODT    30 tablet    Take 1 tablet (4 mg) by mouth daily as needed for nausea    Nausea       senna-docusate 8.6-50 MG per tablet    KATIANA-COLACE    90 tablet    Take 1-2 tablets by mouth 2 times daily as needed for constipation    Constipation, unspecified constipation type       UNABLE TO FIND      Take 10 drops by mouth 2 times daily MEDICATION NAME: CBD Max Hemp Oil

## 2017-08-15 NOTE — PATIENT INSTRUCTIONS
Disability forms filled out today   -We will send in the paperwork   Continue with great exercise   Consider starting a journal re: mood  -We can address your mood at a future visit

## 2017-08-23 ENCOUNTER — MYC MEDICAL ADVICE (OUTPATIENT)
Dept: FAMILY MEDICINE | Facility: CLINIC | Age: 47
End: 2017-08-23

## 2017-08-24 NOTE — TELEPHONE ENCOUNTER
Copies made (1 to [facility] and 1 accordion) & mailed in  pre-addressed envelope to [employer] in [place], [place]

## 2017-08-24 NOTE — TELEPHONE ENCOUNTER
Disability forms completed at office visit and placed with office visit note into the outgoing forms box    Please make copy of disability forms for our records and then mail to address noted    Thanks!

## 2017-09-01 ENCOUNTER — THERAPY VISIT (OUTPATIENT)
Dept: OCCUPATIONAL THERAPY | Facility: CLINIC | Age: 47
End: 2017-09-01
Payer: COMMERCIAL

## 2017-09-01 ENCOUNTER — OFFICE VISIT (OUTPATIENT)
Dept: FAMILY MEDICINE | Facility: CLINIC | Age: 47
End: 2017-09-01
Payer: COMMERCIAL

## 2017-09-01 VITALS
HEART RATE: 80 BPM | BODY MASS INDEX: 34.02 KG/M2 | TEMPERATURE: 97.9 F | WEIGHT: 192 LBS | OXYGEN SATURATION: 98 % | SYSTOLIC BLOOD PRESSURE: 140 MMHG | HEIGHT: 63 IN | DIASTOLIC BLOOD PRESSURE: 96 MMHG

## 2017-09-01 DIAGNOSIS — B95.8 STAPH SKIN INFECTION: ICD-10-CM

## 2017-09-01 DIAGNOSIS — L23.1 CONTACT DERMATITIS DUE TO ADHESIVE BANDAGE: Primary | ICD-10-CM

## 2017-09-01 DIAGNOSIS — G56.03 CARPAL TUNNEL SYNDROME, BILATERAL: ICD-10-CM

## 2017-09-01 DIAGNOSIS — L08.9 STAPH SKIN INFECTION: ICD-10-CM

## 2017-09-01 PROCEDURE — 97140 MANUAL THERAPY 1/> REGIONS: CPT | Mod: GO | Performed by: OCCUPATIONAL THERAPIST

## 2017-09-01 PROCEDURE — 97112 NEUROMUSCULAR REEDUCATION: CPT | Mod: GO | Performed by: OCCUPATIONAL THERAPIST

## 2017-09-01 PROCEDURE — 97110 THERAPEUTIC EXERCISES: CPT | Mod: GO | Performed by: OCCUPATIONAL THERAPIST

## 2017-09-01 PROCEDURE — 97026 INFRARED THERAPY: CPT | Mod: GO | Performed by: OCCUPATIONAL THERAPIST

## 2017-09-01 PROCEDURE — 99213 OFFICE O/P EST LOW 20 MIN: CPT | Performed by: NURSE PRACTITIONER

## 2017-09-01 RX ORDER — PREDNISONE 20 MG/1
TABLET ORAL
Qty: 12 TABLET | Refills: 0 | Status: SHIPPED | OUTPATIENT
Start: 2017-09-01 | End: 2018-04-30

## 2017-09-01 RX ORDER — MUPIROCIN 20 MG/G
OINTMENT TOPICAL 3 TIMES DAILY
Qty: 22 G | Refills: 0 | Status: SHIPPED | OUTPATIENT
Start: 2017-09-01 | End: 2017-09-08

## 2017-09-01 NOTE — PROGRESS NOTES
SUBJECTIVE:   Maegan Lira is a 46 year old female who presents to clinic today for the following health issues:      Chief Complaint   Patient presents with     Derm Problem     got bit by her dogs and put a band aid on her arm and has had a allergic reaction to the adhesive    Maegan was bitten by one of her vaccinated yorkies 6 days ago.  She has had no fever or chills , no enlarged lymph glands or red streaks.  However she has had a violent red rash in the shape of the bandaid that she covered the puncture wound with .  And in just the last 2 days has developed a tiny blistering rash right where the bandaid rash is .         Problem list and histories reviewed & adjusted, as indicated.  Additional history: as documented    Patient Active Problem List   Diagnosis     Low back pain     Migraine     Fibromyalgia     Colon polyps     Weight gain     Cervical disc disorder with radiculopathy     Depression     History of adenomatous polyp of colon     Irritable bowel syndrome     Lumbar disc herniation with radiculopathy     Displacement of lumbar intervertebral disc without myelopathy     Recurrent sinusitis     Atopic rhinitis     Chronic pain syndrome     Bilateral foot pain     Non morbid obesity     Elevated antinuclear antibody (ERIKA) level     Visual disturbance     Tarsal tunnel syndrome of both lower extremities     Melasma     Sjogren's syndrome (H)     Carpal tunnel syndrome, bilateral     Past Surgical History:   Procedure Laterality Date     GYN SURGERY      c section x2     ORTHOPEDIC SURGERY      rt foot     TONSILLECTOMY         Social History   Substance Use Topics     Smoking status: Never Smoker     Smokeless tobacco: Never Used     Alcohol use 0.0 oz/week     0 Standard drinks or equivalent per week      Comment: occas     Family History   Problem Relation Age of Onset     Family History Negative Mother      Cervical Cancer Mother      Anesthesia Reaction Mother      Eye Surgery Mother       Arthritis Mother      Genetic Disorder Mother      Asthma Father      Obesity Father      Aneurysm Maternal Grandmother      Hypertension Maternal Grandmother      OSTEOPOROSIS Maternal Grandmother      Colon Cancer Maternal Grandfather      Prostate Cancer Maternal Grandfather      OSTEOPOROSIS Paternal Grandmother      Obesity Paternal Grandmother      Obesity Paternal Grandfather      Coronary Artery Disease Brother      Hyperlipidemia Brother      Depression Brother      Anxiety Disorder Brother      MENTAL ILLNESS Brother      Substance Abuse Brother      Coronary Artery Disease Maternal Aunt      Hyperlipidemia Maternal Aunt      DIABETES Paternal Aunt      Hypertension Paternal Aunt      KIDNEY DISEASE Paternal Aunt      Obesity Paternal Aunt      Obesity Daughter          Current Outpatient Prescriptions   Medication Sig Dispense Refill     predniSONE (DELTASONE) 20 MG tablet Take 2 tabs (40 mg) daily x 3 days, 1 tab (20 mg) daily x 3 days, then 1/2 tab (10 mg) x 3 days. 12 tablet 0     mupirocin (BACTROBAN) 2 % ointment Apply topically 3 times daily for 7 days 22 g 0     DULoxetine (CYMBALTA) 30 MG EC capsule TAKE 1 CAPSULE BY MOUTH EVERY DAY 90 capsule 3     hydrOXYzine (ATARAX) 25 MG tablet Take 1-2 tablets (25-50 mg) by mouth every 6 hours as needed for anxiety 60 tablet 3     diazepam (VALIUM) 5 MG tablet Take 1 tablet (5 mg) by mouth every 6 hours as needed for anxiety, sleep or muscle spasms If you take this medication, do not take any other muscle relaxants. 30 tablet 0     omeprazole 20 MG tablet Take 1 tablet (20 mg) by mouth daily as needed 30 tablet 3     ondansetron (ZOFRAN-ODT) 4 MG ODT tab Take 1 tablet (4 mg) by mouth daily as needed for nausea 30 tablet 3     UNABLE TO FIND Take 10 drops by mouth 2 times daily MEDICATION NAME: CBD Max Hemp Oil       senna-docusate (KATIANA-COLACE) 8.6-50 MG per tablet Take 1-2 tablets by mouth 2 times daily as needed for constipation 90 tablet 3      "Butalbital-APAP-Caffeine -40 MG CAPS Take one capsule as needed for migraine. Max one capsule daily. Do not take with opiate pain medications including oxycodone. 30 capsule 1     naproxen (NAPROSYN) 375 MG tablet Take 1 tablet (375 mg) by mouth 2 times daily as needed for moderate pain 60 tablet 3     Allergies   Allergen Reactions     Ampicillin      Codeine      Keflex [Cephalexin] Rash     Latex Rash         Reviewed and updated as needed this visit by clinical staffTobacco  Meds  Soc Hx      Reviewed and updated as needed this visit by Provider         ROS:  Constitutional, HEENT, cardiovascular, pulmonary, gi and gu systems are negative, except as otherwise noted.      OBJECTIVE:   BP (!) 140/96  Pulse 80  Temp 97.9  F (36.6  C)  Ht 5' 3\" (1.6 m)  Wt 192 lb (87.1 kg)  LMP 08/04/2017 (Exact Date)  SpO2 98%  Breastfeeding? No  BMI 34.01 kg/m2  Body mass index is 34.01 kg/(m^2).  GENERAL: healthy, alert and mild distress  MS: swollen mildly tender at site of bite but no associated erythema or fluctuance or purulence,  SKIN: in the outline of the bandaid is an erythematous rash with small blistering pustules covering that, no angiitis  PSYCH: mentation appears normal, affect normal/bright  LYMPH: no cervical, supraclavicular, axillary, or inguinal adenopathy    Diagnostic Test Results:  none     ASSESSMENT/PLAN:         ICD-10-CM    1. Contact dermatitis due to adhesive bandage L25.8 predniSONE (DELTASONE) 20 MG tablet     mupirocin (BACTROBAN) 2 % ointment   2. Staph skin infection L08.9     B95.8    If infection is not clearing over the next 2 days she is to call on call MD for oral antibiotic       TONI Montero [facility]    "

## 2017-09-01 NOTE — MR AVS SNAPSHOT
After Visit Summary   9/1/2017    Maegan Lira    MRN: [id number]           Patient Information     Date Of Birth          1970        Visit Information        Provider Department      9/1/2017 10:30 AM [facility]        Today's Diagnoses     Carpal tunnel syndrome, bilateral           Follow-ups after your visit        Your next 10 appointments already scheduled     Sep 01, 2017 12:30 PM CDT   Office Visit with TONI Montero [facility] ([facility])    [address] [place] [id number] [phone]           Bring a current list of meds and any records pertaining to this visit. For Physicals, please bring immunization records and any forms needing to be filled out. Please arrive 10 minutes early to complete paperwork.            Sep 14, 2017 12:00 PM CDT   CINDY Hand with Felicia [facility] [place] ([facility])    [address] [id number] [phone]            Sep 21, 2017 12:00 PM CDT   [facility]    [address] [id number] [phone]              Who to contact     If you have questions or need follow up information about today's clinic visit or your schedule please contact [facility] directly at [phone].  Normal or non-critical lab and imaging results will be communicated to you by MyChart, letter or phone within 4 business days after the clinic has received the results. If you do not hear from us within 7 days, please contact the clinic through MyChart or phone. If you have a critical or abnormal lab result, we will notify you by phone as soon as possible.  Submit refill requests through [employer] or call your pharmacy and they will forward the refill request to us. Please allow 3 business days for your refill to be completed.          Additional Information  About Your Visit        [employer] Information     [employer] gives you secure access to your electronic health record. If you see a primary care provider, you can also send messages to your care team and make appointments. If you have questions, please call your primary care clinic.  If you do not have a primary care provider, please call [phone] and they will assist you.        Care EveryWhere ID     This is your Care EveryWhere ID. This could be used by other organizations to access your [facility] medical records  [id number]        Your Vitals Were     Last Period                   08/04/2017 (Exact Date)            Blood Pressure from Last 3 Encounters:   08/15/17 119/79   07/15/17 (!) 140/93   05/24/17 110/72    Weight from Last 3 Encounters:   08/15/17 87.1 kg (192 lb)   07/15/17 88.5 kg (195 lb)   05/12/17 89.8 kg (198 lb)              We Performed the Following     CINDY PROGRESS NOTES REPORT     INFRARED THERAPY     MANUAL THER TECH,1+REGIONS,EA 15 MIN     NEUROMUSCULAR RE-EDUCATION     THERAPEUTIC EXERCISES        Primary Care Provider Office Phone # Fax #    Jelly Mcarthur,  [phone] [address] [facility] [address]  [facility] [address]        Equal Access to Services     KENYON WILKS : Hadii aad ku hadasho Soomaali, waaxda luqadaha, qaybta kaalmada adeegyada, felicity paredes . So [facility] [phone].    ATENCIÓN: Si habla español, tiene a alejo disposición servicios gratuitos de asistencia lingüística. Llame al [phone].    We comply with applicable federal civil rights laws and [place] laws. We do not discriminate on the basis of race, color, national origin, age, disability sex, sexual orientation or gender identity.            Thank you!     Thank you for choosing [facility]  for your care. Our goal is always to provide you with excellent care. Hearing back from our patients is one way we can continue to improve our services. Please take a few minutes to  complete the written survey that you may receive in the mail after your visit with us. Thank you!             Your Updated Medication List - Protect others around you: Learn how to safely use, store and throw away your medicines at www.disposemymeds.org.          This list is accurate as of: 9/1/17 11:53 AM.  Always use your most recent med list.                   Brand Name Dispense Instructions for use Diagnosis    Butalbital-APAP-Caffeine -40 MG Caps     30 capsule    Take one capsule as needed for migraine. Max one capsule daily. Do not take with opiate pain medications including oxycodone.    Migraine without status migrainosus, not intractable, unspecified migraine type       diazepam 5 MG tablet    VALIUM    30 tablet    Take 1 tablet (5 mg) by mouth every 6 hours as needed for anxiety, sleep or muscle spasms If you take this medication, do not take any other muscle relaxants.    Fibromyalgia       DULoxetine 30 MG EC capsule    CYMBALTA    90 capsule    TAKE 1 CAPSULE BY MOUTH EVERY DAY    Chronic pain syndrome       hydrOXYzine 25 MG tablet    ATARAX    60 tablet    Take 1-2 tablets (25-50 mg) by mouth every 6 hours as needed for anxiety    Fibromyalgia       metaxalone 800 MG tablet    SKELAXIN    270 tablet    Take 0.5-1 tablets (400-800 mg) by mouth 3 times daily as needed for moderate pain    Muscle spasm       naproxen 375 MG tablet    NAPROSYN    60 tablet    Take 1 tablet (375 mg) by mouth 2 times daily as needed for moderate pain    Myofacial muscle pain       omeprazole 20 MG tablet     30 tablet    Take 1 tablet (20 mg) by mouth daily as needed    Nausea       ondansetron 4 MG ODT tab    ZOFRAN-ODT    30 tablet    Take 1 tablet (4 mg) by mouth daily as needed for nausea    Nausea       senna-docusate 8.6-50 MG per tablet    KATIANA-COLACE    90 tablet    Take 1-2 tablets by mouth 2 times daily as needed for constipation    Constipation, unspecified constipation type       UNABLE TO FIND       Take 10 drops by mouth 2 times daily MEDICATION NAME: CBD Max Hemp Oil

## 2017-09-01 NOTE — PROGRESS NOTES
Progress Note - Hand Therapy    Current Date:  9/1/2017    Diagnosis:  Bilateral hand pain/weakness  DOI:  October 2015    Number of visits to date:  4    Reporting period is 6/29/2017 to 9/1/2017    Subjectve:   Subjective changes as noted by patient:   Doing better.  Less tingling.  Tingling when I hold my phone in bed.  Functional changes noted by patient:  Improvement in Self Care Tasks (dressing, eating, bathing, hygiene/toileting), Work Tasks, Sleep Patterns, Recreational Activities, Household Chores and Driving   Patient has noted adverse reaction to:  None        Objective:  Pain Level Report  VAS(0-10) 6/1/2017 6/8/2017 6/15/2017 6/20/2017 6/29/2017 9/1/2017     At Rest: 7 tingling  R: 6  L: 6 7 tingling  R: 6  L: 6 7 tingling  R: 6  L: 6 7 tingling  R: 6  L: 6 6-7 tingling  R: 5  L: 5 5 tingling  R: 3  L: 3     With Use: R: 8  L: 8 R: 8  L: 8 R: 8  L: 8 R: 8  L: 8 R: 8  L: 8 R: 6-7  L: 6-7         Report of Pain:  Location:  All fingers  Description:  pain  Frequency:  constant    Duration:  lingers  Exacerbated by:  use  Relieved by:  meds (somewhat)  Progression: improving    Special Tests:   Date 6/1/2017 6/29/2017 9/1/2017      Left Right Left Right Left Right       Tinels CT             Tinels PIN             Tinels DSRN             Tinels cubital tunnel             Tinels [place]             Phalen's   + + - + - -       Elbow Flexion Test   + + - - - -           ULTT ( % of full glide )  Date 6/1/2017 6/8/17 6/20/2017 6/29/2017 9/1/2017    Median nerve                 Ulnar nerve    Radial nerve  R:  + at 90 degrees arm abd and wrist extended  L:  + at 60 with wrist extended  R:  + at 90 degrees arm abd and wrist extended  L:  + at 60 with wrist extended R:  + at 60 degrees arm abd and wrist straight  L:  + at 60 with wrist extended R:  + at 45 degrees arm abd and wrist straight  L:  + at 45 with wrist extended R:  + at 80 degrees arm abd and wrist straight  L:  + at 80 with wrist extended       Cervical Screen: (+ or -)  Date 2017 12/15/2016      Active Comments Passive Comments     Flexion - -      Extension -       Lateral Flexion  Right -      Lateral Flexion  Left -      Rotation Right heaviness      Rotation Left heaviness      Compression Neck -        TOS Special Tests  Date 2017       Left Right Left Right Left Right Left Right Left Right Left Right   Inderjit Test + + + + - + small         Costoclavicular Test + + + + + after45 sec + after 15 sec small and ring         Rae's Test               Adson's Test                   Posture:  []              Normal   []             Forward Neck Posture  [x]             Rounded Forward Shoulders  []             Shoulder Height Difference  Comments:    Patient's proximal musculature at shoulder appears imbalanced with tight pectoralis muscles, subscapularis, upper trapezius, lattisimus and weak scapular stabilizers.  This pattern contributes to overuse of distal musculature.    Edema:  [x]             None  []              Mild   []             Moderate  []              Severe     []              of affected part      Sensation: []              WNL throughout all nerve distributions; per patient report    [x]              Decreased  [x]              Median    [x]             Ulnar    []             Radial nerve distribution    []              See Sensory Evaluation    Description:    STRENGTH:   (Measured in pounds)     2017    Trials Right Left Right Left Right Left  Right Left   1  2  3  Av#-       65#-       65#       68#       70#       71#       3 pt Pinch 2017      Trials Right Left Right Left Right Left  Right Left   1  2  3  Av#-       17#-           Lateral Pinch 2017          Trials Right Left Right Left Right Left  Right Left   1  2  3  Av#-       21#-               Assessment:  Response to therapy has been improvement to:  Flexibility:  less  tightness in involved muscles  Pain:  intensity of pain is decreased   strength improved.  Paresthesias:  Improved.    Overall Assessment:  Patient's symptoms are resolving.  STG/LTG:  STGoals have been reviewed and some progress or achievement has occurred;  see goal sheet for details and updates.    Plan:  Frequency/Duration:  Recommend continuing to see patient  2 X /month, once daily  for 3 months  Appropriateness of Rx I have re-evaluated this patient and find that the nature, scope, duration and intensity of the therapy is appropriate for the medical condition of the patient.    Recommendations for Continued Therapy      Home Exercise Program:  Foam roller massage and stretch pecs  Icing prn  Proximal median nerve glide  Massage fa and upper arm (bicep) (partner to use roller bar to massage)  Clock pec stretch  Latissimus stretch  FM LEP (bilateral)  Extensor stretches  Table top flexor stretch  tricep stretch  Table top flexor stretch  Gladstone massage  Epsom salts  Can massage tricep  Core strengthening and Lower trap squeezes with nerve gliding  Trigger massage to outer elbow with flexion / extension of elbow  Neck lateral bend with median nerve glide  Use long dowel to push into tight pecs  Back extension exercises over therapy ball  Bullet prosource foam roller for deeper massage    Next Visit:  Laser  Mfr  Stretching  Nerve gliding

## 2017-09-01 NOTE — MR AVS SNAPSHOT
After Visit Summary   9/1/2017    Maegan Lira    MRN: [id number]           Patient Information     Date Of Birth          1970        Visit Information        Provider Department      9/1/2017 12:30 PM Tereza Arguelles APRN CNP [facility]        Today's Diagnoses     Contact dermatitis due to adhesive bandage    -  1    Staph skin infection           Follow-ups after your visit        Follow-up notes from your care team     Return if symptoms worsen or fail to improve.      Your next 10 appointments already scheduled     Sep 14, 2017 12:00 PM CDT   CINDY Hand with Felicia Summers   [facility]    [address] [id number] [phone]            Sep 21, 2017 12:00 PM CDT   [facility]    [address] [id number] [phone]              Who to contact     If you have questions or need follow up information about today's clinic visit or your schedule please contact [facility] directly at [phone].  Normal or non-critical lab and imaging results will be communicated to you by MyChart, letter or phone within 4 business days after the clinic has received the results. If you do not hear from us within 7 days, please contact the clinic through K[employer] or phone. If you have a critical or abnormal lab result, we will notify you by phone as soon as possible.  Submit refill requests through Et[employer]ar[employer] or call your pharmacy and they will forward the refill request to us. Please allow 3 business days for your refill to be completed.          Additional Information About Your Visit        MyChart Information     Et[employer]raf gives you secure access to your electronic health record. If you see a primary care provider, you can also send messages to your care team and make appointments. If you have questions, please call your primary  "care clinic.  If you do not have a primary care provider, please call [phone] and they will assist you.        Care EveryWhere ID     This is your Care EveryWhere ID. This could be used by other organizations to access your [place] medical records  [id number]        Your Vitals Were     Pulse Temperature Height Last Period Pulse Oximetry Breastfeeding?    80 97.9  F (36.6  C) 5' 3\" (1.6 m) 08/04/2017 (Exact Date) 98% No    BMI (Body Mass Index)                   34.01 kg/m2            Blood Pressure from Last 3 Encounters:   09/01/17 (!) 140/96   08/15/17 119/79   07/15/17 (!) 140/93    Weight from Last 3 Encounters:   09/01/17 192 lb (87.1 kg)   08/15/17 192 lb (87.1 kg)   07/15/17 195 lb (88.5 kg)              Today, you had the following     No orders found for display         Today's Medication Changes          These changes are accurate as of: 9/1/17  2:27 PM.  If you have any questions, ask your nurse or doctor.               Start taking these medicines.        Dose/Directions    mupirocin 2 % ointment   Commonly known as:  BACTROBAN   Used for:  Contact dermatitis due to adhesive bandage   Started by:  Tereza Arguelles APRN CNP        Apply topically 3 times daily for 7 days   Quantity:  22 g   Refills:  0       predniSONE 20 MG tablet   Commonly known as:  DELTASONE   Used for:  Contact dermatitis due to adhesive bandage   Started by:  Tereza Arguelles APRN CNP        Take 2 tabs (40 mg) daily x 3 days, 1 tab (20 mg) daily x 3 days, then 1/2 tab (10 mg) x 3 days.   Quantity:  12 tablet   Refills:  0         Stop taking these medicines if you haven't already. Please contact your care team if you have questions.     metaxalone 800 MG tablet   Commonly known as:  SKELAXIN   Stopped by:  Tereza Arguelles APRN CNP                Where to get your medicines      These medications were sent to [facility], [place] - [address]  [address], [address], [facility] [address]     Phone:  [phone]     mupirocin 2 % ointment    predniSONE 20 MG tablet                Primary Care Provider Office Phone # Fax #    Jelly Mcarthur [phone] [address]  [facility] [address]        Equal Access to Services     MICHAEL [facility] Center[facility]INA : Hadii aad ku hadasho Soomaali, waaxda luqadaha, qaybta kaalmada adeegyada, waxay idiin hayaan adeeg kharash la'aan . So [facility] [phone].    ATENCIÓN: Si habla español, tiene a alejo disposición servicios gratuitos de asistencia lingüística. Llame al [phone].    We comply with applicable federal civil rights laws and [place] laws. We do not discriminate on the basis of race, color, national origin, age, disability sex, sexual orientation or gender identity.            Thank you!     Thank you for choosing [facility]  for your care. Our goal is always to provide you with excellent care. Hearing back from our patients is one way we can continue to improve our services. Please take a few minutes to complete the written survey that you may receive in the mail after your visit with us. Thank you!             Your Updated Medication List - Protect others around you: Learn how to safely use, store and throw away your medicines at www.disposemymeds.org.          This list is accurate as of: 9/1/17  2:27 PM.  Always use your most recent med list.                   Brand Name Dispense Instructions for use Diagnosis    Butalbital-APAP-Caffeine -40 MG Caps     30 capsule    Take one capsule as needed for migraine. Max one capsule daily. Do not take with opiate pain medications including oxycodone.    Migraine without status migrainosus, not intractable, unspecified migraine type       diazepam 5 MG tablet    VALIUM    30 tablet    Take 1 tablet (5 mg) by mouth every 6 hours as needed for anxiety, sleep or muscle spasms If you take this medication, do not take any other muscle relaxants.    Fibromyalgia        DULoxetine 30 MG EC capsule    CYMBALTA    90 capsule    TAKE 1 CAPSULE BY MOUTH EVERY DAY    Chronic pain syndrome       hydrOXYzine 25 MG tablet    ATARAX    60 tablet    Take 1-2 tablets (25-50 mg) by mouth every 6 hours as needed for anxiety    Fibromyalgia       mupirocin 2 % ointment    BACTROBAN    22 g    Apply topically 3 times daily for 7 days    Contact dermatitis due to adhesive bandage       naproxen 375 MG tablet    NAPROSYN    60 tablet    Take 1 tablet (375 mg) by mouth 2 times daily as needed for moderate pain    Myofacial muscle pain       omeprazole 20 MG tablet     30 tablet    Take 1 tablet (20 mg) by mouth daily as needed    Nausea       ondansetron 4 MG ODT tab    ZOFRAN-ODT    30 tablet    Take 1 tablet (4 mg) by mouth daily as needed for nausea    Nausea       predniSONE 20 MG tablet    DELTASONE    12 tablet    Take 2 tabs (40 mg) daily x 3 days, 1 tab (20 mg) daily x 3 days, then 1/2 tab (10 mg) x 3 days.    Contact dermatitis due to adhesive bandage       senna-docusate 8.6-50 MG per tablet    KATIANA-COLACE    90 tablet    Take 1-2 tablets by mouth 2 times daily as needed for constipation    Constipation, unspecified constipation type       UNABLE TO FIND      Take 10 drops by mouth 2 times daily MEDICATION NAME: CBD Max Hemp Oil

## 2017-09-14 ENCOUNTER — THERAPY VISIT (OUTPATIENT)
Dept: OCCUPATIONAL THERAPY | Facility: CLINIC | Age: 47
End: 2017-09-14
Payer: COMMERCIAL

## 2017-09-14 DIAGNOSIS — G56.03 CARPAL TUNNEL SYNDROME, BILATERAL: ICD-10-CM

## 2017-09-14 PROCEDURE — 97140 MANUAL THERAPY 1/> REGIONS: CPT | Mod: GO | Performed by: OCCUPATIONAL THERAPIST

## 2017-09-14 PROCEDURE — 97110 THERAPEUTIC EXERCISES: CPT | Mod: GO | Performed by: OCCUPATIONAL THERAPIST

## 2017-09-14 PROCEDURE — 97026 INFRARED THERAPY: CPT | Mod: GO | Performed by: OCCUPATIONAL THERAPIST

## 2017-09-14 PROCEDURE — 97112 NEUROMUSCULAR REEDUCATION: CPT | Mod: GO | Performed by: OCCUPATIONAL THERAPIST

## 2017-09-14 NOTE — PROGRESS NOTES
SOAP note objective information for 9/14/2017.    Please refer to the daily flowsheet for treatment today, total treatment time and time spent performing 1:1 timed codes.       Objective:  Pain Level Report  VAS(0-10) 6/1/2017 6/8/2017 6/15/2017 6/20/2017 6/29/2017 9/1/2017 9/14/2017    At Rest: 7 tingling  R: 6  L: 6 7 tingling  R: 6  L: 6 7 tingling  R: 6  L: 6 7 tingling  R: 6  L: 6 6-7 tingling  R: 5  L: 5 5 tingling  R: 3  L: 3 4 tingling  R: 3  L: 3    With Use: R: 8  L: 8 R: 8  L: 8 R: 8  L: 8 R: 8  L: 8 R: 8  L: 8 R: 6-7  L: 6-7 R: 6  L: 6        Report of Pain:  Location:  All fingers  Description:  pain  Frequency:  constant    Duration:  lingers  Exacerbated by:  use  Relieved by:  meds (somewhat)  Progression: improving    Special Tests:   Date 6/1/2017 6/29/2017 9/1/2017      Left Right Left Right Left Right       Tinels CT             Tinels PIN             Tinels DSRN             Tinels cubital tunnel             Tinels Guyons Canal             Phalen's   + + - + - -       Elbow Flexion Test   + + - - - -           ULTT ( % of full glide )  Date 6/1/2017 6/8/17 6/20/2017 6/29/2017 9/1/2017 9/14/2017   Median nerve                 Ulnar nerve    Radial nerve  R:  + at 90 degrees arm abd and wrist extended  L:  + at 60 with wrist extended  R:  + at 90 degrees arm abd and wrist extended  L:  + at 60 with wrist extended R:  + at 60 degrees arm abd and wrist straight  L:  + at 60 with wrist extended R:  + at 45 degrees arm abd and wrist straight  L:  + at 45 with wrist extended R:  + at 80 degrees arm abd and wrist straight  L:  + at 80 with wrist extended R:  + at 80 degrees arm abd and wrist straight  L:  + at 60 with wrist extended     Cervical Screen: (+ or -)  Date 6/1/2017 12/15/2016      Active Comments Passive Comments     Flexion - -      Extension -       Lateral Flexion  Right -      Lateral Flexion  Left -      Rotation Right heaviness      Rotation Left heaviness      Compression Neck -         TOS Special Tests  Date 2017       Left Right Left Right Left Right Left Right Left Right Left Right   Inderjit Test + + + + - + small         Costoclavicular Test + + + + + after45 sec + after 15 sec small and ring         Rae's Test               Adson's Test                   Posture:  []              Normal   []             Forward Neck Posture  [x]             Rounded Forward Shoulders  []             Shoulder Height Difference  Comments:    Patient's proximal musculature at shoulder appears imbalanced with tight pectoralis muscles, subscapularis, upper trapezius, lattisimus and weak scapular stabilizers.  This pattern contributes to overuse of distal musculature.    Edema:  [x]             None  []              Mild   []             Moderate  []              Severe     []              of affected part      Sensation: []              WNL throughout all nerve distributions; per patient report    [x]              Decreased  [x]              Median    [x]             Ulnar    []             Radial nerve distribution    []              See Sensory Evaluation    Description:    STRENGTH:   (Measured in pounds)     2017    Trials Right Left Right Left Right Left  Right Left   1  2  3  Av#-       65#-       65#       68#       70#       71#       3 pt Pinch 2017      Trials Right Left Right Left Right Left  Right Left   1  2  3  Av#-       17#-           Lateral Pinch 2017          Trials Right Left Right Left Right Left  Right Left   1  2  3  Av#-       21#-               Home Exercise Program:  Foam roller massage and stretch pecs  Icing prn  Proximal median nerve glide  Massage fa and upper arm (bicep) (partner to use roller bar to massage)  Clock pec stretch  Latissimus stretch  FM LEP (bilateral)  Extensor stretches  Table top flexor stretch  tricep stretch  Table top flexor stretch  [place] massage  Epsom salts  Can  massage tricep  Core strengthening and Lower trap squeezes with nerve gliding  Trigger massage to outer elbow with flexion / extension of elbow  Neck lateral bend with median nerve glide  Use long dowel to push into tight pecs  Back extension exercises over therapy ball  Bullet prosource foam roller for deeper massage    Next Visit:  Laser  Mfr  Stretching  Nerve gliding

## 2017-09-14 NOTE — MR AVS SNAPSHOT
After Visit Summary   9/14/2017    Maegan Lira    MRN: [id number]           Patient Information     Date Of Birth          1970        Visit Information        Provider Department      9/14/2017 12:00 PM Felicia Summers [facility]        Today's Diagnoses     Carpal tunnel syndrome, bilateral           Follow-ups after your visit        Your next 10 appointments already scheduled     Sep 21, 2017 12:00 PM CDT   CINDY Hand with Felicia Melina   [facility] ([facility])    [address] [place] [id number] [phone]              Who to contact     If you have questions or need follow up information about today's clinic visit or your schedule please contact [facility] directly at [phone].  Normal or non-critical lab and imaging results will be communicated to you by MyChart, letter or phone within 4 business days after the clinic has received the results. If you do not hear from us within 7 days, please contact the clinic through MyChart or phone. If you have a critical or abnormal lab result, we will notify you by phone as soon as possible.  Submit refill requests through [employer] or call your pharmacy and they will forward the refill request to us. Please allow 3 business days for your refill to be completed.          Additional Information About Your Visit        MyChart Information     [employer] gives you secure access to your electronic health record. If you see a primary care provider, you can also send messages to your care team and make appointments. If you have questions, please call your primary care clinic.  If you do not have a primary care provider, please call [phone] and they will assist you.        Care EveryWhere ID     This is your Care EveryWhere ID. This could be used by other organizations to access your [place] medical records  [id number]        Your Vitals Were     Last Period                   08/04/2017  (Exact Date)            Blood Pressure from Last 3 Encounters:   09/01/17 (!) 140/96   08/15/17 119/79   07/15/17 (!) 140/93    Weight from Last 3 Encounters:   09/01/17 87.1 kg (192 lb)   08/15/17 87.1 kg (192 lb)   07/15/17 88.5 kg (195 lb)              We Performed the Following     INFRARED THERAPY     MANUAL THER TECH,1+REGIONS,EA 15 MIN     NEUROMUSCULAR RE-EDUCATION     THERAPEUTIC EXERCISES        Primary Care Provider Office Phone # Fax #    Jelly Mcarthur, [phone] [address] [facility] [address]  [facility] [address]        Equal Access to Services     KENYON WILKS : Hadii fernando jackson hadasho Sogardenia, waaxda luqadaha, qaybta kaalmada adeegyada, felicity paredes . So [facility] [phone].    ATENCIÓN: Si habla español, tiene a alejo disposición servicios gratuitos de asistencia lingüística. Llame al [phone].    We comply with applicable federal civil rights laws and [place] laws. We do not discriminate on the basis of race, color, national origin, age, disability sex, sexual orientation or gender identity.            Thank you!     Thank you for choosing [facility]  for your care. Our goal is always to provide you with excellent care. Hearing back from our patients is one way we can continue to improve our services. Please take a few minutes to complete the written survey that you may receive in the mail after your visit with us. Thank you!             Your Updated Medication List - Protect others around you: Learn how to safely use, store and throw away your medicines at www.disposemymeds.org.          This list is accurate as of: 9/14/17 12:10 PM.  Always use your most recent med list.                   Brand Name Dispense Instructions for use Diagnosis    Butalbital-APAP-Caffeine -40 MG Caps     30 capsule    Take one capsule as needed for migraine. Max one capsule daily. Do not take with opiate pain medications including oxycodone.    Migraine without  status migrainosus, not intractable, unspecified migraine type       diazepam 5 MG tablet    VALIUM    30 tablet    Take 1 tablet (5 mg) by mouth every 6 hours as needed for anxiety, sleep or muscle spasms If you take this medication, do not take any other muscle relaxants.    Fibromyalgia       DULoxetine 30 MG EC capsule    CYMBALTA    90 capsule    TAKE 1 CAPSULE BY MOUTH EVERY DAY    Chronic pain syndrome       hydrOXYzine 25 MG tablet    ATARAX    60 tablet    Take 1-2 tablets (25-50 mg) by mouth every 6 hours as needed for anxiety    Fibromyalgia       naproxen 375 MG tablet    NAPROSYN    60 tablet    Take 1 tablet (375 mg) by mouth 2 times daily as needed for moderate pain    Myofacial muscle pain       omeprazole 20 MG tablet     30 tablet    Take 1 tablet (20 mg) by mouth daily as needed    Nausea       ondansetron 4 MG ODT tab    ZOFRAN-ODT    30 tablet    Take 1 tablet (4 mg) by mouth daily as needed for nausea    Nausea       predniSONE 20 MG tablet    DELTASONE    12 tablet    Take 2 tabs (40 mg) daily x 3 days, 1 tab (20 mg) daily x 3 days, then 1/2 tab (10 mg) x 3 days.    Contact dermatitis due to adhesive bandage       senna-docusate 8.6-50 MG per tablet    KATIANA-COLACE    90 tablet    Take 1-2 tablets by mouth 2 times daily as needed for constipation    Constipation, unspecified constipation type       UNABLE TO FIND      Take 10 drops by mouth 2 times daily MEDICATION NAME: CBD Max Hemp Oil

## 2017-09-21 ENCOUNTER — THERAPY VISIT (OUTPATIENT)
Dept: OCCUPATIONAL THERAPY | Facility: CLINIC | Age: 47
End: 2017-09-21
Payer: COMMERCIAL

## 2017-09-21 DIAGNOSIS — G56.03 CARPAL TUNNEL SYNDROME, BILATERAL: ICD-10-CM

## 2017-09-21 PROCEDURE — 97112 NEUROMUSCULAR REEDUCATION: CPT | Mod: GO | Performed by: OCCUPATIONAL THERAPIST

## 2017-09-21 PROCEDURE — 97026 INFRARED THERAPY: CPT | Mod: GO | Performed by: OCCUPATIONAL THERAPIST

## 2017-09-21 PROCEDURE — 97110 THERAPEUTIC EXERCISES: CPT | Mod: GO | Performed by: OCCUPATIONAL THERAPIST

## 2017-09-21 PROCEDURE — 97140 MANUAL THERAPY 1/> REGIONS: CPT | Mod: GO | Performed by: OCCUPATIONAL THERAPIST

## 2017-09-21 NOTE — PROGRESS NOTES
SOAP note objective information for 9/21/2017    Please refer to the daily flowsheet for treatment today, total treatment time and time spent performing 1:1 timed codes.       Objective:  Pain Level Report  VAS(0-10) 6/1/2017 6/8/2017 6/15/2017 6/20/2017 6/29/2017 9/1/2017 9/14/2017 9/21/2017   At Rest: 7 tingling  R: 6  L: 6 7 tingling  R: 6  L: 6 7 tingling  R: 6  L: 6 7 tingling  R: 6  L: 6 6-7 tingling  R: 5  L: 5 5 tingling  R: 3  L: 3 4 tingling  R: 3  L: 3 3 tingling  R: 3  L: 3   With Use: R: 8  L: 8 R: 8  L: 8 R: 8  L: 8 R: 8  L: 8 R: 8  L: 8 R: 6-7  L: 6-7 R: 6  L: 6 R: 5  L: 5       Report of Pain:  Location:  All fingers  Description:  pain  Frequency:  constant    Duration:  lingers  Exacerbated by:  use  Relieved by:  meds (somewhat)  Progression: improving    Special Tests:   Date 6/1/2017 6/29/2017 9/1/2017      Left Right Left Right Left Right       Tinels CT             Tinels PIN             Tinels DSRN             Tinels cubital tunnel             Tinels Guyons Canal             Phalen's   + + - + - -       Elbow Flexion Test   + + - - - -           ULTT ( % of full glide )  Date 6/1/2017 6/8/17 6/20/2017 6/29/2017 9/1/2017 9/14/2017   Median nerve                 Ulnar nerve    Radial nerve  R:  + at 90 degrees arm abd and wrist extended  L:  + at 60 with wrist extended  R:  + at 90 degrees arm abd and wrist extended  L:  + at 60 with wrist extended R:  + at 60 degrees arm abd and wrist straight  L:  + at 60 with wrist extended R:  + at 45 degrees arm abd and wrist straight  L:  + at 45 with wrist extended R:  + at 80 degrees arm abd and wrist straight  L:  + at 80 with wrist extended R:  + at 80 degrees arm abd and wrist straight  L:  + at 60 with wrist extended     Date 9/21/2017        Median nerve                 Ulnar nerve    Radial nerve  R:  + at 90 degrees arm abd and wrist extended  L:  + at 70 with wrist extended             Cervical Screen: (+ or -)  Date 6/1/2017 12/15/2016       Active Comments Passive Comments     Flexion - -      Extension -       Lateral Flexion  Right -      Lateral Flexion  Left -      Rotation Right heaviness      Rotation Left heaviness      Compression Neck -        TOS Special Tests  Date 2017       Left Right Left Right Left Right Left Right Left Right Left Right   Inderjit Test + + + + - + small         Costoclavicular Test + + + + + after45 sec + after 15 sec small and ring         Rae's Test               Adson's Test                   Posture:  []              Normal   []             Forward Neck Posture  [x]             Rounded Forward Shoulders  []             Shoulder Height Difference  Comments:    Patient's proximal musculature at shoulder appears imbalanced with tight pectoralis muscles, subscapularis, upper trapezius, lattisimus and weak scapular stabilizers.  This pattern contributes to overuse of distal musculature.    Edema:  [x]             None  []              Mild   []             Moderate  []              Severe     []              of affected part      Sensation: []              WNL throughout all nerve distributions; per patient report    [x]              Decreased  [x]              Median    [x]             Ulnar    []             Radial nerve distribution    []              See Sensory Evaluation    Description:    STRENGTH:   (Measured in pounds)     2017    Trials Right Left Right Left Right Left  Right Left   1  2  3  Av#-       65#-       65#       68#       70#       71#       3 pt Pinch 2017      Trials Right Left Right Left Right Left  Right Left   1  2  3  Av#-       17#-           Lateral Pinch 2017          Trials Right Left Right Left Right Left  Right Left   1  2  3  Av#-       21#-               Home Exercise Program:  Foam roller massage and stretch pecs  Icing prn  Proximal median nerve glide  Massage fa and upper arm (bicep) (partner  to use roller bar to massage)  Clock pec stretch  Latissimus stretch  FM LEP (bilateral)  Extensor stretches  Table top flexor stretch  tricep stretch  Table top flexor stretch  [place] massage  Epsom salts  Can massage tricep  Core strengthening and Lower trap squeezes with nerve gliding  Trigger massage to outer elbow with flexion / extension of elbow  Neck lateral bend with median nerve glide  Use long dowel to push into tight pecs  Back extension exercises over therapy ball  Bullet prosource foam roller for deeper massage    Next Visit:  Laser  Mfr  Stretching  Nerve gliding

## 2017-09-21 NOTE — MR AVS SNAPSHOT
After Visit Summary   9/21/2017    Maegan Lira    MRN: [id number]           Patient Information     Date Of Birth          1970        Visit Information        Provider Department      9/21/2017 12:00 PM Felicia Summers [facility]        Today's Diagnoses     Carpal tunnel syndrome, bilateral           Follow-ups after your visit        Your next 10 appointments already scheduled     Sep 28, 2017 12:00 PM CDT   [facility]    [address] [address] [phone]            Oct 05, 2017 12:00 PM CDT   CINDY Hand with Felicia Summers   [facility] ([facility])    [address] [facility] [address]  [facility] [address] [phone]            Oct 19, 2017 12:00 PM CDT   CINDY Hand with Felicia Summers   [facility] ([facility])    [address] [facility] [address]  [facility] [address] [phone]              Who to contact     If you have questions or need follow up information about today's clinic visit or your schedule please contact [facility] directly at [phone].  Normal or non-critical lab and imaging results will be communicated to you by [employer], letter or phone within 4 business days after the clinic has received the results. If you do not hear from us within 7 days, please contact the clinic through [employer] or phone. If you have a critical or abnormal lab result, we will notify you by phone as soon as possible.  Submit refill requests through [employer] or call your pharmacy and they will forward the refill request to us. Please allow 3 business days for your refill to be completed.          Additional Information About Your Visit        [employer] Information     [employer] gives you secure access to your electronic health record. If you see a primary care provider, you can also send messages to your care team and make appointments. If you have  questions, please call your primary care clinic.  If you do not have a primary care provider, please call [phone] and they will assist you.        Care EveryWhere ID     This is your Care EveryWhere ID. This could be used by other organizations to access your [place] medical records  [id number]         Blood Pressure from Last 3 Encounters:   09/01/17 (!) 140/96   08/15/17 119/79   07/15/17 (!) 140/93    Weight from Last 3 Encounters:   09/01/17 87.1 kg (192 lb)   08/15/17 87.1 kg (192 lb)   07/15/17 88.5 kg (195 lb)              We Performed the Following     INFRARED THERAPY     MANUAL THER TECH,1+REGIONS,EA 15 MIN     NEUROMUSCULAR RE-EDUCATION     THERAPEUTIC EXERCISES        Primary Care Provider Office Phone # Fax #    Jelly Mcarthur [phone] [address] [facility] [address]  [facility] [address]        Equal Access to Services     [facility] : Hadii aad ku hadasho Soomaali, waaxda luqadaha, qaybta kaalmada adeegyada, waxay idiin haylouisan sona paredes . So [facility] [phone].    ATENCIÓN: Si santos cruz, tiene a alejo disposición servicios gratuitos de asistencia lingüística. Llame al [phone].    We comply with applicable federal civil rights laws and [place] laws. We do not discriminate on the basis of race, color, national origin, age, disability sex, sexual orientation or gender identity.            Thank you!     Thank you for choosing [facility]  for your care. Our goal is always to provide you with excellent care. Hearing back from our patients is one way we can continue to improve our services. Please take a few minutes to complete the written survey that you may receive in the mail after your visit with us. Thank you!             Your Updated Medication List - Protect others around you: Learn how to safely use, store and throw away your medicines at www.disposemymeds.org.          This list is accurate as of: 9/21/17 12:11 PM.  Always use your most recent  med list.                   Brand Name Dispense Instructions for use Diagnosis    Butalbital-APAP-Caffeine -40 MG Caps     30 capsule    Take one capsule as needed for migraine. Max one capsule daily. Do not take with opiate pain medications including oxycodone.    Migraine without status migrainosus, not intractable, unspecified migraine type       diazepam 5 MG tablet    VALIUM    30 tablet    Take 1 tablet (5 mg) by mouth every 6 hours as needed for anxiety, sleep or muscle spasms If you take this medication, do not take any other muscle relaxants.    Fibromyalgia       DULoxetine 30 MG EC capsule    CYMBALTA    90 capsule    TAKE 1 CAPSULE BY MOUTH EVERY DAY    Chronic pain syndrome       hydrOXYzine 25 MG tablet    ATARAX    60 tablet    Take 1-2 tablets (25-50 mg) by mouth every 6 hours as needed for anxiety    Fibromyalgia       naproxen 375 MG tablet    NAPROSYN    60 tablet    Take 1 tablet (375 mg) by mouth 2 times daily as needed for moderate pain    Myofacial muscle pain       omeprazole 20 MG tablet     30 tablet    Take 1 tablet (20 mg) by mouth daily as needed    Nausea       ondansetron 4 MG ODT tab    ZOFRAN-ODT    30 tablet    Take 1 tablet (4 mg) by mouth daily as needed for nausea    Nausea       predniSONE 20 MG tablet    DELTASONE    12 tablet    Take 2 tabs (40 mg) daily x 3 days, 1 tab (20 mg) daily x 3 days, then 1/2 tab (10 mg) x 3 days.    Contact dermatitis due to adhesive bandage       senna-docusate 8.6-50 MG per tablet    KATIANA-COLACE    90 tablet    Take 1-2 tablets by mouth 2 times daily as needed for constipation    Constipation, unspecified constipation type       UNABLE TO FIND      Take 10 drops by mouth 2 times daily MEDICATION NAME: CBD Max Hemp Oil

## 2017-09-28 ENCOUNTER — THERAPY VISIT (OUTPATIENT)
Dept: OCCUPATIONAL THERAPY | Facility: CLINIC | Age: 47
End: 2017-09-28
Payer: COMMERCIAL

## 2017-09-28 DIAGNOSIS — G56.03 CARPAL TUNNEL SYNDROME, BILATERAL: ICD-10-CM

## 2017-09-28 PROCEDURE — 97112 NEUROMUSCULAR REEDUCATION: CPT | Mod: GO | Performed by: OCCUPATIONAL THERAPIST

## 2017-09-28 PROCEDURE — 97026 INFRARED THERAPY: CPT | Mod: GO | Performed by: OCCUPATIONAL THERAPIST

## 2017-09-28 PROCEDURE — 97110 THERAPEUTIC EXERCISES: CPT | Mod: GO | Performed by: OCCUPATIONAL THERAPIST

## 2017-09-28 PROCEDURE — 97140 MANUAL THERAPY 1/> REGIONS: CPT | Mod: GO | Performed by: OCCUPATIONAL THERAPIST

## 2017-09-28 NOTE — MR AVS SNAPSHOT
After Visit Summary   9/28/2017    Maegan Lira    MRN: [id number]           Patient Information     Date Of Birth          1970        Visit Information        Provider Department      9/28/2017 12:00 PM Felicia Summers [facility]        Today's Diagnoses     Carpal tunnel syndrome, bilateral           Follow-ups after your visit        Your next 10 appointments already scheduled     Oct 05, 2017 12:00 PM CDT   [facility]IN[facility] Hand with Felicia [facility] ([facility])    [address] [facility] [address]  [facility] [address] [phone]            Oct 19, 2017 12:00 PM CDT   CINDY Hand with Felicia Summers   [facility] ([facility])    [address] [address] [phone]              Who to contact     If you have questions or need follow up information about today's clinic visit or your schedule please contact [facility] directly at [phone].  Normal or non-critical lab and imaging results will be communicated to you by [employer], letter or phone within 4 business days after the clinic has received the results. If you do not hear from us within 7 days, please contact the clinic through [employer] or phone. If you have a critical or abnormal lab result, we will notify you by phone as soon as possible.  Submit refill requests through [employer] or call your pharmacy and they will forward the refill request to us. Please allow 3 business days for your refill to be completed.          Additional Information About Your Visit        [employer] Information     [employer] gives you secure access to your electronic health record. If you see a primary care provider, you can also send messages to your care team and make appointments. If you have questions, please call your primary care clinic.  If you do not have a primary care provider, please call [phone] and they will assist you.        Care EveryWhere ID     This is your  Care EveryWhere ID. This could be used by other organizations to access your [place] medical records  [id number]         Blood Pressure from Last 3 Encounters:   09/01/17 (!) 140/96   08/15/17 119/79   07/15/17 (!) 140/93    Weight from Last 3 Encounters:   09/01/17 87.1 kg (192 lb)   08/15/17 87.1 kg (192 lb)   07/15/17 88.5 kg (195 lb)              We Performed the Following     INFRARED THERAPY     MANUAL THER TECH,1+REGIONS,EA 15 MIN     NEUROMUSCULAR RE-EDUCATION     THERAPEUTIC EXERCISES        Primary Care Provider Office Phone # Fax #    Jelly Mcarthur, [phone] [address] [address]        Equal Access to Services     KENYON WILKS : Hadii aad ku hadasho Soomaali, waaxda luqadaha, qaybta kaalmada adeegyada, waxay idiin haylouisan sona paredes . So [facility] [phone].    ATENCIÓN: Si habla español, tiene a alejo disposición servicios gratuitos de asistencia lingüística. Llame al [phone].    We comply with applicable federal civil rights laws and [place] laws. We do not discriminate on the basis of race, color, national origin, age, disability sex, sexual orientation or gender identity.            Thank you!     Thank you for choosing [facility]  for your care. Our goal is always to provide you with excellent care. Hearing back from our patients is one way we can continue to improve our services. Please take a few minutes to complete the written survey that you may receive in the mail after your visit with us. Thank you!             Your Updated Medication List - Protect others around you: Learn how to safely use, store and throw away your medicines at www.disposemymeds.org.          This list is accurate as of: 9/28/17 12:59 PM.  Always use your most recent med list.                   Brand Name Dispense Instructions for use Diagnosis    Butalbital-APAP-Caffeine -40 MG Caps     30 capsule    Take one capsule as needed for migraine. Max  one capsule daily. Do not take with opiate pain medications including oxycodone.    Migraine without status migrainosus, not intractable, unspecified migraine type       diazepam 5 MG tablet    VALIUM    30 tablet    Take 1 tablet (5 mg) by mouth every 6 hours as needed for anxiety, sleep or muscle spasms If you take this medication, do not take any other muscle relaxants.    Fibromyalgia       DULoxetine 30 MG EC capsule    CYMBALTA    90 capsule    TAKE 1 CAPSULE BY MOUTH EVERY DAY    Chronic pain syndrome       hydrOXYzine 25 MG tablet    ATARAX    60 tablet    Take 1-2 tablets (25-50 mg) by mouth every 6 hours as needed for anxiety    Fibromyalgia       naproxen 375 MG tablet    NAPROSYN    60 tablet    Take 1 tablet (375 mg) by mouth 2 times daily as needed for moderate pain    Myofacial muscle pain       omeprazole 20 MG tablet     30 tablet    Take 1 tablet (20 mg) by mouth daily as needed    Nausea       ondansetron 4 MG ODT tab    ZOFRAN-ODT    30 tablet    Take 1 tablet (4 mg) by mouth daily as needed for nausea    Nausea       predniSONE 20 MG tablet    DELTASONE    12 tablet    Take 2 tabs (40 mg) daily x 3 days, 1 tab (20 mg) daily x 3 days, then 1/2 tab (10 mg) x 3 days.    Contact dermatitis due to adhesive bandage       senna-docusate 8.6-50 MG per tablet    KATIANA-COLACE    90 tablet    Take 1-2 tablets by mouth 2 times daily as needed for constipation    Constipation, unspecified constipation type       UNABLE TO FIND      Take 10 drops by mouth 2 times daily MEDICATION NAME: CBD Max Hemp Oil

## 2017-09-28 NOTE — PROGRESS NOTES
SOAP note objective information for 9/28/2017    Please refer to the daily flowsheet for treatment today, total treatment time and time spent performing 1:1 timed codes.       Objective:  Pain Level Report  VAS(0-10) 6/1/2017 6/8/2017 6/15/2017 6/20/2017 6/29/2017 9/1/2017 9/14/2017 9/21/2017   At Rest: 7 tingling  R: 6  L: 6 7 tingling  R: 6  L: 6 7 tingling  R: 6  L: 6 7 tingling  R: 6  L: 6 6-7 tingling  R: 5  L: 5 5 tingling  R: 3  L: 3 4 tingling  R: 3  L: 3 3 tingling  R: 3  L: 3   With Use: R: 8  L: 8 R: 8  L: 8 R: 8  L: 8 R: 8  L: 8 R: 8  L: 8 R: 6-7  L: 6-7 R: 6  L: 6 R: 5  L: 5     VAS(0-10) 9/28/2017          At Rest: 3 tingling  R: 3  L: 3          With Use: R: 5  L: 5            Report of Pain:  Location:  All fingers  Description:  pain  Frequency:  constant    Duration:  lingers  Exacerbated by:  use  Relieved by:  meds (somewhat)  Progression: improving    Special Tests:   Date 6/1/2017 6/29/2017 9/1/2017      Left Right Left Right Left Right       Tinels CT             Tinels PIN             Tinels DSRN             Tinels cubital tunnel             Tinels [place]             Phalen's   + + - + - -       Elbow Flexion Test   + + - - - -           ULTT ( % of full glide )  Date 6/1/2017 6/8/17 6/20/2017 6/29/2017 9/1/2017 9/14/2017   Median nerve                 Ulnar nerve    Radial nerve  R:  + at 90 degrees arm abd and wrist extended  L:  + at 60 with wrist extended  R:  + at 90 degrees arm abd and wrist extended  L:  + at 60 with wrist extended R:  + at 60 degrees arm abd and wrist straight  L:  + at 60 with wrist extended R:  + at 45 degrees arm abd and wrist straight  L:  + at 45 with wrist extended R:  + at 80 degrees arm abd and wrist straight  L:  + at 80 with wrist extended R:  + at 80 degrees arm abd and wrist straight  L:  + at 60 with wrist extended     Date 9/21/2017 9/28/2017       Median nerve                 Ulnar nerve    Radial nerve  R:  + at 90 degrees arm abd and wrist  extended  L:  + at 70 with wrist extended  R:  + at 90 degrees arm abd and wrist extended  L:  + at 90 with wrist extended            Cervical Screen: (+ or -)  Date 2017 12/15/2016      Active Comments Passive Comments     Flexion - -      Extension -       Lateral Flexion  Right -      Lateral Flexion  Left -      Rotation Right heaviness      Rotation Left heaviness      Compression Neck -        TOS Special Tests  Date 2017       Left Right Left Right Left Right Left Right Left Right Left Right   Inderjit Test + + + + - + small         Costoclavicular Test + + + + + after45 sec + after 15 sec small and ring         Rae's Test               Adson's Test                   Posture:  []              Normal   []             Forward Neck Posture  [x]             Rounded Forward Shoulders  []             Shoulder Height Difference  Comments:    Patient's proximal musculature at shoulder appears imbalanced with tight pectoralis muscles, subscapularis, upper trapezius, lattisimus and weak scapular stabilizers.  This pattern contributes to overuse of distal musculature.    Edema:  [x]             None  []              Mild   []             Moderate  []              Severe     []              of affected part      Sensation: []              WNL throughout all nerve distributions; per patient report    [x]              Decreased  [x]              Median    [x]             Ulnar    []             Radial nerve distribution    []              See Sensory Evaluation    Description:    STRENGTH:   (Measured in pounds)     2017    Trials Right Left Right Left Right Left  Right Left   1  2  3  Av#-       65#-       65#       68#       70#       71#       3 pt Pinch 2017      Trials Right Left Right Left Right Left  Right Left   1  2  3  Av#-       17#-           Lateral Pinch 2017          Trials Right Left Right Left Right Left  Right Left    1  2  3  Av#-       21#-               Home Exercise Program:  Foam roller massage and stretch pecs  Icing prn  Proximal median nerve glide  Massage fa and upper arm (bicep) (partner to use roller bar to massage)  Clock pec stretch  Latissimus stretch  FM LEP (bilateral)  Extensor stretches  Table top flexor stretch  tricep stretch  Table top flexor stretch  [place] massage  Epsom salts  Can massage tricep  Core strengthening and Lower trap squeezes with nerve gliding  Trigger massage to outer elbow with flexion / extension of elbow  Neck lateral bend with median nerve glide  Use long dowel to push into tight pecs  Back extension exercises over therapy ball  Bullet prosource foam roller for deeper massage    Next Visit:  Laser  Mfr  Stretching  Nerve gliding

## 2017-10-05 ENCOUNTER — THERAPY VISIT (OUTPATIENT)
Dept: OCCUPATIONAL THERAPY | Facility: CLINIC | Age: 47
End: 2017-10-05
Payer: COMMERCIAL

## 2017-10-05 DIAGNOSIS — G56.03 CARPAL TUNNEL SYNDROME, BILATERAL: ICD-10-CM

## 2017-10-05 PROCEDURE — 97140 MANUAL THERAPY 1/> REGIONS: CPT | Mod: GO | Performed by: OCCUPATIONAL THERAPIST

## 2017-10-05 PROCEDURE — 97026 INFRARED THERAPY: CPT | Mod: GO | Performed by: OCCUPATIONAL THERAPIST

## 2017-10-05 PROCEDURE — 97110 THERAPEUTIC EXERCISES: CPT | Mod: GO | Performed by: OCCUPATIONAL THERAPIST

## 2017-10-05 PROCEDURE — 97112 NEUROMUSCULAR REEDUCATION: CPT | Mod: GO | Performed by: OCCUPATIONAL THERAPIST

## 2017-10-05 NOTE — MR AVS SNAPSHOT
After Visit Summary   10/5/2017    Maegan Lira    MRN: [id number]           Patient Information     Date Of Birth          1970        Visit Information        Provider Department      10/5/2017 12:00 PM Felicia Summers [facility]        Today's Diagnoses     Carpal tunnel syndrome, bilateral           Follow-ups after your visit        Your next 10 appointments already scheduled     Oct 19, 2017 12:00 PM CDT   CINDY Hand with Felicia Melina   [facility] ([facility])    [address] [place] [address] [phone]              Who to contact     If you have questions or need follow up information about today's clinic visit or your schedule please contact [facility] directly at [phone].  Normal or non-critical lab and imaging results will be communicated to you by MyChart, letter or phone within 4 business days after the clinic has received the results. If you do not hear from us within 7 days, please contact the clinic through MyChart or phone. If you have a critical or abnormal lab result, we will notify you by phone as soon as possible.  Submit refill requests through [employer] or call your pharmacy and they will forward the refill request to us. Please allow 3 business days for your refill to be completed.          Additional Information About Your Visit        MyChart Information     [employer] gives you secure access to your electronic health record. If you see a primary care provider, you can also send messages to your care team and make appointments. If you have questions, please call your primary care clinic.  If you do not have a primary care provider, please call [phone] and they will assist you.        Care EveryWhere ID     This is your Care EveryWhere ID. This could be used by other organizations to access your [place] medical records  [id number]         Blood Pressure from Last 3 Encounters:   09/01/17 (!) 140/96    08/15/17 119/79   07/15/17 (!) 140/93    Weight from Last 3 Encounters:   09/01/17 87.1 kg (192 lb)   08/15/17 87.1 kg (192 lb)   07/15/17 88.5 kg (195 lb)              We Performed the Following     INFRARED THERAPY     MANUAL THER TECH,1+REGIONS,EA 15 MIN     NEUROMUSCULAR RE-EDUCATION     THERAPEUTIC EXERCISES        Primary Care Provider Office Phone # Fax #    Jelly Mcarthur,  [phone] [address]  [facility] [address]        Equal Access to Services     [facility]: Hadii aad ku hadasho Soomaali, waaxda luqadaha, qaybta kaalmada adeegyada, felicity ibarra haylouisan sona paredes . So [facility] [phone].    ATENCIÓN: Si habla español, tiene a alejo disposición servicios gratuitos de asistencia lingüística. [facility] [phone].    We comply with applicable federal civil rights laws and [place] laws. We do not discriminate on the basis of race, color, national origin, age, disability, sex, sexual orientation, or gender identity.            Thank you!     Thank you for choosing [facility]  for your care. Our goal is always to provide you with excellent care. Hearing back from our patients is one way we can continue to improve our services. Please take a few minutes to complete the written survey that you may receive in the mail after your visit with us. Thank you!             Your Updated Medication List - Protect others around you: Learn how to safely use, store and throw away your medicines at www.disposemymeds.org.          This list is accurate as of: 10/5/17 12:14 PM.  Always use your most recent med list.                   Brand Name Dispense Instructions for use Diagnosis    Butalbital-APAP-Caffeine -40 MG Caps     30 capsule    Take one capsule as needed for migraine. Max one capsule daily. Do not take with opiate pain medications including oxycodone.    Migraine without status migrainosus, not intractable, unspecified migraine type       diazepam 5 MG  tablet    VALIUM    30 tablet    Take 1 tablet (5 mg) by mouth every 6 hours as needed for anxiety, sleep or muscle spasms If you take this medication, do not take any other muscle relaxants.    Fibromyalgia       DULoxetine 30 MG EC capsule    CYMBALTA    90 capsule    TAKE 1 CAPSULE BY MOUTH EVERY DAY    Chronic pain syndrome       hydrOXYzine 25 MG tablet    ATARAX    60 tablet    Take 1-2 tablets (25-50 mg) by mouth every 6 hours as needed for anxiety    Fibromyalgia       naproxen 375 MG tablet    NAPROSYN    60 tablet    Take 1 tablet (375 mg) by mouth 2 times daily as needed for moderate pain    Myofacial muscle pain       omeprazole 20 MG tablet     30 tablet    Take 1 tablet (20 mg) by mouth daily as needed    Nausea       ondansetron 4 MG ODT tab    ZOFRAN-ODT    30 tablet    Take 1 tablet (4 mg) by mouth daily as needed for nausea    Nausea       predniSONE 20 MG tablet    DELTASONE    12 tablet    Take 2 tabs (40 mg) daily x 3 days, 1 tab (20 mg) daily x 3 days, then 1/2 tab (10 mg) x 3 days.    Contact dermatitis due to adhesive bandage       senna-docusate 8.6-50 MG per tablet    KATIANA-COLACE    90 tablet    Take 1-2 tablets by mouth 2 times daily as needed for constipation    Constipation, unspecified constipation type       UNABLE TO FIND      Take 10 drops by mouth 2 times daily MEDICATION NAME: CBD Max Hemp Oil

## 2017-10-05 NOTE — PROGRESS NOTES
SOAP note objective information for 10/5/2017.    Please refer to the daily flowsheet for treatment today, total treatment time and time spent performing 1:1 timed codes.         Objective:  Pain Level Report  VAS(0-10) 6/1/2017 6/8/2017 6/15/2017 6/20/2017 6/29/2017 9/1/2017 9/14/2017 9/21/2017   At Rest: 7 tingling  R: 6  L: 6 7 tingling  R: 6  L: 6 7 tingling  R: 6  L: 6 7 tingling  R: 6  L: 6 6-7 tingling  R: 5  L: 5 5 tingling  R: 3  L: 3 4 tingling  R: 3  L: 3 3 tingling  R: 3  L: 3   With Use: R: 8  L: 8 R: 8  L: 8 R: 8  L: 8 R: 8  L: 8 R: 8  L: 8 R: 6-7  L: 6-7 R: 6  L: 6 R: 5  L: 5     VAS(0-10) 9/28/2017 10/5/2017         At Rest: 3 tingling  R: 3  L: 3 3 tingling  R: 3  L: 3         With Use: R: 5  L: 5 R: 5-6  R elbow 6-8  L: 5-6           Report of Pain:  Location:  All fingers  Description:  pain  Frequency:  constant    Duration:  lingers  Exacerbated by:  use  Relieved by:  meds (somewhat)  Progression: improving    Special Tests:   Date 6/1/2017 6/29/2017 9/1/2017      Left Right Left Right Left Right       Tinels CT             Tinels PIN             Tinels DSRN             Tinels cubital tunnel             Tinels Guyons Canal             Phalen's   + + - + - -       Elbow Flexion Test   + + - - - -           ULTT ( % of full glide )  Date 6/1/2017 6/8/17 6/20/2017 6/29/2017 9/1/2017 9/14/2017   Median nerve                 Ulnar nerve    Radial nerve  R:  + at 90 degrees arm abd and wrist extended  L:  + at 60 with wrist extended  R:  + at 90 degrees arm abd and wrist extended  L:  + at 60 with wrist extended R:  + at 60 degrees arm abd and wrist straight  L:  + at 60 with wrist extended R:  + at 45 degrees arm abd and wrist straight  L:  + at 45 with wrist extended R:  + at 80 degrees arm abd and wrist straight  L:  + at 80 with wrist extended R:  + at 80 degrees arm abd and wrist straight  L:  + at 60 with wrist extended     Date 9/21/2017 9/28/2017 10/5/2017      Median nerve                  Ulnar nerve    Radial nerve  R:  + at 90 degrees arm abd and wrist extended  L:  + at 70 with wrist extended  R:  + at 90 degrees arm abd and wrist extended  L:  + at 90 with wrist extended  R:  + at 80 degrees arm abd and wrist extended  L:  + at 80 with wrist extended           Cervical Screen: (+ or -)  Date 2017 12/15/2016      Active Comments Passive Comments     Flexion - -      Extension -       Lateral Flexion  Right -      Lateral Flexion  Left -      Rotation Right heaviness      Rotation Left heaviness      Compression Neck -        TOS Special Tests  Date 2017       Left Right Left Right Left Right Left Right Left Right Left Right   Inderjit Test + + + + - + small         Costoclavicular Test + + + + + after45 sec + after 15 sec small and ring         Rae's Test               Adson's Test                   Posture:  []              Normal   []             Forward Neck Posture  [x]             Rounded Forward Shoulders  []             Shoulder Height Difference  Comments:    Patient's proximal musculature at shoulder appears imbalanced with tight pectoralis muscles, subscapularis, upper trapezius, lattisimus and weak scapular stabilizers.  This pattern contributes to overuse of distal musculature.    Edema:  [x]             None  []              Mild   []             Moderate  []              Severe     []              of affected part      Sensation: []              WNL throughout all nerve distributions; per patient report    [x]              Decreased  [x]              Median    [x]             Ulnar    []             Radial nerve distribution    []              See Sensory Evaluation    Description:    STRENGTH:   (Measured in pounds)     2017    Trials Right Left Right Left Right Left  Right Left   1  2  3  Av#-       65#-       65#       68#       70#       71#       3 pt Pinch 2017      Trials Right Left Right  Left Right Left  Right Left   1  2  3  Av#-       17#-           Lateral Pinch 2017          Trials Right Left Right Left Right Left  Right Left   1  2  3  Av#-       21#-               Home Exercise Program:  Foam roller massage and stretch pecs  Icing prn  Proximal median nerve glide  Massage fa and upper arm (bicep) (partner to use roller bar to massage)  Clock pec stretch  Latissimus stretch  FM LEP (bilateral)  Extensor stretches  Table top flexor stretch  tricep stretch  Table top flexor stretch  [place] massage  Epsom salts  Can massage tricep  Core strengthening and Lower trap squeezes with nerve gliding  Trigger massage to outer elbow with flexion / extension of elbow  Neck lateral bend with median nerve glide  Use long dowel to push into tight pecs  Back extension exercises over therapy ball  Bullet prosource foam roller for deeper massage    Next Visit:  Laser  Mfr  Stretching  Nerve gliding

## 2017-10-19 ENCOUNTER — THERAPY VISIT (OUTPATIENT)
Dept: OCCUPATIONAL THERAPY | Facility: CLINIC | Age: 47
End: 2017-10-19
Payer: COMMERCIAL

## 2017-10-19 DIAGNOSIS — G56.03 CARPAL TUNNEL SYNDROME, BILATERAL: ICD-10-CM

## 2017-10-19 PROCEDURE — 97110 THERAPEUTIC EXERCISES: CPT | Mod: GO | Performed by: OCCUPATIONAL THERAPIST

## 2017-10-19 PROCEDURE — 97026 INFRARED THERAPY: CPT | Mod: GO | Performed by: OCCUPATIONAL THERAPIST

## 2017-10-19 PROCEDURE — 97140 MANUAL THERAPY 1/> REGIONS: CPT | Mod: GO | Performed by: OCCUPATIONAL THERAPIST

## 2017-10-19 PROCEDURE — 97112 NEUROMUSCULAR REEDUCATION: CPT | Mod: GO | Performed by: OCCUPATIONAL THERAPIST

## 2017-10-19 NOTE — PROGRESS NOTES
Progress Note - Hand Therapy    Current Date:  10/19/2017    Diagnosis:  Bilateral hand pain/weakness  DOI:  October 2015    Number of visits to date:  10    Reporting period is 9/1/2017 to 10/19/2017.    Subjectve:   Subjective changes as noted by patient:  My partner broke her ankle and so I have had to do a  lot more home tasks.  My pain on my right has increased.     Functional changes noted by patient:  Decreased Performance in Household Chores  Patient has noted adverse reaction to:  None        Objective:  Pain Level Report  VAS(0-10) 6/1/2017 6/8/2017 6/15/2017 6/20/2017 6/29/2017 9/1/2017 9/14/2017 9/21/2017   At Rest: 7 tingling  R: 6  L: 6 7 tingling  R: 6  L: 6 7 tingling  R: 6  L: 6 7 tingling  R: 6  L: 6 6-7 tingling  R: 5  L: 5 5 tingling  R: 3  L: 3 4 tingling  R: 3  L: 3 3 tingling  R: 3  L: 3   With Use: R: 8  L: 8 R: 8  L: 8 R: 8  L: 8 R: 8  L: 8 R: 8  L: 8 R: 6-7  L: 6-7 R: 6  L: 6 R: 5  L: 5     VAS(0-10) 9/28/2017 10/5/2017 10/19/17        At Rest: 3 tingling  R: 3  L: 3 3 tingling  R: 3  L: 3 5 tingling  R: 5  L: 5        With Use: R: 5  L: 5 R: 5-6  R elbow 6-8  L: 5-6 R: 7-8  R elbow 3  L: 6          Report of Pain:  Location:  All fingers  Description:  pain  Frequency:  constant    Duration:  lingers  Exacerbated by:  use  Relieved by:  meds (somewhat)  Progression: improving    Special Tests:   Date 6/1/2017 6/29/2017 9/1/2017      Left Right Left Right Left Right       Tinels CT             Tinels PIN             Tinels DSRN             Tinels cubital tunnel             Tinels Guyons Canal             Phalen's   + + - + - -       Elbow Flexion Test   + + - - - -           ULTT ( % of full glide )  Date 6/1/2017 6/8/17 6/20/2017 6/29/2017 9/1/2017 9/14/2017   Median nerve                 Ulnar nerve    Radial nerve  R:  + at 90 degrees arm abd and wrist extended  L:  + at 60 with wrist extended  R:  + at 90 degrees arm abd and wrist extended  L:  + at 60 with wrist extended R:  + at 60  degrees arm abd and wrist straight  L:  + at 60 with wrist extended R:  + at 45 degrees arm abd and wrist straight  L:  + at 45 with wrist extended R:  + at 80 degrees arm abd and wrist straight  L:  + at 80 with wrist extended R:  + at 80 degrees arm abd and wrist straight  L:  + at 60 with wrist extended     Date 9/21/2017 9/28/2017 10/5/2017 10/19/2017     Median nerve                 Ulnar nerve    Radial nerve  R:  + at 90 degrees arm abd and wrist extended  L:  + at 70 with wrist extended  R:  + at 90 degrees arm abd and wrist extended  L:  + at 90 with wrist extended  R:  + at 80 degrees arm abd and wrist extended  L:  + at 80 with wrist extended  R:  + at 90 degrees arm abd and wrist neutral  L:  + at 90 with wrist extended         Cervical Screen: (+ or -)  Date 6/1/2017 12/15/2016      Active Comments Passive Comments     Flexion - -      Extension -       Lateral Flexion  Right -      Lateral Flexion  Left -      Rotation Right heaviness      Rotation Left heaviness      Compression Neck -        TOS Special Tests  Date 6/1/2017 6/29/2017 9/1/2017 10/19/2017      Left Right Left Right Left Right Left Right Left Right Left Right   Inderjit Test + + + + - + small         Costoclavicular Test + + + + + after45 sec + after 15 sec small and ring         Rae's Test               Adson's Test                   Posture:  []              Normal   []             Forward Neck Posture  [x]             Rounded Forward Shoulders  []             Shoulder Height Difference  Comments:    Patient's proximal musculature at shoulder appears imbalanced with tight pectoralis muscles, subscapularis, upper trapezius, lattisimus and weak scapular stabilizers.  This pattern contributes to overuse of distal musculature.    Edema:  [x]             None  []              Mild   []             Moderate  []              Severe     []              of affected part      Sensation: []              WNL throughout all nerve distributions; per  patient report    [x]              Decreased  [x]              Median    [x]             Ulnar    []             Radial nerve distribution    []              See Sensory Evaluation    Description:    STRENGTH:   (Measured in pounds)     2017   2017   2017 10/19/2017     Trials Right Left Right Left Right Left  Right Left   1  2  3  Av#-       65#-       65#       68#       70#       71#       65#       75#     3 pt Pinch 2017      Trials Right Left Right Left Right Left  Right Left   1  2  3  Av#-       17#-           Lateral Pinch 2017          Trials Right Left Right Left Right Left  Right Left   1  2  3  Av#-       21#-             Assessment:  Response to therapy has been decline in:  Strength:  on right due to overuse with house tasks.  Improvement  on left.  Tingling continues on right.  Increase nerve gliding on left.    Overall Assessment:  Patient's symptoms are resolving on left  Patient has experienced an exacerbation of symptoms on right.  STG/LTG:  STGoals have been reviewed and no progress has been made;  see goal sheet for details and changes.    Plan:  Frequency/Duration:  Recommend continuing to see patient  1 X week, and then weaning to every other in 3-4 weeks, once daily  for 3 months  Appropriateness of Rx I have re-evaluated this patient and find that the nature, scope, duration and intensity of the therapy is appropriate for the medical condition of the patient.    Recommendations for Continued Therapy      Home Exercise Program:  Foam roller massage and stretch pecs  Icing prn  Proximal median nerve glide  Massage fa and upper arm (bicep) (partner to use roller bar to massage)  Clock pec stretch  Latissimus stretch  FM LEP (bilateral)  Extensor stretches  Table top flexor stretch  tricep stretch  Table top flexor stretch  [place]amm[place] massage  Epsom salts  Can massage tricep  Core strengthening and Lower trap squeezes with nerve  gliding  Trigger massage to outer elbow with flexion / extension of elbow  Neck lateral bend with median nerve glide  Use long dowel to push into tight pecs  Back extension exercises over therapy ball  Bullet prosource foam roller for deeper massage  Spiky ball massage to flexor wad and pec area    Next Visit:  Laser  Mfr  Stretching  Nerve gliding

## 2017-10-19 NOTE — MR AVS SNAPSHOT
After Visit Summary   10/19/2017    Maegan Lira    MRN: [id number]           Patient Information     Date Of Birth          1970        Visit Information        Provider Department      10/19/2017 12:00 PM Felicia Summers [facility]        Today's Diagnoses     Carpal tunnel syndrome, bilateral           Follow-ups after your visit        Your next 10 appointments already scheduled     Nov 02, 2017 10:30 AM CDT   [facility] Hand with Felicia Medina   [facility] ([facility])    [address] [address]  [place] [address]   [phone]            Nov 09, 2017 12:00 PM CST   [facility]    [address] [address]  [place] [address]   [phone]            Nov 14, 2017 12:00 PM CST   [facility] Hand with Felicia Melina   [facility]    [address] [address]  [place] [address]   [phone]            Nov 30, 2017 12:00 PM CST   [facility] Hand with Felicia Summers   [facility] ([facility])    [address] [address]  [place] [address]   [phone]            Dec 07, 2017 12:00 PM CST   [facility] Hand with Felicia Summers   [facility]    [address] [place] [address]   [phone]              Who to contact     If you have questions or need follow up information about today's clinic visit or your schedule please contact [facility] directly at [phone].  Normal or non-critical lab and imaging results will be communicated to you by MyChart, letter or phone within 4 business days after the clinic has received the results. If you do not hear from us within 7 days, please contact the clinic through MyChart or phone. If you have a critical or abnormal lab result, we will notify you by phone as soon as possible.  Submit refill requests through [employer] or call your pharmacy and  they will forward the refill request to us. Please allow 3 business days for your refill to be completed.          Additional Information About Your Visit        [employer] Information     [employer] gives you secure access to your electronic health record. If you see a primary care provider, you can also send messages to your care team and make appointments. If you have questions, please call your primary care clinic.  If you do not have a primary care provider, please call [phone] and they will assist you.        Care EveryWhere ID     This is your Care EveryWhere ID. This could be used by other organizations to access your [place] medical records  [id number]         Blood Pressure from Last 3 Encounters:   09/01/17 (!) 140/96   08/15/17 119/79   07/15/17 (!) 140/93    Weight from Last 3 Encounters:   09/01/17 87.1 kg (192 lb)   08/15/17 87.1 kg (192 lb)   07/15/17 88.5 kg (195 lb)              We Performed the Following     CINDY PROGRESS NOTES REPORT     INFRARED THERAPY     MANUAL THER TECH,1+REGIONS,EA 15 MIN     NEUROMUSCULAR RE-EDUCATION     THERAPEUTIC EXERCISES        Primary Care Provider Office Phone # Fax #    Jelly Mcarthur,  [phone] [address] [address]        Equal Access to Services     KENYON WILKS : Hadii aad ku hadasho Soomaali, waaxda luqadaha, qaybta kaalmada adeegyada, waxay idiin haylouisan sona nathan. So [facility] [phone].    ATENCIÓN: Si habla español, tiene a alejo disposición servicios gratuitos de asistencia lingüística. Llstefanie al [phone].    We comply with applicable federal civil rights laws and [place] laws. We do not discriminate on the basis of race, color, national origin, age, disability, sex, sexual orientation, or gender identity.            Thank you!     Thank you for choosing [facility]  for your care. Our goal is always to provide you with excellent care. Hearing back from our patients is one way we can  continue to improve our services. Please take a few minutes to complete the written survey that you may receive in the mail after your visit with us. Thank you!             Your Updated Medication List - Protect others around you: Learn how to safely use, store and throw away your medicines at www.disposemymeds.org.          This list is accurate as of: 10/19/17  1:59 PM.  Always use your most recent med list.                   Brand Name Dispense Instructions for use Diagnosis    Butalbital-APAP-Caffeine -40 MG Caps     30 capsule    Take one capsule as needed for migraine. Max one capsule daily. Do not take with opiate pain medications including oxycodone.    Migraine without status migrainosus, not intractable, unspecified migraine type       diazepam 5 MG tablet    VALIUM    30 tablet    Take 1 tablet (5 mg) by mouth every 6 hours as needed for anxiety, sleep or muscle spasms If you take this medication, do not take any other muscle relaxants.    Fibromyalgia       DULoxetine 30 MG EC capsule    CYMBALTA    90 capsule    TAKE 1 CAPSULE BY MOUTH EVERY DAY    Chronic pain syndrome       hydrOXYzine 25 MG tablet    ATARAX    60 tablet    Take 1-2 tablets (25-50 mg) by mouth every 6 hours as needed for anxiety    Fibromyalgia       naproxen 375 MG tablet    NAPROSYN    60 tablet    Take 1 tablet (375 mg) by mouth 2 times daily as needed for moderate pain    Myofacial muscle pain       omeprazole 20 MG tablet     30 tablet    Take 1 tablet (20 mg) by mouth daily as needed    Nausea       ondansetron 4 MG ODT tab    ZOFRAN-ODT    30 tablet    Take 1 tablet (4 mg) by mouth daily as needed for nausea    Nausea       predniSONE 20 MG tablet    DELTASONE    12 tablet    Take 2 tabs (40 mg) daily x 3 days, 1 tab (20 mg) daily x 3 days, then 1/2 tab (10 mg) x 3 days.    Contact dermatitis due to adhesive bandage       senna-docusate 8.6-50 MG per tablet    KATIANA-COLACE    90 tablet    Take 1-2 tablets by mouth 2 times  daily as needed for constipation    Constipation, unspecified constipation type       UNABLE TO FIND      Take 10 drops by mouth 2 times daily MEDICATION NAME: CBD Max Hemp Oil

## 2017-11-02 ENCOUNTER — THERAPY VISIT (OUTPATIENT)
Dept: OCCUPATIONAL THERAPY | Facility: CLINIC | Age: 47
End: 2017-11-02
Payer: COMMERCIAL

## 2017-11-02 ENCOUNTER — ALLIED HEALTH/NURSE VISIT (OUTPATIENT)
Dept: NURSING | Facility: CLINIC | Age: 47
End: 2017-11-02
Payer: COMMERCIAL

## 2017-11-02 DIAGNOSIS — G56.03 CARPAL TUNNEL SYNDROME, BILATERAL: ICD-10-CM

## 2017-11-02 DIAGNOSIS — Z23 NEED FOR PROPHYLACTIC VACCINATION AND INOCULATION AGAINST INFLUENZA: Primary | ICD-10-CM

## 2017-11-02 PROCEDURE — 90471 IMMUNIZATION ADMIN: CPT

## 2017-11-02 PROCEDURE — 97140 MANUAL THERAPY 1/> REGIONS: CPT | Mod: GO | Performed by: OCCUPATIONAL THERAPIST

## 2017-11-02 PROCEDURE — 90686 IIV4 VACC NO PRSV 0.5 ML IM: CPT

## 2017-11-02 PROCEDURE — 97112 NEUROMUSCULAR REEDUCATION: CPT | Mod: GO | Performed by: OCCUPATIONAL THERAPIST

## 2017-11-02 PROCEDURE — 97110 THERAPEUTIC EXERCISES: CPT | Mod: GO | Performed by: OCCUPATIONAL THERAPIST

## 2017-11-02 PROCEDURE — 97026 INFRARED THERAPY: CPT | Mod: GO | Performed by: OCCUPATIONAL THERAPIST

## 2017-11-02 NOTE — PROGRESS NOTES
SOAP note objective information for 11/2/2017.    Please refer to the daily flowsheet for treatment today, total treatment time and time spent performing 1:1 timed codes.           Objective:  Pain Level Report  VAS(0-10) 6/1/2017 6/8/2017 6/15/2017 6/20/2017 6/29/2017 9/1/2017 9/14/2017 9/21/2017   At Rest: 7 tingling  R: 6  L: 6 7 tingling  R: 6  L: 6 7 tingling  R: 6  L: 6 7 tingling  R: 6  L: 6 6-7 tingling  R: 5  L: 5 5 tingling  R: 3  L: 3 4 tingling  R: 3  L: 3 3 tingling  R: 3  L: 3   With Use: R: 8  L: 8 R: 8  L: 8 R: 8  L: 8 R: 8  L: 8 R: 8  L: 8 R: 6-7  L: 6-7 R: 6  L: 6 R: 5  L: 5     VAS(0-10) 9/28/2017 10/5/2017 10/19/17 11/2/2017       At Rest: 3 tingling  R: 3  L: 3 3 tingling  R: 3  L: 3 5 tingling  R: 5  L: 5 7 tingling  R: 7  L: 7       With Use: R: 5  L: 5 R: 5-6  R elbow 6-8  L: 5-6 R: 7-8  R elbow 3  L: 6 R: 9  R elbow 3  L: 9         Report of Pain:  Location:  All fingers  Description:  pain  Frequency:  constant    Duration:  lingers  Exacerbated by:  use  Relieved by:  meds (somewhat)  Progression: improving    Special Tests:   Date 6/1/2017 6/29/2017 9/1/2017      Left Right Left Right Left Right       Tinels CT             Tinels PIN             Tinels DSRN             Tinels cubital tunnel             Tinels Guyons Canal             Phalen's   + + - + - -       Elbow Flexion Test   + + - - - -           ULTT ( % of full glide )  Date 6/1/2017 6/8/17 6/20/2017 6/29/2017 9/1/2017 9/14/2017   Median nerve                 Ulnar nerve    Radial nerve  R:  + at 90 degrees arm abd and wrist extended  L:  + at 60 with wrist extended  R:  + at 90 degrees arm abd and wrist extended  L:  + at 60 with wrist extended R:  + at 60 degrees arm abd and wrist straight  L:  + at 60 with wrist extended R:  + at 45 degrees arm abd and wrist straight  L:  + at 45 with wrist extended R:  + at 80 degrees arm abd and wrist straight  L:  + at 80 with wrist extended R:  + at 80 degrees arm abd and wrist  straight  L:  + at 60 with wrist extended     Date 9/21/2017 9/28/2017 10/5/2017 11/2/2017     Median nerve                 Ulnar nerve    Radial nerve  R:  + at 90 degrees arm abd and wrist extended  L:  + at 70 with wrist extended  R:  + at 90 degrees arm abd and wrist extended  L:  + at 90 with wrist extended  R:  + at 80 degrees arm abd and wrist extended  L:  + at 80 with wrist extended  R:  + at 90 degrees arm abd and wrist neutral  L:  + at 60 with wrist extended         Cervical Screen: (+ or -)  Date 6/1/2017 12/15/2016      Active Comments Passive Comments     Flexion - -      Extension -       Lateral Flexion  Right -      Lateral Flexion  Left -      Rotation Right heaviness      Rotation Left heaviness      Compression Neck -        TOS Special Tests  Date 6/1/2017 6/29/2017 9/1/2017       Left Right Left Right Left Right Left Right Left Right Left Right   Inderjit Test + + + + - + small         Costoclavicular Test + + + + + after45 sec + after 15 sec small and ring         Rae's Test               Adson's Test                   Posture:  []              Normal   []             Forward Neck Posture  [x]             Rounded Forward Shoulders  []             Shoulder Height Difference  Comments:    Patient's proximal musculature at shoulder appears imbalanced with tight pectoralis muscles, subscapularis, upper trapezius, lattisimus and weak scapular stabilizers.  This pattern contributes to overuse of distal musculature.    Edema:  [x]             None  []              Mild   []             Moderate  []              Severe     []              of affected part      Sensation: []              WNL throughout all nerve distributions; per patient report    [x]              Decreased  [x]              Median    [x]             Ulnar    []             Radial nerve distribution    []              See Sensory Evaluation    Description:    STRENGTH:   (Measured in pounds)     6/1/2017 6/29/2017 9/1/2017  10/19/2017     Trials Right Left Right Left Right Left  Right Left   1  2  3  Av#-       65#-       65#       68#       70#       71#       65#       75#     3 pt Pinch 2017      Trials Right Left Right Left Right Left  Right Left   1  2  3  Av#-       17#-           Lateral Pinch 2017          Trials Right Left Right Left Right Left  Right Left   1  2  3  Av#-       21#-             Home Exercise Program:  Foam roller massage and stretch pecs  Icing prn  Proximal median nerve glide  Massage fa and upper arm (bicep) (partner to use roller bar to massage)  Clock pec stretch  Latissimus stretch  FM LEP (bilateral)  Extensor stretches  Table top flexor stretch  tricep stretch  Table top flexor stretch  [place] massage  Epsom salts  Can massage tricep  Core strengthening and Lower trap squeezes with nerve gliding  Trigger massage to outer elbow with flexion / extension of elbow  Neck lateral bend with median nerve glide  Use long dowel to push into tight pecs  Back extension exercises over therapy ball  Bullet prosource foam roller for deeper massage  Spiky ball massage to flexor wad and pec area    Next Visit:  Laser  Mfr  Stretching  Nerve gliding

## 2017-11-02 NOTE — MR AVS SNAPSHOT
"              After Visit Summary   11/2/2017    Maegan Lira    MRN: [id number]           Patient Information     Date Of Birth          1970        Visit Information        Provider Department      11/2/2017 11:45 AM CS NURSE [facility]        Today's Diagnoses     Need for prophylactic vaccination and inoculation against influenza    -  1       Follow-ups after your visit        Your next 10 appointments already scheduled     Nov 09, 2017 11:10 AM CST   MA SCREENING BILATERAL W/ AUBREE with SHBCMA1   [facility] ([facility])    [address] [place] [address], [address]  [facility] [address]   [phone]           Three-dimensional (3D) mammograms are available at [place] locations in [facility], [place], [place], [facility], [place], [place], and [place]. [facility] locations include [place] and [facility] Center in [place]. Benefits of 3D mammograms include: - Improved rate of cancer detection - Decreases your chance of having to go back for more tests, which means fewer: - \"False-positive\" results (This means that there is an abnormal area but it isn't cancer.) - Invasive testing procedures, such as a biopsy or surgery - Can provide clearer images of the breast if you have dense breast tissue. 3D mammography is an optional exam that anyone can have with a 2D mammogram. It doesn't replace or take the place of a 2D mammogram. 2D mammograms remain an effective screening test for all women.  Not all insurance companies cover the cost of a 3D mammogram. Check with your insurance.            Nov 09, 2017 12:00 PM CST   [facility] ([facility])    [address] [place] [address] [address]  [facility] [address]   [phone]            Nov 14, 2017 12:00 PM CST   [facility] ([facility])    [address] [place] [address] [address]  [facility] [address] "   [phone]            Nov 30, 2017 12:00 PM CST   CINDY Hand with Felicia [facility]   [facility] ([facility])    [address] [address] [phone]            Dec 07, 2017 12:00 PM CST   [facility] Hand with Felicia Summers   [facility]    [address] [id number] [phone]              Who to contact     If you have questions or need follow up information about today's clinic visit or your schedule please contact [facility] directly at [phone].  Normal or non-critical lab and imaging results will be communicated to you by ANPIhart, letter or phone within 4 business days after the clinic has received the results. If you do not hear from us within 7 days, please contact the clinic through [employer] or phone. If you have a critical or abnormal lab result, we will notify you by phone as soon as possible.  Submit refill requests through [employer] or call your pharmacy and they will forward the refill request to us. Please allow 3 business days for your refill to be completed.          Additional Information About Your Visit        [employer] Information     [employer] gives you secure access to your electronic health record. If you see a primary care provider, you can also send messages to your care team and make appointments. If you have questions, please call your primary care clinic.  If you do not have a primary care provider, please call [phone] and they will assist you.        Care EveryWhere ID     This is your Care EveryWhere ID. This could be used by other organizations to access your [place] medical records  [id number]         Blood Pressure from Last 3 Encounters:   09/01/17 (!) 140/96   08/15/17 119/79   07/15/17 (!) 140/93    Weight from Last 3 Encounters:   09/01/17 192 lb (87.1 kg)   08/15/17 192 lb (87.1 kg)   07/15/17 195 lb (88.5 kg)              We Performed the Following     FLU  VAC, SPLIT VIRUS IM > 3 YO (QUADRIVALENT) [68904]     Vaccine Administration, Initial [[id number]]        Primary Care Provider Office Phone # Fax #    Jelly Mcarthur,  [phone] [address]  [facility] [address]        Equal Access to Services     MICHAEL WILKS : Hadii aad ku hadasho Soomaali, waaxda luqadaha, qaybta kaalmada adeegyada, waxay idiin hayaan adeeg kharash la'aan . So [facility] [phone].    ATENCIÓN: Si habla español, tiene a alejo disposición servicios gratuitos de asistencia lingüística. Peggy al [phone].    We comply with applicable federal civil rights laws and [place] laws. We do not discriminate on the basis of race, color, national origin, age, disability, sex, sexual orientation, or gender identity.            Thank you!     Thank you for choosing [facility]  for your care. Our goal is always to provide you with excellent care. Hearing back from our patients is one way we can continue to improve our services. Please take a few minutes to complete the written survey that you may receive in the mail after your visit with us. Thank you!             Your Updated Medication List - Protect others around you: Learn how to safely use, store and throw away your medicines at www.disposemymeds.org.          This list is accurate as of: 11/2/17 12:42 PM.  Always use your most recent med list.                   Brand Name Dispense Instructions for use Diagnosis    Butalbital-APAP-Caffeine -40 MG Caps     30 capsule    Take one capsule as needed for migraine. Max one capsule daily. Do not take with opiate pain medications including oxycodone.    Migraine without status migrainosus, not intractable, unspecified migraine type       diazepam 5 MG tablet    VALIUM    30 tablet    Take 1 tablet (5 mg) by mouth every 6 hours as needed for anxiety, sleep or muscle spasms If you take this medication, do not take any other muscle relaxants.    Fibromyalgia        DULoxetine 30 MG EC capsule    CYMBALTA    90 capsule    TAKE 1 CAPSULE BY MOUTH EVERY DAY    Chronic pain syndrome       hydrOXYzine 25 MG tablet    ATARAX    60 tablet    Take 1-2 tablets (25-50 mg) by mouth every 6 hours as needed for anxiety    Fibromyalgia       naproxen 375 MG tablet    NAPROSYN    60 tablet    Take 1 tablet (375 mg) by mouth 2 times daily as needed for moderate pain    Myofacial muscle pain       omeprazole 20 MG tablet     30 tablet    Take 1 tablet (20 mg) by mouth daily as needed    Nausea       ondansetron 4 MG ODT tab    ZOFRAN-ODT    30 tablet    Take 1 tablet (4 mg) by mouth daily as needed for nausea    Nausea       predniSONE 20 MG tablet    DELTASONE    12 tablet    Take 2 tabs (40 mg) daily x 3 days, 1 tab (20 mg) daily x 3 days, then 1/2 tab (10 mg) x 3 days.    Contact dermatitis due to adhesive bandage       senna-docusate 8.6-50 MG per tablet    KATIANA-COLACE    90 tablet    Take 1-2 tablets by mouth 2 times daily as needed for constipation    Constipation, unspecified constipation type       UNABLE TO FIND      Take 10 drops by mouth 2 times daily MEDICATION NAME: CBD Max Hemp Oil

## 2017-11-02 NOTE — MR AVS SNAPSHOT
"              After Visit Summary   11/2/2017    Maegan Lira    MRN: [id number]           Patient Information     Date Of Birth          1970        Visit Information        Provider Department      11/2/2017 10:30 AM [facility]        Today's Diagnoses     Carpal tunnel syndrome, bilateral           Follow-ups after your visit        Your next 10 appointments already scheduled     Nov 09, 2017 11:10 AM CST   MA SCREENING BILATERAL W/ AUBREE with SHBCMA1   [facility] ([facility])    [address], [address]  [facility] [address]   [phone]           Three-dimensional (3D) mammograms are available at [place] locations in [facility], [place], [place], [facility], [place], [place], and [place]. [facility] locations include [place] and [facility] and [facility] Center in [place]. Benefits of 3D mammograms include: - Improved rate of cancer detection - Decreases your chance of having to go back for more tests, which means fewer: - \"False-positive\" results (This means that there is an abnormal area but it isn't cancer.) - Invasive testing procedures, such as a biopsy or surgery - Can provide clearer images of the breast if you have dense breast tissue. 3D mammography is an optional exam that anyone can have with a 2D mammogram. It doesn't replace or take the place of a 2D mammogram. 2D mammograms remain an effective screening test for all women.  Not all insurance companies cover the cost of a 3D mammogram. Check with your insurance.            Nov 09, 2017 12:00 PM CST   [facility] ([facility])    [address]  [facility] [address]   [phone]            Nov 14, 2017 12:00 PM CST   [facility] ([facility])    [address]  [facility] [address]   [phone]            Nov 30, 2017 " 12:00 PM CST   CINDY Hand with Felicia Summers   [facility] ([facility])    [address]  [place] [id number] [phone]            Dec 07, 2017 12:00 PM CST   CINDY Hand with Felicia Summers   [facility] ([facility])    [address]  [place] [address] [phone]              Who to contact     If you have questions or need follow up information about today's clinic visit or your schedule please contact [facility] directly at [phone].  Normal or non-critical lab and imaging results will be communicated to you by [employer], letter or phone within 4 business days after the clinic has received the results. If you do not hear from us within 7 days, please contact the clinic through [employer] or phone. If you have a critical or abnormal lab result, we will notify you by phone as soon as possible.  Submit refill requests through [employer] or call your pharmacy and they will forward the refill request to us. Please allow 3 business days for your refill to be completed.          Additional Information About Your Visit        [employer] Information     [employer] gives you secure access to your electronic health record. If you see a primary care provider, you can also send messages to your care team and make appointments. If you have questions, please call your primary care clinic.  If you do not have a primary care provider, please call [phone] and they will assist you.        Care EveryWhere ID     This is your Care EveryWhere ID. This could be used by other organizations to access your [place] medical records  [id number]         Blood Pressure from Last 3 Encounters:   09/01/17 (!) 140/96   08/15/17 119/79   07/15/17 (!) 140/93    Weight from Last 3 Encounters:   09/01/17 87.1 kg (192 lb)   08/15/17 87.1 kg (192 lb)   07/15/17 88.5 kg (195 lb)              We Performed the Following     INFRARED THERAPY     MANUAL THER  TECH,1+REGIONS,EA 15 MIN     NEUROMUSCULAR RE-EDUCATION     THERAPEUTIC EXERCISES        Primary Care Provider Office Phone # Fax #    Jelly Mcarthur,  [phone] [address]  [facility] [address]        Equal Access to Services     KENYON WILKS : Hadii aad ku hadasho Soomaali, waaxda luqadaha, qaybta kaalmada adeegyada, waxay idiin hayaan adeeg melanie lalisa . So [facility] [phone].    ATENCIÓN: Si habla español, tiene a alejo disposición servicios gratuitos de asistencia lingüística. Llame al [phone].    We comply with applicable federal civil rights laws and [place] laws. We do not discriminate on the basis of race, color, national origin, age, disability, sex, sexual orientation, or gender identity.            Thank you!     Thank you for choosing [facility]  for your care. Our goal is always to provide you with excellent care. Hearing back from our patients is one way we can continue to improve our services. Please take a few minutes to complete the written survey that you may receive in the mail after your visit with us. Thank you!             Your Updated Medication List - Protect others around you: Learn how to safely use, store and throw away your medicines at www.disposemymeds.org.          This list is accurate as of: 11/2/17 12:51 PM.  Always use your most recent med list.                   Brand Name Dispense Instructions for use Diagnosis    Butalbital-APAP-Caffeine -40 MG Caps     30 capsule    Take one capsule as needed for migraine. Max one capsule daily. Do not take with opiate pain medications including oxycodone.    Migraine without status migrainosus, not intractable, unspecified migraine type       diazepam 5 MG tablet    VALIUM    30 tablet    Take 1 tablet (5 mg) by mouth every 6 hours as needed for anxiety, sleep or muscle spasms If you take this medication, do not take any other muscle relaxants.    Fibromyalgia       DULoxetine 30 MG EC  capsule    CYMBALTA    90 capsule    TAKE 1 CAPSULE BY MOUTH EVERY DAY    Chronic pain syndrome       hydrOXYzine 25 MG tablet    ATARAX    60 tablet    Take 1-2 tablets (25-50 mg) by mouth every 6 hours as needed for anxiety    Fibromyalgia       naproxen 375 MG tablet    NAPROSYN    60 tablet    Take 1 tablet (375 mg) by mouth 2 times daily as needed for moderate pain    Myofacial muscle pain       omeprazole 20 MG tablet     30 tablet    Take 1 tablet (20 mg) by mouth daily as needed    Nausea       ondansetron 4 MG ODT tab    ZOFRAN-ODT    30 tablet    Take 1 tablet (4 mg) by mouth daily as needed for nausea    Nausea       predniSONE 20 MG tablet    DELTASONE    12 tablet    Take 2 tabs (40 mg) daily x 3 days, 1 tab (20 mg) daily x 3 days, then 1/2 tab (10 mg) x 3 days.    Contact dermatitis due to adhesive bandage       senna-docusate 8.6-50 MG per tablet    KATIANA-COLACE    90 tablet    Take 1-2 tablets by mouth 2 times daily as needed for constipation    Constipation, unspecified constipation type       UNABLE TO FIND      Take 10 drops by mouth 2 times daily MEDICATION NAME: CBD Max Hemp Oil

## 2017-11-02 NOTE — PROGRESS NOTES
"  Injectable Influenza Immunization Documentation    1.  Is the person to be vaccinated sick today?   No    2. Does the person to be vaccinated have an allergy to a component   of the vaccine?   No  Egg Allergy Algorithm Link    3. Has the person to be vaccinated ever had a serious reaction   to influenza vaccine in the past?   No    4. Has the person to be vaccinated ever had Guillain-Barré syndrome?   No    Form completed by Maegan Lira       Chief Complaint   Patient presents with     Flu Shot       Initial There were no vitals taken for this visit. Estimated body mass index is 34.01 kg/(m^2) as calculated from the following:    Height as of 9/1/17: 5' 3\" (1.6 m).    Weight as of 9/1/17: 192 lb (87.1 kg).  Medication Reconciliation: unable or not appropriate to perform     Prior to injection verified patient identity using patient's name and date of birth.  Per orders of Dr. Mcarthur, injection of Flu given by Jenny Ramirez. Patient instructed to remain in clinic for 15 minutes afterwards, and to report any adverse reaction to me immediately.      "

## 2017-11-05 ENCOUNTER — MYC MEDICAL ADVICE (OUTPATIENT)
Dept: PALLIATIVE MEDICINE | Facility: CLINIC | Age: 47
End: 2017-11-05

## 2017-11-06 ENCOUNTER — MYC MEDICAL ADVICE (OUTPATIENT)
Dept: FAMILY MEDICINE | Facility: CLINIC | Age: 47
End: 2017-11-06

## 2017-11-06 NOTE — LETTER
[facility]  [address] [place] [facility] [address]  Phone: [phone]    11/07/17    Maegan Lira  [address]  [address]  [facility] [address]      To whom it may concern:     Maegan Lira has been under my care for chronic pain since 10/24/2016.  She has been diagnosed with Generalized Anxiety Disorder, by Dr. Fahad Silvestre, a psychologist with whom she previously worked with within [place].  This condition impacts her daily activities and interferes with her ability to manage stressful and challenging situations.    Ms Lira's condition meets the definition of disability under the ADA.  It is my professional opinion that the presence of her emotional support animals will help alleviate Ms. Lira's anxiety disorder.  Her dogs, Juan and Tank serve this purpose and it is appropriate for all reasonable accommodations to be made for Ms. Lira to travel with her Emotional Support Animals.     Sincerely,      Jelly Mcarthur, DO  [facility]

## 2017-11-07 NOTE — TELEPHONE ENCOUNTER
See below, asked pt to submit EVISIT as appropriate for this work and assessment/review.   See pended letter on desk.  Kayla Flores RN

## 2017-11-08 NOTE — TELEPHONE ENCOUNTER
Letter completed and is on my desk. Thank you SO much Kayla for helping out here! I can bring it to triage in the AM whoever is on 5th floor.

## 2017-11-08 NOTE — TELEPHONE ENCOUNTER
Letter at Triage desk on 5th awaiting response from patient if she would like to  or have us mail.  Susan Oh RN

## 2017-11-08 NOTE — TELEPHONE ENCOUNTER
Letter and form at  for patient to  tomorrow.  Also made a copy and will keep at triage in [place].  Susan Oh RN

## 2017-11-09 ENCOUNTER — THERAPY VISIT (OUTPATIENT)
Dept: OCCUPATIONAL THERAPY | Facility: CLINIC | Age: 47
End: 2017-11-09
Payer: COMMERCIAL

## 2017-11-09 ENCOUNTER — HOSPITAL ENCOUNTER (OUTPATIENT)
Dept: MAMMOGRAPHY | Facility: CLINIC | Age: 47
Discharge: HOME | End: 2017-11-09
Attending: INTERNAL MEDICINE | Admitting: INTERNAL MEDICINE
Payer: COMMERCIAL

## 2017-11-09 DIAGNOSIS — G56.03 CARPAL TUNNEL SYNDROME, BILATERAL: ICD-10-CM

## 2017-11-09 DIAGNOSIS — Z12.31 VISIT FOR SCREENING MAMMOGRAM: ICD-10-CM

## 2017-11-09 PROCEDURE — 97110 THERAPEUTIC EXERCISES: CPT | Mod: GO | Performed by: OCCUPATIONAL THERAPIST

## 2017-11-09 PROCEDURE — 97112 NEUROMUSCULAR REEDUCATION: CPT | Mod: GO | Performed by: OCCUPATIONAL THERAPIST

## 2017-11-09 PROCEDURE — G0202 SCR MAMMO BI INCL CAD: HCPCS

## 2017-11-09 PROCEDURE — 97140 MANUAL THERAPY 1/> REGIONS: CPT | Mod: GO | Performed by: OCCUPATIONAL THERAPIST

## 2017-11-09 PROCEDURE — 97026 INFRARED THERAPY: CPT | Mod: GO | Performed by: OCCUPATIONAL THERAPIST

## 2017-11-09 NOTE — PROGRESS NOTES
SOAP note objective information for 11/9/2017    Please refer to the daily flowsheet for treatment today, total treatment time and time spent performing 1:1 timed codes.           Objective:  Pain Level Report  VAS(0-10) 6/1/2017 6/8/2017 6/15/2017 6/20/2017 6/29/2017 9/1/2017 9/14/2017 9/21/2017   At Rest: 7 tingling  R: 6  L: 6 7 tingling  R: 6  L: 6 7 tingling  R: 6  L: 6 7 tingling  R: 6  L: 6 6-7 tingling  R: 5  L: 5 5 tingling  R: 3  L: 3 4 tingling  R: 3  L: 3 3 tingling  R: 3  L: 3   With Use: R: 8  L: 8 R: 8  L: 8 R: 8  L: 8 R: 8  L: 8 R: 8  L: 8 R: 6-7  L: 6-7 R: 6  L: 6 R: 5  L: 5     VAS(0-10) 9/28/2017 10/5/2017 10/19/17 11/2/2017 11/9/2017      At Rest: 3 tingling  R: 3  L: 3 3 tingling  R: 3  L: 3 5 tingling  R: 5  L: 5 7 tingling  R: 7  L: 7 6 tingling  R: 5  L: 5      With Use: R: 5  L: 5 R: 5-6  R elbow 6-8  L: 5-6 R: 7-8  R elbow 3  L: 6 R: 9  R elbow 3  L: 9 R: 8  R elbow 1  L: 8        Report of Pain:  Location:  All fingers  Description:  pain  Frequency:  constant    Duration:  lingers  Exacerbated by:  use  Relieved by:  meds (somewhat)  Progression: improving    Special Tests:   Date 6/1/2017 6/29/2017 9/1/2017      Left Right Left Right Left Right       Tinels CT             Tinels PIN             Tinels DSRN             Tinels cubital tunnel             Tinels Guyons Canal             Phalen's   + + - + - -       Elbow Flexion Test   + + - - - -           ULTT ( % of full glide )  Date 6/1/2017 6/8/17 6/20/2017 6/29/2017 9/1/2017 9/14/2017   Median nerve                 Ulnar nerve    Radial nerve  R:  + at 90 degrees arm abd and wrist extended  L:  + at 60 with wrist extended  R:  + at 90 degrees arm abd and wrist extended  L:  + at 60 with wrist extended R:  + at 60 degrees arm abd and wrist straight  L:  + at 60 with wrist extended R:  + at 45 degrees arm abd and wrist straight  L:  + at 45 with wrist extended R:  + at 80 degrees arm abd and wrist straight  L:  + at 80 with wrist  extended R:  + at 80 degrees arm abd and wrist straight  L:  + at 60 with wrist extended     Date 9/21/2017 9/28/2017 10/5/2017 11/2/2017 11/9/2017    Median nerve                 Ulnar nerve    Radial nerve  R:  + at 90 degrees arm abd and wrist extended  L:  + at 70 with wrist extended  R:  + at 90 degrees arm abd and wrist extended  L:  + at 90 with wrist extended  R:  + at 80 degrees arm abd and wrist extended  L:  + at 80 with wrist extended  R:  + at 90 degrees arm abd and wrist neutral  L:  + at 60 with wrist extended R:  + at 90 degrees arm abd and wrist 45 degree extension  L:  + at 90 with wrist extended fully        Cervical Screen: (+ or -)  Date 6/1/2017       Active        Flexion -       Extension -       Lateral Flexion  Right -      Lateral Flexion  Left -      Rotation Right heaviness      Rotation Left heaviness      Compression Neck -        TOS Special Tests  Date 6/1/2017 6/29/2017 9/1/2017 11/9/2017      Left Right Left Right Left Right Left Right Left Right Left Right   Inderjit Test + + + + - + small + +       Costoclavicular Test + + + + + after45 sec + after 15 sec small and ring - + after 45 sec       Rae's Test               Adson's Test                   Posture:  []              Normal   []             Forward Neck Posture  [x]             Rounded Forward Shoulders  []             Shoulder Height Difference  Comments:    Patient's proximal musculature at shoulder appears imbalanced with tight pectoralis muscles, subscapularis, upper trapezius, lattisimus and weak scapular stabilizers.  This pattern contributes to overuse of distal musculature.    Edema:  [x]             None  []              Mild   []             Moderate  []              Severe     []              of affected part      Sensation: []              WNL throughout all nerve distributions; per patient report    [x]              Decreased  [x]              Median    [x]             Ulnar    []             Radial nerve  distribution    []              See Sensory Evaluation    Description:    STRENGTH:   (Measured in pounds)     2017   2017   2017 10/19/2017     Trials Right Left Right Left Right Left  Right Left   1  2  3  Av#-       65#-       65#       68#       70#       71#       65#       75#     3 pt Pinch 2017      Trials Right Left Right Left Right Left  Right Left   1  2  3  Av#-       17#-           Lateral Pinch 2017          Trials Right Left Right Left Right Left  Right Left   1  2  3  Av#-       21#-             Home Exercise Program:  Foam roller massage and stretch pecs  Icing prn  Proximal median nerve glide  Massage fa and upper arm (bicep) (partner to use roller bar to massage)  Clock pec stretch  Latissimus stretch  FM LEP (bilateral)  Extensor stretches  Table top flexor stretch  tricep stretch  Table top flexor stretch  Amity massage  Epsom salts  Can massage tricep  Core strengthening and Lower trap squeezes with nerve gliding  Trigger massage to outer elbow with flexion / extension of elbow  Neck lateral bend with median nerve glide  Use long dowel to push into tight pecs  Back extension exercises over therapy ball  Bullet prosource foam roller for deeper massage  Spiky ball massage to flexor wad and pec area    Next Visit:  Laser  Mfr  Stretching  Nerve gliding

## 2017-11-09 NOTE — PROGRESS NOTES
SOAP note objective information for 11/9/2017    Please refer to the daily flowsheet for treatment today, total treatment time and time spent performing 1:1 timed codes.           Objective:  Pain Level Report  VAS(0-10) 6/1/2017 6/8/2017 6/15/2017 6/20/2017 6/29/2017 9/1/2017 9/14/2017 9/21/2017   At Rest: 7 tingling  R: 6  L: 6 7 tingling  R: 6  L: 6 7 tingling  R: 6  L: 6 7 tingling  R: 6  L: 6 6-7 tingling  R: 5  L: 5 5 tingling  R: 3  L: 3 4 tingling  R: 3  L: 3 3 tingling  R: 3  L: 3   With Use: R: 8  L: 8 R: 8  L: 8 R: 8  L: 8 R: 8  L: 8 R: 8  L: 8 R: 6-7  L: 6-7 R: 6  L: 6 R: 5  L: 5     VAS(0-10) 9/28/2017 10/5/2017 10/19/17 11/2/2017 11/9/2017      At Rest: 3 tingling  R: 3  L: 3 3 tingling  R: 3  L: 3 5 tingling  R: 5  L: 5 7 tingling  R: 7  L: 7       With Use: R: 5  L: 5 R: 5-6  R elbow 6-8  L: 5-6 R: 7-8  R elbow 3  L: 6 R: 9  R elbow 3  L: 9         Report of Pain:  Location:  All fingers  Description:  pain  Frequency:  constant    Duration:  lingers  Exacerbated by:  use  Relieved by:  meds (somewhat)  Progression: improving    Special Tests:   Date 6/1/2017 6/29/2017 9/1/2017      Left Right Left Right Left Right       Tinels CT             Tinels PIN             Tinels DSRN             Tinels cubital tunnel             Tinels Guyons Canal             Phalen's   + + - + - -       Elbow Flexion Test   + + - - - -           ULTT ( % of full glide )  Date 6/1/2017 6/8/17 6/20/2017 6/29/2017 9/1/2017 9/14/2017   Median nerve                 Ulnar nerve    Radial nerve  R:  + at 90 degrees arm abd and wrist extended  L:  + at 60 with wrist extended  R:  + at 90 degrees arm abd and wrist extended  L:  + at 60 with wrist extended R:  + at 60 degrees arm abd and wrist straight  L:  + at 60 with wrist extended R:  + at 45 degrees arm abd and wrist straight  L:  + at 45 with wrist extended R:  + at 80 degrees arm abd and wrist straight  L:  + at 80 with wrist extended R:  + at 80 degrees arm abd and wrist  straight  L:  + at 60 with wrist extended     Date 9/21/2017 9/28/2017 10/5/2017 11/2/2017 11/9/2017    Median nerve                 Ulnar nerve    Radial nerve  R:  + at 90 degrees arm abd and wrist extended  L:  + at 70 with wrist extended  R:  + at 90 degrees arm abd and wrist extended  L:  + at 90 with wrist extended  R:  + at 80 degrees arm abd and wrist extended  L:  + at 80 with wrist extended  R:  + at 90 degrees arm abd and wrist neutral  L:  + at 60 with wrist extended         Cervical Screen: (+ or -)  Date 6/1/2017       Active        Flexion -       Extension -       Lateral Flexion  Right -      Lateral Flexion  Left -      Rotation Right heaviness      Rotation Left heaviness      Compression Neck -        TOS Special Tests  Date 6/1/2017 6/29/2017 9/1/2017 11/9/2017      Left Right Left Right Left Right Left Right Left Right Left Right   Inderjit Test + + + + - + small + +       Costoclavicular Test + + + + + after45 sec + after 15 sec small and ring + +       Rae's Test               Adson's Test                   Posture:  []              Normal   []             Forward Neck Posture  [x]             Rounded Forward Shoulders  []             Shoulder Height Difference  Comments:    Patient's proximal musculature at shoulder appears imbalanced with tight pectoralis muscles, subscapularis, upper trapezius, lattisimus and weak scapular stabilizers.  This pattern contributes to overuse of distal musculature.    Edema:  [x]             None  []              Mild   []             Moderate  []              Severe     []              of affected part      Sensation: []              WNL throughout all nerve distributions; per patient report    [x]              Decreased  [x]              Median    [x]             Ulnar    []             Radial nerve distribution    []              See Sensory Evaluation    Description:    STRENGTH:   (Measured in pounds)     6/1/2017   6/29/2017   9/1/2017 10/19/2017      Trials Right Left Right Left Right Left  Right Left   1  2  3  Av#-       65#-       65#       68#       70#       71#       65#       75#     3 pt Pinch 2017      Trials Right Left Right Left Right Left  Right Left   1  2  3  Av#-       17#-           Lateral Pinch 2017          Trials Right Left Right Left Right Left  Right Left   1  2  3  Av#-       21#-             Home Exercise Program:  Foam roller massage and stretch pecs  Icing prn  Proximal median nerve glide  Massage fa and upper arm (bicep) (partner to use roller bar to massage)  Clock pec stretch  Latissimus stretch  FM LEP (bilateral)  Extensor stretches  Table top flexor stretch  tricep stretch  Table top flexor stretch  Ar[place] massage  Epsom salts  Can massage tricep  Core strengthening and Lower trap squeezes with nerve gliding  Trigger massage to outer elbow with flexion / extension of elbow  Neck lateral bend with median nerve glide  Use long dowel to push into tight pecs  Back extension exercises over therapy ball  Bullet prosource foam roller for deeper massage  Spiky ball massage to flexor wad and pec area    Next Visit:  Laser  Mfr  Stretching  Nerve gliding

## 2017-11-14 ENCOUNTER — THERAPY VISIT (OUTPATIENT)
Dept: OCCUPATIONAL THERAPY | Facility: CLINIC | Age: 47
End: 2017-11-14
Payer: COMMERCIAL

## 2017-11-14 DIAGNOSIS — G56.03 CARPAL TUNNEL SYNDROME, BILATERAL: ICD-10-CM

## 2017-11-14 PROCEDURE — 97140 MANUAL THERAPY 1/> REGIONS: CPT | Mod: GO | Performed by: OCCUPATIONAL THERAPIST

## 2017-11-14 PROCEDURE — 97026 INFRARED THERAPY: CPT | Mod: GO | Performed by: OCCUPATIONAL THERAPIST

## 2017-11-14 PROCEDURE — 97112 NEUROMUSCULAR REEDUCATION: CPT | Mod: GO | Performed by: OCCUPATIONAL THERAPIST

## 2017-11-14 PROCEDURE — 97110 THERAPEUTIC EXERCISES: CPT | Mod: GO | Performed by: OCCUPATIONAL THERAPIST

## 2017-11-14 NOTE — PROGRESS NOTES
SOAP note objective information for 11/14/2017    Please refer to the daily flowsheet for treatment today, total treatment time and time spent performing 1:1 timed codes.           Objective:  Pain Level Report  VAS(0-10) 6/1/2017 6/8/2017 6/15/2017 6/20/2017 6/29/2017 9/1/2017 9/14/2017 9/21/2017   At Rest: 7 tingling  R: 6  L: 6 7 tingling  R: 6  L: 6 7 tingling  R: 6  L: 6 7 tingling  R: 6  L: 6 6-7 tingling  R: 5  L: 5 5 tingling  R: 3  L: 3 4 tingling  R: 3  L: 3 3 tingling  R: 3  L: 3   With Use: R: 8  L: 8 R: 8  L: 8 R: 8  L: 8 R: 8  L: 8 R: 8  L: 8 R: 6-7  L: 6-7 R: 6  L: 6 R: 5  L: 5     VAS(0-10) 9/28/2017 10/5/2017 10/19/17 11/2/2017 11/9/2017 11/14/17     At Rest: 3 tingling  R: 3  L: 3 3 tingling  R: 3  L: 3 5 tingling  R: 5  L: 5 7 tingling  R: 7  L: 7 6 tingling  R: 5  L: 5 6 tingling  R: 5  L: 5     With Use: R: 5  L: 5 R: 5-6  R elbow 6-8  L: 5-6 R: 7-8  R elbow 3  L: 6 R: 9  R elbow 3  L: 9 R: 8  R elbow 1  L: 8 R: 8  R elbow 1  L: 8       Report of Pain:  Location:  All fingers  Description:  pain  Frequency:  constant    Duration:  lingers  Exacerbated by:  use  Relieved by:  meds (somewhat)  Progression: improving    Special Tests:   Date 6/1/2017 6/29/2017 9/1/2017      Left Right Left Right Left Right       Tinels CT             Tinels PIN             Tinels DSRN             Tinels cubital tunnel             Tinels Guyons Canal             Phalen's   + + - + - -       Elbow Flexion Test   + + - - - -           ULTT ( % of full glide )  Date 6/1/2017 6/8/17 6/20/2017 6/29/2017 9/1/2017 9/14/2017   Median nerve                 Ulnar nerve    Radial nerve  R:  + at 90 degrees arm abd and wrist extended  L:  + at 60 with wrist extended  R:  + at 90 degrees arm abd and wrist extended  L:  + at 60 with wrist extended R:  + at 60 degrees arm abd and wrist straight  L:  + at 60 with wrist extended R:  + at 45 degrees arm abd and wrist straight  L:  + at 45 with wrist extended R:  + at 80 degrees arm  abd and wrist straight  L:  + at 80 with wrist extended R:  + at 80 degrees arm abd and wrist straight  L:  + at 60 with wrist extended     Date 9/21/2017 9/28/2017 10/5/2017 11/2/2017 11/9/2017 11/14/2017   Median nerve                 Ulnar nerve    Radial nerve  R:  + at 90 degrees arm abd and wrist extended  L:  + at 70 with wrist extended  R:  + at 90 degrees arm abd and wrist extended  L:  + at 90 with wrist extended  R:  + at 80 degrees arm abd and wrist extended  L:  + at 80 with wrist extended  R:  + at 90 degrees arm abd and wrist neutral  L:  + at 60 with wrist extended R:  + at 90 degrees arm abd and wrist 45 degree extension  L:  + at 90 with wrist extended fully R:  + at 90 degrees arm abd and wrist 45 degree extension  L:  + at 90 with wrist extended fully       Cervical Screen: (+ or -)  Date 6/1/2017       Active        Flexion -       Extension -       Lateral Flexion  Right -      Lateral Flexion  Left -      Rotation Right heaviness      Rotation Left heaviness      Compression Neck -        TOS Special Tests  Date 6/1/2017 6/29/2017 9/1/2017 11/9/2017      Left Right Left Right Left Right Left Right Left Right Left Right   Inderjit Test + + + + - + small + +       Costoclavicular Test + + + + + after45 sec + after 15 sec small and ring - + after 45 sec       Rae's Test               Adson's Test                   Posture:  []              Normal   []             Forward Neck Posture  [x]             Rounded Forward Shoulders  []             Shoulder Height Difference  Comments:    Patient's proximal musculature at shoulder appears imbalanced with tight pectoralis muscles, subscapularis, upper trapezius, lattisimus and weak scapular stabilizers.  This pattern contributes to overuse of distal musculature.    Edema:  [x]             None  []              Mild   []             Moderate  []              Severe     []              of affected part      Sensation: []              WNL throughout all  nerve distributions; per patient report    [x]              Decreased  [x]              Median    [x]             Ulnar    []             Radial nerve distribution    []              See Sensory Evaluation    Description:    STRENGTH:   (Measured in pounds)     2017   2017   2017 10/19/2017     Trials Right Left Right Left Right Left  Right Left   1  2  3  Av#-       65#-       65#       68#       70#       71#       65#       75#     3 pt Pinch 2017      Trials Right Left Right Left Right Left  Right Left   1  2  3  Av#-       17#-           Lateral Pinch 2017          Trials Right Left Right Left Right Left  Right Left   1  2  3  Av#-       21#-             Home Exercise Program:  Foam roller massage and stretch pecs  Icing prn  Proximal median nerve glide  Massage fa and upper arm (bicep) (partner to use roller bar to massage)  Clock pec stretch  Latissimus stretch  FM LEP (bilateral)  Extensor stretches  Table top flexor stretch  tricep stretch  Table top flexor stretch  [place] massage  Epsom salts  Can massage tricep  Core strengthening and Lower trap squeezes with nerve gliding  Trigger massage to outer elbow with flexion / extension of elbow  Neck lateral bend with median nerve glide  Use long dowel to push into tight pecs  Back extension exercises over therapy ball  Bullet prosource foam roller for deeper massage  Spiky ball massage to flexor wad and pec area    Next Visit:  Laser  Mfr  Stretching  Nerve gliding

## 2017-11-14 NOTE — MR AVS SNAPSHOT
After Visit Summary   11/14/2017    Maegan Lira    MRN: [id number]           Patient Information     Date Of Birth          1970        Visit Information        Provider Department      11/14/2017 12:00 PM [facility]        Today's Diagnoses     Carpal tunnel syndrome, bilateral           Follow-ups after your visit        Your next 10 appointments already scheduled     Nov 30, 2017 12:00 PM CST   [facility] ([facility])    [address]  [facility] [id number] [phone]            Dec 07, 2017 12:00 PM CST   [facility] with [facility]    [address] [id number] [phone]              Who to contact     If you have questions or need follow up information about today's clinic visit or your schedule please contact [facility] directly at [phone].  Normal or non-critical lab and imaging results will be communicated to you by nCinohart, letter or phone within 4 business days after the clinic has received the results. If you do not hear from us within 7 days, please contact the clinic through [email or web address] or phone. If you have a critical or abnormal lab result, we will notify you by phone as soon as possible.  Submit refill requests through [email or web address] or call your pharmacy and they will forward the refill request to us. Please allow 3 business days for your refill to be completed.          Additional Information About Your Visit        nCinoharKinoos Information     [email or web address] gives you secure access to your electronic health record. If you see a primary care provider, you can also send messages to your care team and make appointments. If you have questions, please call your primary care clinic.  If you do not have a primary care provider, please call [phone] and they will assist you.        Care EveryWhere ID     This is  your Care EveryWhere ID. This could be used by other organizations to access your [place] medical records  [id number]         Blood Pressure from Last 3 Encounters:   09/01/17 (!) 140/96   08/15/17 119/79   07/15/17 (!) 140/93    Weight from Last 3 Encounters:   09/01/17 87.1 kg (192 lb)   08/15/17 87.1 kg (192 lb)   07/15/17 88.5 kg (195 lb)              We Performed the Following     INFRARED THERAPY     MANUAL THER TECH,1+REGIONS,EA 15 MIN     NEUROMUSCULAR RE-EDUCATION     THERAPEUTIC EXERCISES        Primary Care Provider Office Phone # Fax #    Jelly Mcarthur, [phone] [address] [address]        Equal Access to Services     KENYON WILKS : Hadii aad ku hadasho Soomaali, waaxda luqadaha, qaybta kaalmada adeegyada, waxay stacey hayrachel paredes . So [facility] [phone].    ATENCIÓN: Si habla español, tiene a alejo disposición servicios gratuitos de asistencia lingüística. Llame al [phone].    We comply with applicable federal civil rights laws and [place] laws. We do not discriminate on the basis of race, color, national origin, age, disability, sex, sexual orientation, or gender identity.            Thank you!     Thank you for choosing [facility]  for your care. Our goal is always to provide you with excellent care. Hearing back from our patients is one way we can continue to improve our services. Please take a few minutes to complete the written survey that you may receive in the mail after your visit with us. Thank you!             Your Updated Medication List - Protect others around you: Learn how to safely use, store and throw away your medicines at www.disposemymeds.org.          This list is accurate as of: 11/14/17 12:06 PM.  Always use your most recent med list.                   Brand Name Dispense Instructions for use Diagnosis    Butalbital-APAP-Caffeine -40 MG Caps     30 capsule    Take one capsule as needed for  migraine. Max one capsule daily. Do not take with opiate pain medications including oxycodone.    Migraine without status migrainosus, not intractable, unspecified migraine type       diazepam 5 MG tablet    VALIUM    30 tablet    Take 1 tablet (5 mg) by mouth every 6 hours as needed for anxiety, sleep or muscle spasms If you take this medication, do not take any other muscle relaxants.    Fibromyalgia       DULoxetine 30 MG EC capsule    CYMBALTA    90 capsule    TAKE 1 CAPSULE BY MOUTH EVERY DAY    Chronic pain syndrome       hydrOXYzine 25 MG tablet    ATARAX    60 tablet    Take 1-2 tablets (25-50 mg) by mouth every 6 hours as needed for anxiety    Fibromyalgia       naproxen 375 MG tablet    NAPROSYN    60 tablet    Take 1 tablet (375 mg) by mouth 2 times daily as needed for moderate pain    Myofacial muscle pain       omeprazole 20 MG tablet     30 tablet    Take 1 tablet (20 mg) by mouth daily as needed    Nausea       ondansetron 4 MG ODT tab    ZOFRAN-ODT    30 tablet    Take 1 tablet (4 mg) by mouth daily as needed for nausea    Nausea       predniSONE 20 MG tablet    DELTASONE    12 tablet    Take 2 tabs (40 mg) daily x 3 days, 1 tab (20 mg) daily x 3 days, then 1/2 tab (10 mg) x 3 days.    Contact dermatitis due to adhesive bandage       senna-docusate 8.6-50 MG per tablet    KATIANA-COLACE    90 tablet    Take 1-2 tablets by mouth 2 times daily as needed for constipation    Constipation, unspecified constipation type       UNABLE TO FIND      Take 10 drops by mouth 2 times daily MEDICATION NAME: CBD Max Hemp Oil

## 2017-11-30 ENCOUNTER — THERAPY VISIT (OUTPATIENT)
Dept: OCCUPATIONAL THERAPY | Facility: CLINIC | Age: 47
End: 2017-11-30
Payer: COMMERCIAL

## 2017-11-30 DIAGNOSIS — G56.03 CARPAL TUNNEL SYNDROME, BILATERAL: ICD-10-CM

## 2017-11-30 PROCEDURE — 97140 MANUAL THERAPY 1/> REGIONS: CPT | Mod: GO | Performed by: OCCUPATIONAL THERAPIST

## 2017-11-30 PROCEDURE — 97110 THERAPEUTIC EXERCISES: CPT | Mod: GO | Performed by: OCCUPATIONAL THERAPIST

## 2017-11-30 PROCEDURE — 97026 INFRARED THERAPY: CPT | Mod: GO | Performed by: OCCUPATIONAL THERAPIST

## 2017-11-30 PROCEDURE — 97112 NEUROMUSCULAR REEDUCATION: CPT | Mod: GO | Performed by: OCCUPATIONAL THERAPIST

## 2017-11-30 NOTE — PROGRESS NOTES
Progress Note - Hand Therapy    Current Date:  11/30/2017    Diagnosis:  Bilateral hand pain/weakness  DOI:  October 2015    Number of visits to date:  14    Reporting period is 10/19/2017 to 11/30/2017.    Subjectve:   Subjective changes as noted by patient:  I had some numbness when I reached to put rice bag into the microwave and then had a panic attack.     Functional changes noted by patient:  Improvement in Household Chores and Self Care Tasks  Patient has noted adverse reaction to:  None        Objective:  Pain Level Report  VAS(0-10) 6/1/2017 6/8/2017 6/15/2017 6/20/2017 6/29/2017 9/1/2017 9/14/2017 9/21/2017   At Rest: 7 tingling  R: 6  L: 6 7 tingling  R: 6  L: 6 7 tingling  R: 6  L: 6 7 tingling  R: 6  L: 6 6-7 tingling  R: 5  L: 5 5 tingling  R: 3  L: 3 4 tingling  R: 3  L: 3 3 tingling  R: 3  L: 3   With Use: R: 8  L: 8 R: 8  L: 8 R: 8  L: 8 R: 8  L: 8 R: 8  L: 8 R: 6-7  L: 6-7 R: 6  L: 6 R: 5  L: 5     VAS(0-10) 9/28/2017 10/5/2017 10/19/17 11/2/2017 11/9/2017 11/14/17 11/30/17    At Rest: 3 tingling  R: 3  L: 3 3 tingling  R: 3  L: 3 5 tingling  R: 5  L: 5 7 tingling  R: 7  L: 7 6 tingling  R: 5  L: 5 6 tingling  R: 5  L: 5 6 tingling  R: 5  L: 5    With Use: R: 5  L: 5 R: 5-6  R elbow 6-8  L: 5-6 R: 7-8  R elbow 3  L: 6 R: 9  R elbow 3  L: 9 R: 8  R elbow 1  L: 8 R: 8  R elbow 1  L: 8 R: 8  R elbow 1  L: 8      Report of Pain:  Location:  All fingers  Description:  pain  Frequency:  constant    Duration:  lingers  Exacerbated by:  use  Relieved by:  meds (somewhat)  Progression: improving    Special Tests:   Date 6/1/2017 6/29/2017 9/1/2017 11/30/2017     Left Right Left Right Left Right Left Right     Tinels CT             Tinels PIN             Tinels DSRN             Tinels cubital tunnel             Tinels Guyons Canal             Phalen's   + + - + - - - -     Elbow Flexion Test   + + - - - - - +         ULTT ( % of full glide )  Date 6/1/2017 6/8/17 6/20/2017 6/29/2017 9/1/2017 9/14/2017    Median nerve                 Ulnar nerve    Radial nerve  R:  + at 90 degrees arm abd and wrist extended  L:  + at 60 with wrist extended  R:  + at 90 degrees arm abd and wrist extended  L:  + at 60 with wrist extended R:  + at 60 degrees arm abd and wrist straight  L:  + at 60 with wrist extended R:  + at 45 degrees arm abd and wrist straight  L:  + at 45 with wrist extended R:  + at 80 degrees arm abd and wrist straight  L:  + at 80 with wrist extended R:  + at 80 degrees arm abd and wrist straight  L:  + at 60 with wrist extended     Date 9/21/2017 9/28/2017 10/5/2017 11/2/2017 11/9/2017 11/14/2017   Median nerve                   Ulnar nerve    Radial nerve  R:  + at 90 degrees arm abd and wrist extended  L:  + at 70 with wrist extended  R:  + at 90 degrees arm abd and wrist extended  L:  + at 90 with wrist extended  R:  + at 80 degrees arm abd and wrist extended  L:  + at 80 with wrist extended  R:  + at 90 degrees arm abd and wrist neutral  L:  + at 60 with wrist extended R:  + at 90 degrees arm abd and wrist 45 degree extension  L:  + at 90 with wrist extended fully R:  + at 90 degrees arm abd and wrist 45 degree extension  L:  + at 90 with wrist extended fully     Date 11/30/2017        Median nerve                     Ulnar nerve    Radial nerve  R:  + at 45 degrees arm abd and wrist neutral  L:  + at 90 with wrist neutral            Cervical Screen: (+ or -)  Date 6/1/2017       Active        Flexion -       Extension -       Lateral Flexion  Right -      Lateral Flexion  Left -      Rotation Right heaviness      Rotation Left heaviness      Compression Neck -        TOS Special Tests  Date 6/1/2017 6/29/2017 9/1/2017 11/9/2017 11/30/2017     Left Right Left Right Left Right Left Right Left Right Left Right   Inderjit Test + + + + - + small + + + after 35 sec + after 35 sec     Costoclavicular Test + + + + + after45 sec + after 15 sec small and ring - + after 45 sec + after 20 sec + after 20 sec     Butch's  Test               Adson's Test                   Posture:  []              Normal   []             Forward Neck Posture  [x]             Rounded Forward Shoulders  []             Shoulder Height Difference  Comments:    Patient's proximal musculature at shoulder appears imbalanced with tight pectoralis muscles, subscapularis, upper trapezius, lattisimus and weak scapular stabilizers.  This pattern contributes to overuse of distal musculature.    Edema:  [x]             None  []              Mild   []             Moderate  []              Severe     []              of affected part      Sensation: []              WNL throughout all nerve distributions; per patient report    [x]              Decreased  [x]              Median    [x]             Ulnar    []             Radial nerve distribution    []              See Sensory Evaluation    Description:    STRENGTH:   (Measured in pounds)     2017   2017   2017 10/19/2017     Trials Right Left Right Left Right Left  Right Left   1  2  3  Av#-       65#-       65#       68#       70#       71#       65#       75#      2017      Trials Right Left Right Left Right Left  Right Left   1  2  3  Av#       70#             3 pt Pinch 2017     Trials Right Left Right Left Right Left  Right Left   1  2  3  Av#-       17#-       19#       19#           Lateral Pinch 2017       Trials Right Left Right Left Right Left  Right Left   1  2  3  Av#-       21#-       20#       21#           Assessment:  Response to therapy has been improvement to:  Self Care Skills:  All daily tasks.  Response to therapy has been lack of progress in:  Paresthesias:  Median nerve   Ulnar nerve - more intense numbness and tingling    Overall Assessment:  Patient has experienced an exacerbation of symptoms.  STG/LTG:  STGoals have been reviewed and no progress has been made;  see goal sheet for details and  changes.    Plan:  Frequency/Duration:  Recommend continuing to see patient 1 more visit and then patient leaves for [place] for 3 months.  Appropriateness of Rx I have re-evaluated this patient and find that the nature, scope, duration and intensity of the therapy is appropriate for the medical condition of the patient.    Recommendations for Continued Therapy      Home Exercise Program:  Foam roller massage and stretch pecs  Icing prn  Proximal median nerve glide  Massage fa and upper arm (bicep) (partner to use roller bar to massage)  Clock pec stretch  Latissimus stretch  FM LEP (bilateral)  Extensor stretches  Table top flexor stretch  tricep stretch  Table top flexor stretch  [place] massage  Epsom salts  Can massage tricep  Core strengthening and Lower trap squeezes with nerve gliding  Trigger massage to outer elbow with flexion / extension of elbow  Neck lateral bend with median nerve glide  Use long dowel to push into tight pecs  Back extension exercises over therapy ball  Bullet prosource foam roller for deeper massage  Spiky ball massage to flexor wad and pec area    Next Visit:  Laser  Mfr  Stretching  Nerve gliding

## 2017-11-30 NOTE — MR AVS SNAPSHOT
After Visit Summary   11/30/2017    Maegan Lira    MRN: [id number]           Patient Information     Date Of Birth          1970        Visit Information        Provider Department      11/30/2017 12:00 PM Felicia Summers [facility]        Today's Diagnoses     Carpal tunnel syndrome, bilateral           Follow-ups after your visit        Your next 10 appointments already scheduled     Dec 07, 2017 12:00 PM CST   CINDY Hand with Felicia Melina   [facility]    [address] [place] [id number] [phone]              Who to contact     If you have questions or need follow up information about today's clinic visit or your schedule please contact [facility] directly at [phone].  Normal or non-critical lab and imaging results will be communicated to you by MyChart, letter or phone within 4 business days after the clinic has received the results. If you do not hear from us within 7 days, please contact the clinic through MyChart or phone. If you have a critical or abnormal lab result, we will notify you by phone as soon as possible.  Submit refill requests through [employer] or call your pharmacy and they will forward the refill request to us. Please allow 3 business days for your refill to be completed.          Additional Information About Your Visit        MyChart Information     [employer] gives you secure access to your electronic health record. If you see a primary care provider, you can also send messages to your care team and make appointments. If you have questions, please call your primary care clinic.  If you do not have a primary care provider, please call [phone] and they will assist you.        Care EveryWhere ID     This is your Care EveryWhere ID. This could be used by other organizations to access your [place] medical records  [id number]         Blood Pressure from Last 3 Encounters:   09/01/17 (!)  140/96   08/15/17 119/79   07/15/17 (!) 140/93    Weight from Last 3 Encounters:   09/01/17 87.1 kg (192 lb)   08/15/17 87.1 kg (192 lb)   07/15/17 88.5 kg (195 lb)              We Performed the Following     CINDY PROGRESS NOTES REPORT     INFRARED THERAPY     MANUAL THER TECH,1+REGIONS,EA 15 MIN     NEUROMUSCULAR RE-EDUCATION     THERAPEUTIC EXERCISES        Primary Care Provider Office Phone # Fax #    Jelly Mcarthur,  [phone] [address]  [facility] [address]        Equal Access to Services     [facility]: Hadii aad ku hadasho Soomaali, waaxda luqadaha, qaybta kaalmada adeegyada, waxjs ibarra haylouisan sona paredes . So [facility] [phone].    ATENCIÓN: Si habla español, tiene a alejo disposición servicios gratuitos de asistencia lingüística. Llame al [phone].    We comply with applicable federal civil rights laws and [place] laws. We do not discriminate on the basis of race, color, national origin, age, disability, sex, sexual orientation, or gender identity.            Thank you!     Thank you for choosing [facility]  for your care. Our goal is always to provide you with excellent care. Hearing back from our patients is one way we can continue to improve our services. Please take a few minutes to complete the written survey that you may receive in the mail after your visit with us. Thank you!             Your Updated Medication List - Protect others around you: Learn how to safely use, store and throw away your medicines at www.disposemymeds.org.          This list is accurate as of: 11/30/17  2:09 PM.  Always use your most recent med list.                   Brand Name Dispense Instructions for use Diagnosis    Butalbital-APAP-Caffeine -40 MG Caps     30 capsule    Take one capsule as needed for migraine. Max one capsule daily. Do not take with opiate pain medications including oxycodone.    Migraine without status migrainosus, not intractable,  unspecified migraine type       diazepam 5 MG tablet    VALIUM    30 tablet    Take 1 tablet (5 mg) by mouth every 6 hours as needed for anxiety, sleep or muscle spasms If you take this medication, do not take any other muscle relaxants.    Fibromyalgia       DULoxetine 30 MG EC capsule    CYMBALTA    90 capsule    TAKE 1 CAPSULE BY MOUTH EVERY DAY    Chronic pain syndrome       hydrOXYzine 25 MG tablet    ATARAX    60 tablet    Take 1-2 tablets (25-50 mg) by mouth every 6 hours as needed for anxiety    Fibromyalgia       naproxen 375 MG tablet    NAPROSYN    60 tablet    Take 1 tablet (375 mg) by mouth 2 times daily as needed for moderate pain    Myofacial muscle pain       omeprazole 20 MG tablet     30 tablet    Take 1 tablet (20 mg) by mouth daily as needed    Nausea       ondansetron 4 MG ODT tab    ZOFRAN-ODT    30 tablet    Take 1 tablet (4 mg) by mouth daily as needed for nausea    Nausea       predniSONE 20 MG tablet    DELTASONE    12 tablet    Take 2 tabs (40 mg) daily x 3 days, 1 tab (20 mg) daily x 3 days, then 1/2 tab (10 mg) x 3 days.    Contact dermatitis due to adhesive bandage       senna-docusate 8.6-50 MG per tablet    KATIANA-COLACE    90 tablet    Take 1-2 tablets by mouth 2 times daily as needed for constipation    Constipation, unspecified constipation type       UNABLE TO FIND      Take 10 drops by mouth 2 times daily MEDICATION NAME: CBD Max Hemp Oil

## 2017-12-07 ENCOUNTER — THERAPY VISIT (OUTPATIENT)
Dept: OCCUPATIONAL THERAPY | Facility: CLINIC | Age: 47
End: 2017-12-07
Payer: COMMERCIAL

## 2017-12-07 DIAGNOSIS — G56.03 CARPAL TUNNEL SYNDROME, BILATERAL: ICD-10-CM

## 2017-12-07 PROCEDURE — 97112 NEUROMUSCULAR REEDUCATION: CPT | Mod: GO | Performed by: OCCUPATIONAL THERAPIST

## 2017-12-07 PROCEDURE — 97026 INFRARED THERAPY: CPT | Mod: GO | Performed by: OCCUPATIONAL THERAPIST

## 2017-12-07 PROCEDURE — 97140 MANUAL THERAPY 1/> REGIONS: CPT | Mod: GO | Performed by: OCCUPATIONAL THERAPIST

## 2017-12-07 PROCEDURE — 97110 THERAPEUTIC EXERCISES: CPT | Mod: GO | Performed by: OCCUPATIONAL THERAPIST

## 2017-12-07 NOTE — MR AVS SNAPSHOT
After Visit Summary   12/7/2017    Maegan Lira    MRN: [id number]           Patient Information     Date Of Birth          1970        Visit Information        Provider Department      12/7/2017 12:00 PM Felicia Summers [facility]        Today's Diagnoses     Carpal tunnel syndrome, bilateral           Follow-ups after your visit        Who to contact     If you have questions or need follow up information about today's clinic visit or your schedule please contact [facility] directly at [phone].  Normal or non-critical lab and imaging results will be communicated to you by [employer], letter or phone within 4 business days after the clinic has received the results. If you do not hear from us within 7 days, please contact the clinic through [email or web address] or phone. If you have a critical or abnormal lab result, we will notify you by phone as soon as possible.  Submit refill requests through [facility] or call your pharmacy and they will forward the refill request to us. Please allow 3 business days for your refill to be completed.          Additional Information About Your Visit        MyChart Information     [facility] gives you secure access to your electronic health record. If you see a primary care provider, you can also send messages to your care team and make appointments. If you have questions, please call your primary care clinic.  If you do not have a primary care provider, please call [phone] and they will assist you.        Care EveryWhere ID     This is your Care EveryWhere ID. This could be used by other organizations to access your [place] medical records  [id number]         Blood Pressure from Last 3 Encounters:   09/01/17 (!) 140/96   08/15/17 119/79   07/15/17 (!) 140/93    Weight from Last 3 Encounters:   09/01/17 87.1 kg (192 lb)   08/15/17 87.1 kg (192 lb)   07/15/17 88.5 kg (195 lb)              We Performed the Following     INFRARED THERAPY     MANUAL  THER TECH,1+REGIONS,EA 15 MIN     NEUROMUSCULAR RE-EDUCATION     THERAPEUTIC EXERCISES        Primary Care Provider Office Phone # Fax #    Jelly Mcarthur,  [phone] [address] [address]  [facility] [address]        Equal Access to Services     [facility] : Hadii aad ku hadasho Soomaali, waaxda luqadaha, qaybta kaalmada adeegyada, waxay idiin hayaan adeeg waynevik lalisa . So [facility] [phone].    ATENCIÓN: Si habla español, tiene a alejo disposición servicios gratuitos de asistencia lingüística. Llame al [phone].    We comply with applicable federal civil rights laws and [place] laws. We do not discriminate on the basis of race, color, national origin, age, disability, sex, sexual orientation, or gender identity.            Thank you!     Thank you for choosing [facility]  for your care. Our goal is always to provide you with excellent care. Hearing back from our patients is one way we can continue to improve our services. Please take a few minutes to complete the written survey that you may receive in the mail after your visit with us. Thank you!             Your Updated Medication List - Protect others around you: Learn how to safely use, store and throw away your medicines at www.disposemymeds.org.          This list is accurate as of: 12/7/17 12:08 PM.  Always use your most recent med list.                   Brand Name Dispense Instructions for use Diagnosis    Butalbital-APAP-Caffeine -40 MG Caps     30 capsule    Take one capsule as needed for migraine. Max one capsule daily. Do not take with opiate pain medications including oxycodone.    Migraine without status migrainosus, not intractable, unspecified migraine type       diazepam 5 MG tablet    VALIUM    30 tablet    Take 1 tablet (5 mg) by mouth every 6 hours as needed for anxiety, sleep or muscle spasms If you take this medication, do not take any other muscle relaxants.    Fibromyalgia       DULoxetine 30 MG  EC capsule    CYMBALTA    90 capsule    TAKE 1 CAPSULE BY MOUTH EVERY DAY    Chronic pain syndrome       hydrOXYzine 25 MG tablet    ATARAX    60 tablet    Take 1-2 tablets (25-50 mg) by mouth every 6 hours as needed for anxiety    Fibromyalgia       naproxen 375 MG tablet    NAPROSYN    60 tablet    Take 1 tablet (375 mg) by mouth 2 times daily as needed for moderate pain    Myofacial muscle pain       omeprazole 20 MG tablet     30 tablet    Take 1 tablet (20 mg) by mouth daily as needed    Nausea       ondansetron 4 MG ODT tab    ZOFRAN-ODT    30 tablet    Take 1 tablet (4 mg) by mouth daily as needed for nausea    Nausea       predniSONE 20 MG tablet    DELTASONE    12 tablet    Take 2 tabs (40 mg) daily x 3 days, 1 tab (20 mg) daily x 3 days, then 1/2 tab (10 mg) x 3 days.    Contact dermatitis due to adhesive bandage       senna-docusate 8.6-50 MG per tablet    KATIANA-COLACE    90 tablet    Take 1-2 tablets by mouth 2 times daily as needed for constipation    Constipation, unspecified constipation type       UNABLE TO FIND      Take 10 drops by mouth 2 times daily MEDICATION NAME: CBD Max Hemp Oil

## 2017-12-07 NOTE — PROGRESS NOTES
SOAP note objective information for 12/7/2017.    Please refer to the daily flowsheet for treatment today, total treatment time and time spent performing 1:1 timed codes.           Objective:  Pain Level Report  VAS(0-10) 6/1/2017 6/8/2017 6/15/2017 6/20/2017 6/29/2017 9/1/2017 9/14/2017 9/21/2017   At Rest: 7 tingling  R: 6  L: 6 7 tingling  R: 6  L: 6 7 tingling  R: 6  L: 6 7 tingling  R: 6  L: 6 6-7 tingling  R: 5  L: 5 5 tingling  R: 3  L: 3 4 tingling  R: 3  L: 3 3 tingling  R: 3  L: 3   With Use: R: 8  L: 8 R: 8  L: 8 R: 8  L: 8 R: 8  L: 8 R: 8  L: 8 R: 6-7  L: 6-7 R: 6  L: 6 R: 5  L: 5     VAS(0-10) 9/28/2017 10/5/2017 10/19/17 11/2/2017 11/9/2017 11/14/17 11/30/17 12/7/2017   At Rest: 3 tingling  R: 3  L: 3 3 tingling  R: 3  L: 3 5 tingling  R: 5  L: 5 7 tingling  R: 7  L: 7 6 tingling  R: 5  L: 5 6 tingling  R: 5  L: 5 6 tingling  R: 5  L: 5 6 tingling  R: 5  L: 5   With Use: R: 5  L: 5 R: 5-6  R elbow 6-8  L: 5-6 R: 7-8  R elbow 3  L: 6 R: 9  R elbow 3  L: 9 R: 8  R elbow 1  L: 8 R: 8  R elbow 1  L: 8 R: 8  R elbow 1  L: 8 R: 8  B elbows 2  L: 8     Report of Pain:  Location:  All fingers  Description:  pain  Frequency:  constant    Duration:  lingers  Exacerbated by:  use  Relieved by:  meds (somewhat)  Progression: improving    Special Tests:   Date 6/1/2017 6/29/2017 9/1/2017 11/30/2017     Left Right Left Right Left Right Left Right     Tinels CT             Tinels PIN             Tinels DSRN             Tinels cubital tunnel             Tinels [place]             Phalen's   + + - + - - - -     Elbow Flexion Test   + + - - - - - +         ULTT ( % of full glide )  Date 6/1/2017 6/8/17 6/20/2017 6/29/2017 9/1/2017 9/14/2017   Median nerve                 Ulnar nerve    Radial nerve  R:  + at 90 degrees arm abd and wrist extended  L:  + at 60 with wrist extended  R:  + at 90 degrees arm abd and wrist extended  L:  + at 60 with wrist extended R:  + at 60 degrees arm abd and wrist straight  L:  + at  60 with wrist extended R:  + at 45 degrees arm abd and wrist straight  L:  + at 45 with wrist extended R:  + at 80 degrees arm abd and wrist straight  L:  + at 80 with wrist extended R:  + at 80 degrees arm abd and wrist straight  L:  + at 60 with wrist extended     Date 9/21/2017 9/28/2017 10/5/2017 11/2/2017 11/9/2017 11/14/2017   Median nerve                   Ulnar nerve    Radial nerve  R:  + at 90 degrees arm abd and wrist extended  L:  + at 70 with wrist extended  R:  + at 90 degrees arm abd and wrist extended  L:  + at 90 with wrist extended  R:  + at 80 degrees arm abd and wrist extended  L:  + at 80 with wrist extended  R:  + at 90 degrees arm abd and wrist neutral  L:  + at 60 with wrist extended R:  + at 90 degrees arm abd and wrist 45 degree extension  L:  + at 90 with wrist extended fully R:  + at 90 degrees arm abd and wrist 45 degree extension  L:  + at 90 with wrist extended fully     Date 11/30/2017 12/7/2017       Median nerve                     Ulnar nerve    Radial nerve  R:  + at 45 degrees arm abd and wrist neutral  L:  + at 90 with wrist neutral R:  Full nerve glide  L:  Full nerve glide           Cervical Screen: (+ or -)  Date 6/1/2017       Active        Flexion -       Extension -       Lateral Flexion  Right -      Lateral Flexion  Left -      Rotation Right heaviness      Rotation Left heaviness      Compression Neck -        TOS Special Tests  Date 6/1/2017 6/29/2017 9/1/2017 11/9/2017 11/30/2017     Left Right Left Right Left Right Left Right Left Right Left Right   Inderjit Test + + + + - + small + + + after 35 sec + after 35 sec     Costoclavicular Test + + + + + after45 sec + after 15 sec small and ring - + after 45 sec + after 20 sec + after 20 sec     Rae's Test               Adson's Test                   Posture:  []              Normal   []             Forward Neck Posture  [x]             Rounded Forward Shoulders  []             Shoulder Height Difference  Comments:     Patient's proximal musculature at shoulder appears imbalanced with tight pectoralis muscles, subscapularis, upper trapezius, lattisimus and weak scapular stabilizers.  This pattern contributes to overuse of distal musculature.    Edema:  [x]             None  []              Mild   []             Moderate  []              Severe     []              of affected part      Sensation: []              WNL throughout all nerve distributions; per patient report    [x]              Decreased  [x]              Median    [x]             Ulnar    []             Radial nerve distribution    []              See Sensory Evaluation    Description:    STRENGTH:   (Measured in pounds)     2017   2017   2017 10/19/2017     Trials Right Left Right Left Right Left  Right Left   1  2  3  Av#-       65#-       65#       68#       70#       71#       65#       75#      2017      Trials Right Left Right Left Right Left  Right Left   1  2  3  Av#       70#             3 pt Pinch 2017     Trials Right Left Right Left Right Left  Right Left   1  2  3  Av#-       17#-       19#       19#           Lateral Pinch 2017       Trials Right Left Right Left Right Left  Right Left   1  2  3  Av#-       21#-       20#       21#           Home Exercise Program:  Foam roller massage and stretch pecs  Icing prn  Proximal median nerve glide  Massage fa and upper arm (bicep) (partner to use roller bar to massage)  Clock pec stretch  Latissimus stretch  FM LEP (bilateral)  Extensor stretches  Table top flexor stretch  tricep stretch  Table top flexor stretch  Foster massage  Epsom salts  Can massage tricep  Core strengthening and Lower trap squeezes with nerve gliding  Trigger massage to outer elbow with flexion / extension of elbow  Neck lateral bend with median nerve glide  Use long dowel to push into tight pecs  Back extension exercises over therapy  ball  Bullet prosource foam roller for deeper massage  Spiky ball massage to flexor wad and pec area    Next Visit:  Laser  Mfr  Stretching  Nerve gliding

## 2018-01-08 DIAGNOSIS — R11.0 NAUSEA: ICD-10-CM

## 2018-01-09 NOTE — TELEPHONE ENCOUNTER
Routing refill request to provider for review/approval because:  Omeprazole last RX'd > 1 yr ago for nausea.     Please review and complete.      Cris PIERCE RN,BSN

## 2018-03-09 ENCOUNTER — THERAPY VISIT (OUTPATIENT)
Dept: OCCUPATIONAL THERAPY | Facility: CLINIC | Age: 48
End: 2018-03-09
Payer: COMMERCIAL

## 2018-03-09 DIAGNOSIS — G56.03 CARPAL TUNNEL SYNDROME, BILATERAL: ICD-10-CM

## 2018-03-09 PROCEDURE — 97110 THERAPEUTIC EXERCISES: CPT | Mod: GO | Performed by: OCCUPATIONAL THERAPIST

## 2018-03-09 PROCEDURE — 97026 INFRARED THERAPY: CPT | Mod: GO | Performed by: OCCUPATIONAL THERAPIST

## 2018-03-09 PROCEDURE — 97140 MANUAL THERAPY 1/> REGIONS: CPT | Mod: GO | Performed by: OCCUPATIONAL THERAPIST

## 2018-03-09 PROCEDURE — 97112 NEUROMUSCULAR REEDUCATION: CPT | Mod: GO | Performed by: OCCUPATIONAL THERAPIST

## 2018-03-09 NOTE — PROGRESS NOTES
Progress Note - Hand Therapy    Current Date:  3/9/2018    Diagnosis:  Bilateral hand pain/weakness  DOI:  October 2015    Number of visits to date:  16    Reporting period is 12/7/2018 to 3/9/2018.    Subjectve:   Subjective changes as noted by patient:  My fatigue and pain really went down when I was at [place].   Functional changes noted by patient:  Improvement in Self Care Tasks (dressing, eating, bathing, hygiene/toileting) and Household Chores  Patient has noted adverse reaction to:  None        Objective:  Pain Level Report  VAS(0-10) 6/1/2017 6/8/2017 6/15/2017 6/20/2017 6/29/2017 9/1/2017 9/14/2017 9/21/2017   At Rest: 7 tingling  R: 6  L: 6 7 tingling  R: 6  L: 6 7 tingling  R: 6  L: 6 7 tingling  R: 6  L: 6 6-7 tingling  R: 5  L: 5 5 tingling  R: 3  L: 3 4 tingling  R: 3  L: 3 3 tingling  R: 3  L: 3   With Use: R: 8  L: 8 R: 8  L: 8 R: 8  L: 8 R: 8  L: 8 R: 8  L: 8 R: 6-7  L: 6-7 R: 6  L: 6 R: 5  L: 5     VAS(0-10) 9/28/2017 10/5/2017 10/19/17 11/2/2017 11/9/2017 11/14/17 11/30/17 12/7/2017   At Rest: 3 tingling  R: 3  L: 3 3 tingling  R: 3  L: 3 5 tingling  R: 5  L: 5 7 tingling  R: 7  L: 7 6 tingling  R: 5  L: 5 6 tingling  R: 5  L: 5 6 tingling  R: 5  L: 5 6 tingling  R: 5  L: 5   With Use: R: 5  L: 5 R: 5-6  R elbow 6-8  L: 5-6 R: 7-8  R elbow 3  L: 6 R: 9  R elbow 3  L: 9 R: 8  R elbow 1  L: 8 R: 8  R elbow 1  L: 8 R: 8  R elbow 1  L: 8 R: 8  B elbows 2  L: 8     VAS(0-10) 3/9/2018          At Rest: 3-4 tingling  R: 3  L: 3          With Use: R: 5  B elbows 0  L: 5              Report of Pain:  Location:  All fingers  Description:  pain  Frequency:  constant    Duration:  lingers  Exacerbated by:  use  Relieved by:  meds (somewhat)  Progression: improving    Special Tests:   Date 6/1/2017 6/29/2017 9/1/2017 11/30/2017 3/9/2018    Left Right Left Right Left Right Left Right Left Right   Tinels CT             Tinels PIN             Tinels DSRN             Tinels cubital tunnel             Tinels  Thomas Hobson             Phalen's   + + - + - - - -     Elbow Flexion Test   + + - - - - - + + R,S + R,S       ULTT ( % of full glide )  Date 6/1/2017 6/8/17 6/20/2017 6/29/2017 9/1/2017 9/14/2017   Median nerve                 Ulnar nerve    Radial nerve  R:  + at 90 degrees arm abd and wrist extended  L:  + at 60 with wrist extended  R:  + at 90 degrees arm abd and wrist extended  L:  + at 60 with wrist extended R:  + at 60 degrees arm abd and wrist straight  L:  + at 60 with wrist extended R:  + at 45 degrees arm abd and wrist straight  L:  + at 45 with wrist extended R:  + at 80 degrees arm abd and wrist straight  L:  + at 80 with wrist extended R:  + at 80 degrees arm abd and wrist straight  L:  + at 60 with wrist extended     Date 9/21/2017 9/28/2017 10/5/2017 11/2/2017 11/9/2017 11/14/2017   Median nerve                   Ulnar nerve    Radial nerve  R:  + at 90 degrees arm abd and wrist extended  L:  + at 70 with wrist extended  R:  + at 90 degrees arm abd and wrist extended  L:  + at 90 with wrist extended  R:  + at 80 degrees arm abd and wrist extended  L:  + at 80 with wrist extended  R:  + at 90 degrees arm abd and wrist neutral  L:  + at 60 with wrist extended R:  + at 90 degrees arm abd and wrist 45 degree extension  L:  + at 90 with wrist extended fully R:  + at 90 degrees arm abd and wrist 45 degree extension  L:  + at 90 with wrist extended fully     Date 11/30/2017 12/7/2017 3/9/2018      Median nerve                     Ulnar nerve    Radial nerve  R:  + at 45 degrees arm abd and wrist neutral  L:  + at 90 with wrist neutral R:  Full nerve glide  L:  Full nerve glide R:  + at 90 degrees arm abd and wrist  Slightly extended  L:  + at 90 with wrist slightly extended          Cervical Screen: (+ or -)  Date 6/1/2017 3/9/2018      Active Active       Flexion - -      Extension - -      Lateral Flexion  Right - -     Lateral Flexion  Left - -     Rotation Right heaviness -     Rotation Left heaviness -      Compression Neck - -       TOS Special Tests  Date 2017 2017 2017 2017 2017 3/9/2018    Left Right Left Right Left Right Left Right Left Right Left Right   Inderjit Test + + + + - + small + + + after 35 sec + after 35 sec - + after 10 sec   Costoclavicular Test + + + + + after45 sec + after 15 sec small and ring - + after 45 sec + after 20 sec + after 20 sec + after 20 sec + after 10 sec   Rae's Test               Adson's Test                   Posture:  []              Normal   []             Forward Neck Posture  [x]             Rounded Forward Shoulders  []             Shoulder Height Difference  Comments:    Patient's proximal musculature at shoulder appears imbalanced with tight pectoralis muscles, subscapularis, upper trapezius, lattisimus and weak scapular stabilizers.  This pattern contributes to overuse of distal musculature.    Edema:  [x]             None  []              Mild   []             Moderate  []              Severe     []              of affected part      Sensation: []              WNL throughout all nerve distributions; per patient report    [x]              Decreased  [x]              Median    [x]             Ulnar    []             Radial nerve distribution    []              See Sensory Evaluation    Description:    STRENGTH:   (Measured in pounds)     2017   2017   2017 10/19/2017     Trials Right Left Right Left Right Left  Right Left   1  2  3  Av#-       65#-       65#       68#       70#       71#       65#       75#      2017 3/9/2018     Trials Right Left Right Left Right Left  Right Left   1  2  3  Av#       70#       70#       65#           3 pt Pinch 2017 2017 3/9/2018    Trials Right Left Right Left Right Left  Right Left   1  2  3  Av#-       17#-       19#       19#       19#       19#         Lateral Pinch 2017   2017   3/9/2018      Trials Right Left Right Left Right  Left  Right Left   1  2  3  Av#-       21#-       20#       21#       17#       19#         Assessment:  Response to therapy has been improvement to:  Pain:  intensity of pain is decreased  Muscle tightness/nerve tension have increased.  Slight decrease in strength.    Overall Assessment:  Patient is ready to progress to more complex exercises.  STG/LTG:  STGoals have been reviewed and progress or achievement has occurred;  see goal sheet for details and updates.    Plan:  Frequency/Duration:  Recommend continuing to see patient 1-2 X a month, once daily per MD order.  Appropriateness of Rx I have re-evaluated this patient and find that the nature, scope, duration and intensity of the therapy is appropriate for the medical condition of the patient.    Recommendations for Continued Therapy  Additions to Treatment Plan -  Back extension over ball    Home Exercise Program:  Foam roller massage and stretch pecs  Icing prn  Proximal median nerve glide  Massage fa and upper arm (bicep) (partner to use roller bar to massage)  Clock pec stretch  Latissimus stretch  FM LEP (bilateral)  Extensor stretches  Table top flexor stretch  tricep stretch  Table top flexor stretch  [place] massage  Epsom salts  Can massage tricep  Core strengthening and Lower trap squeezes with nerve gliding  Trigger massage to outer elbow with flexion / extension of elbow  Neck lateral bend with median nerve glide  Use long dowel to push into tight pecs  Back extension exercises over therapy ball  Bullet prosource foam roller for deeper massage  Spiky ball massage to flexor wad and pec area  Back extension over ball    Next Visit:  Laser  Mfr  Stretching  Nerve gliding

## 2018-03-09 NOTE — MR AVS SNAPSHOT
After Visit Summary   3/9/2018    Maegan Lira    MRN: [id number]           Patient Information     Date Of Birth          1970        Visit Information        Provider Department      3/9/2018 11:00 AM Felicia Summers [facility]        Today's Diagnoses     Carpal tunnel syndrome, bilateral           Follow-ups after your visit        Your next 10 appointments already scheduled     Apr 03, 2018 12:00 PM CDT   CINDY Hand with Felicia Melina   [facility]    [address] [place] [address] [phone]              Who to contact     If you have questions or need follow up information about today's clinic visit or your schedule please contact [facility] directly at [phone].  Normal or non-critical lab and imaging results will be communicated to you by MyChart, letter or phone within 4 business days after the clinic has received the results. If you do not hear from us within 7 days, please contact the clinic through MyChart or phone. If you have a critical or abnormal lab result, we will notify you by phone as soon as possible.  Submit refill requests through [employer] or call your pharmacy and they will forward the refill request to us. Please allow 3 business days for your refill to be completed.          Additional Information About Your Visit        MyChart Information     [employer] gives you secure access to your electronic health record. If you see a primary care provider, you can also send messages to your care team and make appointments. If you have questions, please call your primary care clinic.  If you do not have a primary care provider, please call [phone] and they will assist you.        Care EveryWhere ID     This is your Care EveryWhere ID. This could be used by other organizations to access your [facility] medical records  [id number]         Blood Pressure from Last 3 Encounters:   09/01/17 (!) 140/96    08/15/17 119/79   07/15/17 (!) 140/93    Weight from Last 3 Encounters:   09/01/17 87.1 kg (192 lb)   08/15/17 87.1 kg (192 lb)   07/15/17 88.5 kg (195 lb)              We Performed the Following     INFRARED THERAPY     MANUAL THER TECH,1+REGIONS,EA 15 MIN     NEUROMUSCULAR RE-EDUCATION     THERAPEUTIC EXERCISES        Primary Care Provider Office Phone # Fax #    Jelly Mcarthur,  [phone] [address]  [facility] [address]        Equal Access to Services     [facility]: Hadii aad ku hadasho Soomaali, waaxda luqadaha, qaybta kaalmada adeegyada, felicity ibarra hayrachel paredes . [facility] [phone].    ATENCIÓN: Si habla español, tiene a alejo disposición servicios gratuitos de asistencia lingüística. [facility] [phone].    We comply with applicable federal civil rights laws and [place] laws. We do not discriminate on the basis of race, color, national origin, age, disability, sex, sexual orientation, or gender identity.            Thank you!     Thank you for choosing [facility]  for your care. Our goal is always to provide you with excellent care. Hearing back from our patients is one way we can continue to improve our services. Please take a few minutes to complete the written survey that you may receive in the mail after your visit with us. Thank you!             Your Updated Medication List - Protect others around you: Learn how to safely use, store and throw away your medicines at www.disposemymeds.org.          This list is accurate as of 3/9/18  2:41 PM.  Always use your most recent med list.                   Brand Name Dispense Instructions for use Diagnosis    Butalbital-APAP-Caffeine -40 MG Caps     30 capsule    Take one capsule as needed for migraine. Max one capsule daily. Do not take with opiate pain medications including oxycodone.    Migraine without status migrainosus, not intractable, unspecified migraine type       diazepam 5 MG  tablet    VALIUM    30 tablet    Take 1 tablet (5 mg) by mouth every 6 hours as needed for anxiety, sleep or muscle spasms If you take this medication, do not take any other muscle relaxants.    Fibromyalgia       DULoxetine 30 MG EC capsule    CYMBALTA    90 capsule    TAKE 1 CAPSULE BY MOUTH EVERY DAY    Chronic pain syndrome       hydrOXYzine 25 MG tablet    ATARAX    60 tablet    Take 1-2 tablets (25-50 mg) by mouth every 6 hours as needed for anxiety    Fibromyalgia       naproxen 375 MG tablet    NAPROSYN    60 tablet    Take 1 tablet (375 mg) by mouth 2 times daily as needed for moderate pain    Myofacial muscle pain       omeprazole 20 MG CR capsule    priLOSEC    30 capsule    TAKE 1 CAPSULE BY MOUTH DAILY AS NEEDED    Nausea       ondansetron 4 MG ODT tab    ZOFRAN-ODT    30 tablet    Take 1 tablet (4 mg) by mouth daily as needed for nausea    Nausea       predniSONE 20 MG tablet    DELTASONE    12 tablet    Take 2 tabs (40 mg) daily x 3 days, 1 tab (20 mg) daily x 3 days, then 1/2 tab (10 mg) x 3 days.    Contact dermatitis due to adhesive bandage       senna-docusate 8.6-50 MG per tablet    KATIANA-COLACE    90 tablet    Take 1-2 tablets by mouth 2 times daily as needed for constipation    Constipation, unspecified constipation type       UNABLE TO FIND      Take 10 drops by mouth 2 times daily MEDICATION NAME: CBD Max Hemp Oil

## 2018-03-27 DIAGNOSIS — F41.9 ANXIETY: Primary | ICD-10-CM

## 2018-03-27 DIAGNOSIS — M79.7 FIBROMYALGIA: ICD-10-CM

## 2018-03-27 RX ORDER — HYDROXYZINE HYDROCHLORIDE 25 MG/1
TABLET, FILM COATED ORAL
Qty: 60 TABLET | Refills: 3 | Status: SHIPPED | OUTPATIENT
Start: 2018-03-27 | End: 2018-11-25

## 2018-03-27 NOTE — TELEPHONE ENCOUNTER
Routing refill request to provider for review/approval because:  Diagnosis associated with med listed as Fibromyalgia but in sig noted pt taking for anxiety   Please advise     Jacquelyn CHANCE RN

## 2018-03-27 NOTE — TELEPHONE ENCOUNTER
"hydrOXYzine (ATARAX) 25 MG tablet 60 tablet 3 8/15/2017  No   Sig: Take 1-2 tablets (25-50 mg) by mouth every 6 hours as needed for anxiety     Last Written Prescription Date:  08/15/2017  Last Fill Quantity: 60,  # refills: 3   Last office visit: 9/1/2017 with prescribing provider:     Future Office Visit:   Next 5 appointments (look out 90 days)     Apr 30, 2018 11:30 AM CDT   [facility] with Jelly [facility]ur [facility]    [address] [facility] [address]   [phone]                 Requested Prescriptions   Pending Prescriptions Disp Refills     hydrOXYzine (ATARAX) 25 MG tablet [Pharmacy Med Name: HYDROXYZINE HCL 25MG TABS (WHITE)] 60 tablet 0     Sig: TAKE 1 TO 2 TABLETS(25 TO 50 MG) BY MOUTH EVERY 6 HOURS AS NEEDED FOR ANXIETY    Antihistamines Protocol Passed    3/27/2018 12:55 PM       Passed - Recent (12 mo) or future (30 days) visit within the authorizing provider's specialty    Patient had office visit in the last 12 months or has a visit in the next 30 days with authorizing provider or within the authorizing provider's specialty.  See \"Patient Info\" tab in inbasket, or \"Choose Columns\" in Meds & Orders section of the refill encounter.           Passed - Patient is age 3 or older    Apply age and/or weight-based dosing for peds patients age 3 and older.    Forward request to provider for patients under the age of 3.            "

## 2018-04-09 ENCOUNTER — VIRTUAL VISIT (OUTPATIENT)
Dept: FAMILY MEDICINE | Facility: OTHER | Age: 48
End: 2018-04-09

## 2018-04-09 NOTE — PROGRESS NOTES
"Date:   Clinician: Jax Prajapati  Clinician NPI: [id number]  Patient: Maegan Lira  Patient : 1970  Patient Address: [address] [place] [address]  Patient Phone: [phone]  Visit Protocol: URI  Patient Summary:  Maegan is a 47 year old ( : 1970 ) female who initiated a Visit for cold, sinus infection, or influenza. When asked the question \"Please sign me up to receive news, health information and promotions from [employer].\", Maegan responded \"No\".    Maegan states her symptoms started gradually 10-13 days ago. After her symptoms started, they improved and then got worse again.   Her symptoms consist of chills, malaise, myalgia, facial pain or pressure, a sore throat, a headache, tooth pain, nasal congestion, and enlarged lymph nodes.   Symptom details     Nasal secretions: The color of her mucus is yellow and blood-tinged.    Sore throat: Maegan reports having moderate throat pain (between 4-6 on a 10 point pain scale), does not have exudate on her tonsils, and is able to swallow liquids. The lymph nodes in her neck are enlarged. She states that rashes have not appeared on the skin since the sore throat started.     Facial pain or pressure: The facial pain or pressure feels worse when bending over or leaning forward.     Headache: She states the headache is moderate (between 4-6 on a 10 point pain scale).     Tooth pain: The tooth pain is not caused by a cavity, recent dental work, or other mouth problems.      Maegan denies having wheezing, cough, fever, dyspnea, ear pain, and rhinitis. She also denies having recent facial or sinus surgery in the past 60 days, taking antibiotic medication for the symptoms, and having a sinus infection within the past year.   Within the past week, Maegan has not been exposed to someone with strep throat. She has not recently been exposed to someone with influenza. Maegan has not been in close contact with any high " risk individuals.   Weight: 193 lbs   Maegan does not smoke or use smokeless tobacco.   She denies pregnancy and denies breastfeeding. She has menstruated in the past month.   Additional information as reported by the patient (free text): I have had a history of recurrent sinusitis that has not responded without the addition of oral steriods in the treatment. This information is in my chart. Although I have not had a true sinus infection recently, I think this is what I am suffering from.   MEDICATIONS:      Hydroxyzine HCl oral    Cymbalta oral    , ALLERGIES:       codeine    ampicillin    Keflex      Clinician Response:  Dear Maegan,  Based on the information provided, you have acute bacterial sinusitis, also known as a sinus infection. Sinus infections are caused by bacteria or a virus and symptoms are almost always identical. The difference between the 2 types of infections is timing.  Sinus infections start as viral infections and symptoms improve on their own in about 7 days. If symptoms have not improved after 7 days or have even worsened, a bacterial infection may have developed.  Medication information  I am prescribing:       Fluticasone 50 mcg/actuation nasal spray. Inhale 1-2 sprays in each nostril 1 time per day. This medication takes several days to start working, so keep taking it even if it doesn't help right away. There are no refills with this prescription.      Doxycycline hyclate 100 mg oral tablet. Take 1 tablet by mouth 2 times per day for 7 days. There are no refills with this prescription.     Antibiotics can cause an allergic reaction even if you have taken them without a problem in the past. If you develop a new rash, swelling, or difficulty breathing, stop the medication and be seen in a clinic or urgent care immediately.  A yeast infection is a side effect of taking antibiotics in some women. Please use [facility] to get treatment if you have symptoms of a yeast infection.  If you  become pregnant during this course of treatment, stop taking the medication and contact your primary care provider.  Self care  The following tips will keep you as comfortable as possible while you recover:     Rest    Drink plenty of water and other liquids    Take a hot shower to loosen congestion    Use throat lozenges    Gargle with warm salt water (1/4 teaspoon of salt per 8 ounce glass of water)    Suck on frozen items such as popsicles or ice cubes    Drink hot tea with lemon and honey     When to seek care  Please be seen in a clinic or urgent care if any of the following occur:     Symptoms do not start to improve after 3 days of treatment    New symptoms develop, or symptoms become worse     Call 911 or go to the emergency room if you feel that your throat is closing off, you suddenly develop a rash, you are unable to swallow fluids, you are drooling, or you are having difficulty breathing.   Diagnosis: Acute bacterial sinusitis  Diagnosis ICD: J01.90  Additional Clinician Notes: Unfortunately oral steroids are not a option on [facility].  If you need those please be seen in Urgent Care   Prescription: fluticasone 50 mcg/actuation nasal spray,suspension 1 120 spray aerosol with adapter (grams), 30 days supply. Inhale 1-2 sprays in each nostril 1 time per day. Refills: 0, Refill as needed: no, Allow substitutions: yes  Prescription: doxycycline hyclate 100 mg oral tablet 14 tablet, 7 days supply. Take 1 tablet by mouth 2 times per day for 7 days. Refills: 0, Refill as needed: no, Allow substitutions: yes  Pharmacy: [id number] - [phone] - [address] [place] [address]

## 2018-04-12 ENCOUNTER — THERAPY VISIT (OUTPATIENT)
Dept: OCCUPATIONAL THERAPY | Facility: CLINIC | Age: 48
End: 2018-04-12
Payer: COMMERCIAL

## 2018-04-12 DIAGNOSIS — G56.03 CARPAL TUNNEL SYNDROME, BILATERAL: ICD-10-CM

## 2018-04-12 PROCEDURE — 97112 NEUROMUSCULAR REEDUCATION: CPT | Mod: GO | Performed by: OCCUPATIONAL THERAPIST

## 2018-04-12 PROCEDURE — 97110 THERAPEUTIC EXERCISES: CPT | Mod: GO | Performed by: OCCUPATIONAL THERAPIST

## 2018-04-12 PROCEDURE — 97140 MANUAL THERAPY 1/> REGIONS: CPT | Mod: GO | Performed by: OCCUPATIONAL THERAPIST

## 2018-04-12 PROCEDURE — 97026 INFRARED THERAPY: CPT | Mod: GO | Performed by: OCCUPATIONAL THERAPIST

## 2018-04-12 NOTE — PROGRESS NOTES
Progress Note - Hand Therapy    Current Date:  4/12/2018    Diagnosis:  Bilateral hand pain/weakness  DOI:  October 2015    Number of visits to date:  17    Reporting period is 3/9/2018 to 4/12/2018    Subjectve:   Subjective changes as noted by patient:  I am a steroid and antibiotics for a sinus infection.  Not sleeping well.  Paresthesias a little better.  Functional changes noted by patient:  Improvement in Self Care Tasks (dressing, eating, bathing, hygiene/toileting) and Household Chores  Patient has noted adverse reaction to:  None        Objective:  Pain Level Report  VAS(0-10) 6/1/2017 6/8/2017 6/15/2017 6/20/2017 6/29/2017 9/1/2017 9/14/2017 9/21/2017   At Rest: 7 tingling  R: 6  L: 6 7 tingling  R: 6  L: 6 7 tingling  R: 6  L: 6 7 tingling  R: 6  L: 6 6-7 tingling  R: 5  L: 5 5 tingling  R: 3  L: 3 4 tingling  R: 3  L: 3 3 tingling  R: 3  L: 3   With Use: R: 8  L: 8 R: 8  L: 8 R: 8  L: 8 R: 8  L: 8 R: 8  L: 8 R: 6-7  L: 6-7 R: 6  L: 6 R: 5  L: 5     VAS(0-10) 9/28/2017 10/5/2017 10/19/17 11/2/2017 11/9/2017 11/14/17 11/30/17 12/7/2017   At Rest: 3 tingling  R: 3  L: 3 3 tingling  R: 3  L: 3 5 tingling  R: 5  L: 5 7 tingling  R: 7  L: 7 6 tingling  R: 5  L: 5 6 tingling  R: 5  L: 5 6 tingling  R: 5  L: 5 6 tingling  R: 5  L: 5   With Use: R: 5  L: 5 R: 5-6  R elbow 6-8  L: 5-6 R: 7-8  R elbow 3  L: 6 R: 9  R elbow 3  L: 9 R: 8  R elbow 1  L: 8 R: 8  R elbow 1  L: 8 R: 8  R elbow 1  L: 8 R: 8  B elbows 2  L: 8     VAS(0-10) 3/9/2018 4/12/2018         At Rest: 3-4 tingling  R: 3  L: 3 4 tingling  R: 5  L: 3         With Use: R: 5  B elbows 0  L: 5 R: 5  B elbows 0  L: 5             Report of Pain:  Location:  All fingers  Description:  pain  Frequency:  constant    Duration:  lingers  Exacerbated by:  use  Relieved by:  meds (somewhat)  Progression: improving    Special Tests:   Date 6/1/2017 6/29/2017 9/1/2017 11/30/2017 3/9/2018    Left Right Left Right Left Right Left Right Left Right   Tinels CT              Tinels PIN             Tinels DSRN             Tinels cubital tunnel             Tinels [place]             Phalen's   + + - + - - - -     Elbow Flexion Test   + + - - - - - + + R,S + R,S     Date 4/12/2018        Left Right Left Right Left Right Left Right Left Right   Tinels CT             Tinels PIN             Tinels DSRN             Tinels cubital tunnel             Tinels [place]             Phalen's               Elbow Flexion Test                   ULTT ( % of full glide )  Date 6/1/2017 6/8/17 6/20/2017 6/29/2017 9/1/2017 9/14/2017   Median nerve                 Ulnar nerve    Radial nerve  R:  + at 90 degrees arm abd and wrist extended  L:  + at 60 with wrist extended  R:  + at 90 degrees arm abd and wrist extended  L:  + at 60 with wrist extended R:  + at 60 degrees arm abd and wrist straight  L:  + at 60 with wrist extended R:  + at 45 degrees arm abd and wrist straight  L:  + at 45 with wrist extended R:  + at 80 degrees arm abd and wrist straight  L:  + at 80 with wrist extended R:  + at 80 degrees arm abd and wrist straight  L:  + at 60 with wrist extended     Date 9/21/2017 9/28/2017 10/5/2017 11/2/2017 11/9/2017 11/14/2017   Median nerve                   Ulnar nerve    Radial nerve  R:  + at 90 degrees arm abd and wrist extended  L:  + at 70 with wrist extended  R:  + at 90 degrees arm abd and wrist extended  L:  + at 90 with wrist extended  R:  + at 80 degrees arm abd and wrist extended  L:  + at 80 with wrist extended  R:  + at 90 degrees arm abd and wrist neutral  L:  + at 60 with wrist extended R:  + at 90 degrees arm abd and wrist 45 degree extension  L:  + at 90 with wrist extended fully R:  + at 90 degrees arm abd and wrist 45 degree extension  L:  + at 90 with wrist extended fully     Date 11/30/2017 12/7/2017 3/9/2018 4/12/2018     Median nerve                     Ulnar nerve    Radial nerve  R:  + at 45 degrees arm abd and wrist neutral  L:  + at 90 with wrist neutral R:   Full nerve glide  L:  Full nerve glide R:  + at 90 degrees arm abd and wrist  Slightly extended  L:  + at 90 with wrist slightly extended R:  + at 90 degrees arm abd and wrist  neutral  L:  + at 70 with wrist neutral         Cervical Screen: (+ or -)  Date 2017 3/9/2018      Active Active       Flexion - -      Extension - -      Lateral Flexion  Right - -     Lateral Flexion  Left - -     Rotation Right heaviness -     Rotation Left heaviness -     Compression Neck - -       TOS Special Tests  Date 2017 2017 2017 2017 2017 3/9/2018    Left Right Left Right Left Right Left Right Left Right Left Right   Inderjit Test + + + + - + small + + + after 35 sec + after 35 sec - + after 10 sec   Costoclavicular Test + + + + + after45 sec + after 15 sec small and ring - + after 45 sec + after 20 sec + after 20 sec + after 20 sec + after 10 sec   Rae's Test               Adson's Test                   Posture:  []              Normal   []             Forward Neck Posture  [x]             Rounded Forward Shoulders  []             Shoulder Height Difference  Comments:    Patient's proximal musculature at shoulder appears imbalanced with tight pectoralis muscles, subscapularis, upper trapezius, lattisimus and weak scapular stabilizers.  This pattern contributes to overuse of distal musculature.    Edema:  [x]             None  []              Mild   []             Moderate  []              Severe     []              of affected part      Sensation: []              WNL throughout all nerve distributions; per patient report    [x]              Decreased  [x]              Median    [x]             Ulnar    []             Radial nerve distribution    []              See Sensory Evaluation    Description:    STRENGTH:   (Measured in pounds)     2017   2017   2017 10/19/2017     Trials Right Left Right Left Right Left  Right Left   1  2  3  Av#-       65#-       65#       68#        70#       71#       65#       75#      2017 3/9/2018 2018    Trials Right Left Right Left Right Left  Right Left   1  2  3  Av#       70#       70#       65#       70#       65#         3 pt Pinch 2017 2017 3/9/2018 2018   Trials Right Left Right Left Right Left  Right Left   1  2  3  Av#-       17#-       19#       19#       19#       19#       19#       19#       Lateral Pinch 2017   2017   3/9/2018   2018     Trials Right Left Right Left Right Left  Right Left   1  2  3  Av#-       21#-       20#       21#       17#       19#       17#       19#       Assessment:  Response to therapy has been worsening of Pain:  intensity of pain is increased  Muscle tightness/nerve tension have increased.  No change in strength.    Overall Assessment:  Patient is continue with current treatment plan.  STG/LTG:  STGoals have been reviewed and progress or achievement has occurred;  see goal sheet for details and updates.    Plan:  Frequency/Duration:  Recommend continuing to see patient 1-2 X a month, once daily per MD order.  Appropriateness of Rx I have re-evaluated this patient and find that the nature, scope, duration and intensity of the therapy is appropriate for the medical condition of the patient.    Recommendations for Continued Therapy      Home Exercise Program:  Foam roller massage and stretch pecs  Icing prn  Proximal median nerve glide  Massage fa and upper arm (bicep) (partner to use roller bar to massage)  Clock pec stretch  Latissimus stretch  FM LEP (bilateral)  Extensor stretches  Table top flexor stretch  tricep stretch  Table top flexor stretch  Barrington massage  Epsom salts  Can massage tricep  Core strengthening and Lower trap squeezes with nerve gliding  Trigger massage to outer elbow with flexion / extension of elbow  Neck lateral bend with median nerve glide  Use long dowel to push into tight pecs  Back extension exercises  over therapy ball  Bullet prosource foam roller for deeper massage  Spiky ball massage to flexor wad and pec area  Back extension over ball    Next Visit:  Laser  Mfr  Stretching  Nerve gliding

## 2018-04-12 NOTE — MR AVS SNAPSHOT
After Visit Summary   4/12/2018    Maegan Lira    MRN: [id number]           Patient Information     Date Of Birth          1970        Visit Information        Provider Department      4/12/2018 12:00 PM Felicia Summers [facility]        Today's Diagnoses     Carpal tunnel syndrome, bilateral           Follow-ups after your visit        Your next 10 appointments already scheduled     Apr 27, 2018  1:00 PM CDT   CINDY Hand with Felicia Melina   [facility] ([facility])    [address] [place] [id number] [phone]            Apr 30, 2018 11:30 AM CDT   PHYSICAL with Jelly Mcarthur, DO   [facility] ([facility])    [address] [place] [id number] [phone]              Who to contact     If you have questions or need follow up information about today's clinic visit or your schedule please contact [facility] directly at [phone].  Normal or non-critical lab and imaging results will be communicated to you by [email or web address], letter or phone within 4 business days after the clinic has received the results. If you do not hear from us within 7 days, please contact the clinic through Neurelist or phone. If you have a critical or abnormal lab result, we will notify you by phone as soon as possible.  Submit refill requests through Viptable or call your pharmacy and they will forward the refill request to us. Please allow 3 business days for your refill to be completed.          Additional Information About Your Visit        [email or web address] Information     Viptable gives you secure access to your electronic health record. If you see a primary care provider, you can also send messages to your care team and make appointments. If you have questions, please call your primary care clinic.  If you do not have a primary care provider, please call [phone] and they will assist you.        Care EveryWhere ID     This is your  Care EveryWhere ID. This could be used by other organizations to access your [place] medical records  [id number]         Blood Pressure from Last 3 Encounters:   09/01/17 (!) 140/96   08/15/17 119/79   07/15/17 (!) 140/93    Weight from Last 3 Encounters:   09/01/17 87.1 kg (192 lb)   08/15/17 87.1 kg (192 lb)   07/15/17 88.5 kg (195 lb)              We Performed the Following     CINDY PROGRESS NOTES REPORT     INFRARED THERAPY     MANUAL THER TECH,1+REGIONS,EA 15 MIN     NEUROMUSCULAR RE-EDUCATION     THERAPEUTIC EXERCISES        Primary Care Provider Office Phone # Fax #    Jelly Mcarthur, [phone] [address] [facility] [address]  [facility] [address]        Equal Access to Services     [facility] : Hadii aad ku hadasho Soomaali, waaxda luqadaha, qaybta kaalmada adeegyada, waxay stacey hayrachel paredes . So [facility] [phone].    ATENCIÓN: Si habla español, tiene a alejo disposición servicios gratuitos de asistencia lingüística. Mirame al [phone].    We comply with applicable federal civil rights laws and [place] laws. We do not discriminate on the basis of race, color, national origin, age, disability, sex, sexual orientation, or gender identity.            Thank you!     Thank you for choosing [facility]  for your care. Our goal is always to provide you with excellent care. Hearing back from our patients is one way we can continue to improve our services. Please take a few minutes to complete the written survey that you may receive in the mail after your visit with us. Thank you!             Your Updated Medication List - Protect others around you: Learn how to safely use, store and throw away your medicines at www.disposemymeds.org.          This list is accurate as of 4/12/18  1:09 PM.  Always use your most recent med list.                   Brand Name Dispense Instructions for use Diagnosis    Butalbital-APAP-Caffeine -40 MG Caps     30 capsule    Take one capsule  as needed for migraine. Max one capsule daily. Do not take with opiate pain medications including oxycodone.    Migraine without status migrainosus, not intractable, unspecified migraine type       diazepam 5 MG tablet    VALIUM    30 tablet    Take 1 tablet (5 mg) by mouth every 6 hours as needed for anxiety, sleep or muscle spasms If you take this medication, do not take any other muscle relaxants.    Fibromyalgia       DULoxetine 30 MG EC capsule    CYMBALTA    90 capsule    TAKE 1 CAPSULE BY MOUTH EVERY DAY    Chronic pain syndrome       hydrOXYzine 25 MG tablet    ATARAX    60 tablet    TAKE 1 TO 2 TABLETS(25 TO 50 MG) BY MOUTH EVERY 6 HOURS AS NEEDED FOR ANXIETY    Anxiety       naproxen 375 MG tablet    NAPROSYN    60 tablet    Take 1 tablet (375 mg) by mouth 2 times daily as needed for moderate pain    Myofacial muscle pain       omeprazole 20 MG CR capsule    priLOSEC    30 capsule    TAKE 1 CAPSULE BY MOUTH DAILY AS NEEDED    Nausea       ondansetron 4 MG ODT tab    ZOFRAN-ODT    30 tablet    Take 1 tablet (4 mg) by mouth daily as needed for nausea    Nausea       predniSONE 20 MG tablet    DELTASONE    12 tablet    Take 2 tabs (40 mg) daily x 3 days, 1 tab (20 mg) daily x 3 days, then 1/2 tab (10 mg) x 3 days.    Contact dermatitis due to adhesive bandage       senna-docusate 8.6-50 MG per tablet    KATIANA-COLACE    90 tablet    Take 1-2 tablets by mouth 2 times daily as needed for constipation    Constipation, unspecified constipation type       UNABLE TO FIND      Take 10 drops by mouth 2 times daily MEDICATION NAME: CBD Max Hemp Oil

## 2018-04-27 ENCOUNTER — THERAPY VISIT (OUTPATIENT)
Dept: OCCUPATIONAL THERAPY | Facility: CLINIC | Age: 48
End: 2018-04-27
Payer: COMMERCIAL

## 2018-04-27 DIAGNOSIS — G56.03 CARPAL TUNNEL SYNDROME, BILATERAL: ICD-10-CM

## 2018-04-27 PROCEDURE — 97112 NEUROMUSCULAR REEDUCATION: CPT | Mod: GO | Performed by: OCCUPATIONAL THERAPIST

## 2018-04-27 PROCEDURE — 97110 THERAPEUTIC EXERCISES: CPT | Mod: GO | Performed by: OCCUPATIONAL THERAPIST

## 2018-04-27 PROCEDURE — 97140 MANUAL THERAPY 1/> REGIONS: CPT | Mod: GO | Performed by: OCCUPATIONAL THERAPIST

## 2018-04-27 NOTE — MR AVS SNAPSHOT
After Visit Summary   4/27/2018    Maegan Lira    MRN: [id number]           Patient Information     Date Of Birth          1970        Visit Information        Provider Department      4/27/2018 1:00 PM Felicia Summers [facility]        Today's Diagnoses     Carpal tunnel syndrome, bilateral           Follow-ups after your visit        Your next 10 appointments already scheduled     Apr 30, 2018 11:30 AM CDT   PHYSICAL with Jelly Mcarthur,    [facility] ([facility])    [address] [facility] [id number] [phone]            May 10, 2018 12:30 PM CDT   CINDY Hand with Felicia Melina   [facility] ([facility])    [address] [phone] [phone]              Who to contact     If you have questions or need follow up information about today's clinic visit or your schedule please contact [facility] directly at [phone].  Normal or non-critical lab and imaging results will be communicated to you by ZikBithart, letter or phone within 4 business days after the clinic has received the results. If you do not hear from us within 7 days, please contact the clinic through Betaspringt or phone. If you have a critical or abnormal lab result, we will notify you by phone as soon as possible.  Submit refill requests through [email or web address] or call your pharmacy and they will forward the refill request to us. Please allow 3 business days for your refill to be completed.          Additional Information About Your Visit        ZikBithart Information     [email or web address] gives you secure access to your electronic health record. If you see a primary care provider, you can also send messages to your care team and make appointments. If you have questions, please call your primary care clinic.  If you do not have a primary care provider, please call [phone] and they will assist you.        Care EveryWhere ID     This is your  Care EveryWhere ID. This could be used by other organizations to access your [place] medical records  [id number]         Blood Pressure from Last 3 Encounters:   09/01/17 (!) 140/96   08/15/17 119/79   07/15/17 (!) 140/93    Weight from Last 3 Encounters:   09/01/17 87.1 kg (192 lb)   08/15/17 87.1 kg (192 lb)   07/15/17 88.5 kg (195 lb)              We Performed the Following     CINDY PROGRESS NOTES REPORT     MANUAL THER TECH,1+REGIONS,EA 15 MIN     NEUROMUSCULAR RE-EDUCATION     THERAPEUTIC EXERCISES        Primary Care Provider Office Phone # Fax #    Jelly Mcarthur, [phone] [address] [facility] [address]  [facility] [address]        Equal Access to Services     KENYON WILKS : Hadii aad ku hadasho Soomaali, waaxda luqadaha, qaybta kaalmada adeegyada, waxay stacey hayrachel paredes . So [facility] [phone].    ATENCIÓN: Si habla español, tiene a alejo disposición servicios gratuitos de asistencia lingüística. Llame al [phone].    We comply with applicable federal civil rights laws and [place] laws. We do not discriminate on the basis of race, color, national origin, age, disability, sex, sexual orientation, or gender identity.            Thank you!     Thank you for choosing [facility]  for your care. Our goal is always to provide you with excellent care. Hearing back from our patients is one way we can continue to improve our services. Please take a few minutes to complete the written survey that you may receive in the mail after your visit with us. Thank you!             Your Updated Medication List - Protect others around you: Learn how to safely use, store and throw away your medicines at www.disposemymeds.org.          This list is accurate as of 4/27/18  2:16 PM.  Always use your most recent med list.                   Brand Name Dispense Instructions for use Diagnosis    Butalbital-APAP-Caffeine -40 MG Caps     30 capsule    Take one capsule as needed for  migraine. Max one capsule daily. Do not take with opiate pain medications including oxycodone.    Migraine without status migrainosus, not intractable, unspecified migraine type       diazepam 5 MG tablet    VALIUM    30 tablet    Take 1 tablet (5 mg) by mouth every 6 hours as needed for anxiety, sleep or muscle spasms If you take this medication, do not take any other muscle relaxants.    Fibromyalgia       DULoxetine 30 MG EC capsule    CYMBALTA    90 capsule    TAKE 1 CAPSULE BY MOUTH EVERY DAY    Chronic pain syndrome       hydrOXYzine 25 MG tablet    ATARAX    60 tablet    TAKE 1 TO 2 TABLETS(25 TO 50 MG) BY MOUTH EVERY 6 HOURS AS NEEDED FOR ANXIETY    Anxiety       naproxen 375 MG tablet    NAPROSYN    60 tablet    Take 1 tablet (375 mg) by mouth 2 times daily as needed for moderate pain    Myofacial muscle pain       omeprazole 20 MG CR capsule    priLOSEC    30 capsule    TAKE 1 CAPSULE BY MOUTH DAILY AS NEEDED    Nausea       ondansetron 4 MG ODT tab    ZOFRAN-ODT    30 tablet    Take 1 tablet (4 mg) by mouth daily as needed for nausea    Nausea       predniSONE 20 MG tablet    DELTASONE    12 tablet    Take 2 tabs (40 mg) daily x 3 days, 1 tab (20 mg) daily x 3 days, then 1/2 tab (10 mg) x 3 days.    Contact dermatitis due to adhesive bandage       senna-docusate 8.6-50 MG per tablet    KATIANA-COLACE    90 tablet    Take 1-2 tablets by mouth 2 times daily as needed for constipation    Constipation, unspecified constipation type       UNABLE TO FIND      Take 10 drops by mouth 2 times daily MEDICATION NAME: CBD Max Hemp Oil

## 2018-04-27 NOTE — PROGRESS NOTES
Brief Progress/SOAP note objective information for 4/27/2018.    S:  Arms overall feel better. I can do my full nerve glide now.  O:    Pain Level Report  VAS(0-10) 6/1/2017 6/8/2017 6/15/2017 6/20/2017 6/29/2017 9/1/2017 9/14/2017 9/21/2017   At Rest: 7 tingling  R: 6  L: 6 7 tingling  R: 6  L: 6 7 tingling  R: 6  L: 6 7 tingling  R: 6  L: 6 6-7 tingling  R: 5  L: 5 5 tingling  R: 3  L: 3 4 tingling  R: 3  L: 3 3 tingling  R: 3  L: 3   With Use: R: 8  L: 8 R: 8  L: 8 R: 8  L: 8 R: 8  L: 8 R: 8  L: 8 R: 6-7  L: 6-7 R: 6  L: 6 R: 5  L: 5     VAS(0-10) 9/28/2017 10/5/2017 10/19/17 11/2/2017 11/9/2017 11/14/17 11/30/17 12/7/2017   At Rest: 3 tingling  R: 3  L: 3 3 tingling  R: 3  L: 3 5 tingling  R: 5  L: 5 7 tingling  R: 7  L: 7 6 tingling  R: 5  L: 5 6 tingling  R: 5  L: 5 6 tingling  R: 5  L: 5 6 tingling  R: 5  L: 5   With Use: R: 5  L: 5 R: 5-6  R elbow 6-8  L: 5-6 R: 7-8  R elbow 3  L: 6 R: 9  R elbow 3  L: 9 R: 8  R elbow 1  L: 8 R: 8  R elbow 1  L: 8 R: 8  R elbow 1  L: 8 R: 8  B elbows 2  L: 8     VAS(0-10) 3/9/2018 4/12/2018 4/27/2018        At Rest: 3-4 tingling  R: 3  L: 3 4 tingling  R: 5  L: 3 0-1 tingling  R: 1  L: 1        With Use: R: 5  B elbows 0  L: 5 R: 5  B elbows 0  L: 5 R: 0-1  B elbows 0  L: 1-2            Report of Pain:  Location:  All fingers  Description:  pain  Frequency:  constant    Duration:  lingers  Exacerbated by:  use  Relieved by:  meds (somewhat)  Progression: improving    Special Tests:   Date 6/1/2017 6/29/2017 9/1/2017 11/30/2017 3/9/2018    Left Right Left Right Left Right Left Right Left Right   Tinels CT             Tinels PIN             Tinels DSRN             Tinels cubital tunnel             Tinels Guyons Canal             Phalen's   + + - + - - - -     Elbow Flexion Test   + + - - - - - + + R,S + R,S     Date         Left Right Left Right Left Right Left Right Left Right   Tinels CT             Tinels PIN             Tinels DSRN             Tinels cubital tunnel              Tinels Gurudis Canal             Phalen's               Elbow Flexion Test                   ULTT ( % of full glide )  Date 6/1/2017 6/8/17 6/20/2017 6/29/2017 9/1/2017 9/14/2017   Median nerve                 Ulnar nerve    Radial nerve  R:  + at 90 degrees arm abd and wrist extended  L:  + at 60 with wrist extended  R:  + at 90 degrees arm abd and wrist extended  L:  + at 60 with wrist extended R:  + at 60 degrees arm abd and wrist straight  L:  + at 60 with wrist extended R:  + at 45 degrees arm abd and wrist straight  L:  + at 45 with wrist extended R:  + at 80 degrees arm abd and wrist straight  L:  + at 80 with wrist extended R:  + at 80 degrees arm abd and wrist straight  L:  + at 60 with wrist extended     Date 9/21/2017 9/28/2017 10/5/2017 11/2/2017 11/9/2017 11/14/2017   Median nerve                   Ulnar nerve    Radial nerve  R:  + at 90 degrees arm abd and wrist extended  L:  + at 70 with wrist extended  R:  + at 90 degrees arm abd and wrist extended  L:  + at 90 with wrist extended  R:  + at 80 degrees arm abd and wrist extended  L:  + at 80 with wrist extended  R:  + at 90 degrees arm abd and wrist neutral  L:  + at 60 with wrist extended R:  + at 90 degrees arm abd and wrist 45 degree extension  L:  + at 90 with wrist extended fully R:  + at 90 degrees arm abd and wrist 45 degree extension  L:  + at 90 with wrist extended fully     Date 11/30/2017 12/7/2017 3/9/2018 4/12/2018 4/27/2018    Median nerve                     Ulnar nerve    Radial nerve  R:  + at 45 degrees arm abd and wrist neutral  L:  + at 90 with wrist neutral R:  Full nerve glide  L:  Full nerve glide R:  + at 90 degrees arm abd and wrist  Slightly extended  L:  + at 90 with wrist slightly extended R:  + at 90 degrees arm abd and wrist  neutral  L:  + at 70 with wrist neutral R:  + at 90 degrees arm abd and wrist  Fully extended  L:  + at 90 with wrist fully extended        Cervical Screen: (+ or -)  Date 6/1/2017 3/9/2018       Active Active       Flexion - -      Extension - -      Lateral Flexion  Right - -     Lateral Flexion  Left - -     Rotation Right heaviness -     Rotation Left heaviness -     Compression Neck - -       TOS Special Tests  Date 2017 2017 2017 2017 2017 3/9/2018    Left Right Left Right Left Right Left Right Left Right Left Right   Inderjit Test + + + + - + small + + + after 35 sec + after 35 sec - + after 10 sec   Costoclavicular Test + + + + + after45 sec + after 15 sec small and ring - + after 45 sec + after 20 sec + after 20 sec + after 20 sec + after 10 sec   Rae's Test               Adson's Test                   Posture:  []              Normal   []             Forward Neck Posture  [x]             Rounded Forward Shoulders  []             Shoulder Height Difference  Comments:    Patient's proximal musculature at shoulder appears imbalanced with tight pectoralis muscles, subscapularis, upper trapezius, lattisimus and weak scapular stabilizers.  This pattern contributes to overuse of distal musculature.    Edema:  [x]             None  []              Mild   []             Moderate  []              Severe     []              of affected part      Sensation: []              WNL throughout all nerve distributions; per patient report    [x]              Decreased  [x]              Median    [x]             Ulnar    []             Radial nerve distribution    []              See Sensory Evaluation    Description:    STRENGTH:   (Measured in pounds)     2017   2017   2017 10/19/2017     Trials Right Left Right Left Right Left  Right Left   1  2  3  Av#-       65#-       65#       68#       70#       71#       65#       75#      2017 3/9/2018 2018    Trials Right Left Right Left Right Left  Right Left   1  2  3  Av#       70#       70#       65#       70#       65#         3 pt Pinch 2017 2017 3/9/2018 2018   Trials Right  Left Right Left Right Left  Right Left   1  2  3  Av#-       17#-       19#       19#       19#       19#       19#       19#       Lateral Pinch 2017   2017   3/9/2018   2018     Trials Right Left Right Left Right Left  Right Left   1  2  3  Av#-       21#-       20#       21#       17#       19#       17#       19#     Assessment:  Patient overall more functional with daily tasks.  Able to use hands with less pain and numbness and tingling.    Plan:  Recommend continuing to decrease treatment session to 1x every 3-4 weeks for 3-6 visits.    Home Exercise Program:  Foam roller massage and stretch pecs  Icing prn  Proximal median nerve glide  Massage fa and upper arm (bicep) (partner to use roller bar to massage)  Clock pec stretch  Latissimus stretch  FM LEP (bilateral)  Extensor stretches  Table top flexor stretch  tricep stretch  Table top flexor stretch  [place] massage  Epsom salts  Can massage tricep  Core strengthening and Lower trap squeezes with nerve gliding  Trigger massage to outer elbow with flexion / extension of elbow  Neck lateral bend with median nerve glide  Use long dowel to push into tight pecs  Back extension exercises over therapy ball  Bullet prosource foam roller for deeper massage  Spiky ball massage to flexor wad and pec area  Back extension over ball    Next Visit:  Laser  Mfr  Stretching  Nerve gliding   Posture and back strengthening

## 2018-04-30 ENCOUNTER — OFFICE VISIT (OUTPATIENT)
Dept: FAMILY MEDICINE | Facility: CLINIC | Age: 48
End: 2018-04-30
Payer: COMMERCIAL

## 2018-04-30 VITALS
TEMPERATURE: 99.7 F | OXYGEN SATURATION: 96 % | HEART RATE: 82 BPM | WEIGHT: 196.9 LBS | SYSTOLIC BLOOD PRESSURE: 135 MMHG | DIASTOLIC BLOOD PRESSURE: 89 MMHG | HEIGHT: 63 IN | BODY MASS INDEX: 34.89 KG/M2

## 2018-04-30 DIAGNOSIS — E66.9 NON MORBID OBESITY: ICD-10-CM

## 2018-04-30 DIAGNOSIS — F33.42 RECURRENT MAJOR DEPRESSIVE DISORDER, IN FULL REMISSION (H): ICD-10-CM

## 2018-04-30 DIAGNOSIS — H53.9 VISUAL DISTURBANCE: ICD-10-CM

## 2018-04-30 DIAGNOSIS — Z86.0101 HISTORY OF ADENOMATOUS POLYP OF COLON: ICD-10-CM

## 2018-04-30 DIAGNOSIS — R20.2 PARESTHESIA OF BOTH HANDS: ICD-10-CM

## 2018-04-30 DIAGNOSIS — Z00.01 ENCOUNTER FOR PREVENTATIVE ADULT HEALTH CARE EXAM WITH ABNORMAL FINDINGS: Primary | ICD-10-CM

## 2018-04-30 DIAGNOSIS — Z12.4 SCREENING FOR MALIGNANT NEOPLASM OF CERVIX: ICD-10-CM

## 2018-04-30 DIAGNOSIS — M79.7 FIBROMYALGIA: ICD-10-CM

## 2018-04-30 LAB
CRP SERPL-MCNC: <2.9 MG/L (ref 0–8)
ERYTHROCYTE [SEDIMENTATION RATE] IN BLOOD BY WESTERGREN METHOD: 6 MM/H (ref 0–20)

## 2018-04-30 PROCEDURE — G0145 SCR C/V CYTO,THINLAYER,RESCR: HCPCS | Performed by: INTERNAL MEDICINE

## 2018-04-30 PROCEDURE — 86140 C-REACTIVE PROTEIN: CPT | Performed by: INTERNAL MEDICINE

## 2018-04-30 PROCEDURE — 87624 HPV HI-RISK TYP POOLED RSLT: CPT | Performed by: INTERNAL MEDICINE

## 2018-04-30 PROCEDURE — 36415 COLL VENOUS BLD VENIPUNCTURE: CPT | Performed by: INTERNAL MEDICINE

## 2018-04-30 PROCEDURE — 99396 PREV VISIT EST AGE 40-64: CPT | Performed by: INTERNAL MEDICINE

## 2018-04-30 PROCEDURE — 85652 RBC SED RATE AUTOMATED: CPT | Performed by: INTERNAL MEDICINE

## 2018-04-30 RX ORDER — DIAZEPAM 5 MG
5 TABLET ORAL EVERY 6 HOURS PRN
Qty: 30 TABLET | Refills: 0 | Status: SHIPPED | OUTPATIENT
Start: 2018-04-30 | End: 2019-05-29

## 2018-04-30 ASSESSMENT — ANXIETY QUESTIONNAIRES
1. FEELING NERVOUS, ANXIOUS, OR ON EDGE: NOT AT ALL
7. FEELING AFRAID AS IF SOMETHING AWFUL MIGHT HAPPEN: NOT AT ALL
2. NOT BEING ABLE TO STOP OR CONTROL WORRYING: NOT AT ALL
IF YOU CHECKED OFF ANY PROBLEMS ON THIS QUESTIONNAIRE, HOW DIFFICULT HAVE THESE PROBLEMS MADE IT FOR YOU TO DO YOUR WORK, TAKE CARE OF THINGS AT HOME, OR GET ALONG WITH OTHER PEOPLE: NOT DIFFICULT AT ALL
6. BECOMING EASILY ANNOYED OR IRRITABLE: NOT AT ALL
GAD7 TOTAL SCORE: 1
3. WORRYING TOO MUCH ABOUT DIFFERENT THINGS: NOT AT ALL
5. BEING SO RESTLESS THAT IT IS HARD TO SIT STILL: NOT AT ALL

## 2018-04-30 ASSESSMENT — PATIENT HEALTH QUESTIONNAIRE - PHQ9: 5. POOR APPETITE OR OVEREATING: SEVERAL DAYS

## 2018-04-30 NOTE — LETTER
My Depression Action Plan  Name: Maegan Lira   Date of Birth 1970  Date: 4/30/2018    My doctor: Jelly Mcarthur   My clinic: [place] [address] [address] [facility] [id number] [phone]          GREEN    ZONE   Good Control    What it looks like:     Things are going generally well. You have normal up s and down s. You may even feel depressed from time to time, but bad moods usually last less than a day.   What you need to do:  1. Continue to care for yourself (see self care plan)  2. Check your depression survival kit and update it as needed  3. Follow your physician s recommendations including any medication.  4. Do not stop taking medication unless you consult with your physician first.           YELLOW         ZONE Getting Worse    What it looks like:     Depression is starting to interfere with your life.     It may be hard to get out of bed; you may be starting to isolate yourself from others.    Symptoms of depression are starting to last most all day and this has happened for several days.     You may have suicidal thoughts but they are not constant.   What you need to do:     1. Call your care team, your response to treatment will improve if you keep your care team informed of your progress. Yellow periods are signs an adjustment may need to be made.     2. Continue your self-care, even if you have to fake it!    3. Talk to someone in your support network    4. Open up your depression survival kit           RED    ZONE Medical Alert - Get Help    What it looks like:     Depression is seriously interfering with your life.     You may experience these or other symptoms: You can t get out of bed most days, can t work or engage in other necessary activities, you have trouble taking care of basic hygiene, or basic responsibilities, thoughts of suicide or death that will not go away, self-injurious behavior.     What you need to do:  1. Call your care team and  request a same-day appointment. If they are not available (weekends or after hours) call your local crisis line, emergency room or 911.            Depression Self Care Plan / Survival Kit    Self-Care for Depression  Here s the deal. Your body and mind are really not as separate as most people think.  What you do and think affects how you feel and how you feel influences what you do and think. This means if you do things that people who feel good do, it will help you feel better.  Sometimes this is all it takes.  There is also a place for medication and therapy depending on how severe your depression is, so be sure to consult with your medical provider and/ or Behavioral Health Consultant if your symptoms are worsening or not improving.     In order to better manage my stress, I will:    Exercise  Get some form of exercise, every day. This will help reduce pain and release endorphins, the  feel good  chemicals in your brain. This is almost as good as taking antidepressants!  This is not the same as joining a gym and then never going! (they count on that by the way ) It can be as simple as just going for a walk or doing some gardening, anything that will get you moving.      Hygiene   Maintain good hygiene (Get out of bed in the morning, Make your bed, Brush your teeth, Take a shower, and Get dressed like you were going to work, even if you are unemployed).  If your clothes don't fit try to get ones that do.    Diet  I will strive to eat foods that are good for me, drink plenty of water, and avoid excessive sugar, caffeine, alcohol, and other mood-altering substances.  Some foods that are helpful in depression are: complex carbohydrates, B vitamins, flaxseed, fish or fish oil, fresh fruits and vegetables.    Psychotherapy  I agree to participate in Individual Therapy (if recommended).    Medication  If prescribed medications, I agree to take them.  Missing doses can result in serious side effects.  I understand that  drinking alcohol, or other illicit drug use, may cause potential side effects.  I will not stop my medication abruptly without first discussing it with my provider.    Staying Connected With Others  I will stay in touch with my friends, family members, and my primary care provider/team.    Use your imagination  Be creative.  We all have a creative side; it doesn t matter if it s oil painting, sand castles, or mud pies! This will also kick up the endorphins.    Witness Beauty  (AKA stop and smell the roses) Take a look outside, even in mid-winter. Notice colors, textures. Watch the squirrels and birds.     Service to others  Be of service to others.  There is always someone else in need.  By helping others we can  get out of ourselves  and remember the really important things.  This also provides opportunities for practicing all the other parts of the program.    Humor  Laugh and be silly!  Adjust your TV habits for less news and crime-drama and more comedy.    Control your stress  Try breathing deep, massage therapy, biofeedback, and meditation. Find time to relax each day.     My support system    Clinic Contact:  Phone number:    Contact 1:  Phone number:    Contact 2:  Phone number:    Quaker/:  Phone number:    Therapist:  Phone number:    Local crisis center:    Phone number:    Other community support:  Phone number:
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
## 2018-04-30 NOTE — PATIENT INSTRUCTIONS
Labs today  Valium printed for you  New occupational therapy orders placed  Follow up at least annually or as needed     Preventive Health Recommendations  Female Ages 40 to 49    Yearly exam:     See your health care provider every year in order to  1. Review health changes.   2. Discuss preventive care.    3. Review your medicines if your doctor prescribed any.      Get a Pap test every three years (unless you have an abnormal result and your provider advises testing more often).      If you get Pap tests with HPV test, you only need to test every 5 years, unless you have an abnormal result. You do not need a Pap test if your uterus was removed (hysterectomy) and you have not had cancer.      You should be tested each year for STDs (sexually transmitted diseases), if you're at risk.       Ask your doctor if you should have a mammogram.      Have a colonoscopy (test for colon cancer) if someone in your family has had colon cancer or polyps before age 50.       Have a cholesterol test every 5 years.       Have a diabetes test (fasting glucose) after age 45. If you are at risk for diabetes, you should have this test every 3 years.    Shots: Get a flu shot each year. Get a tetanus shot every 10 years.     Nutrition:     Eat at least 5 servings of fruits and vegetables each day.    Eat whole-grain bread, whole-wheat pasta and brown rice instead of white grains and rice.    Talk to your provider about Calcium and Vitamin D.     Lifestyle    Exercise at least 150 minutes a week (an average of 30 minutes a day, 5 days a week). This will help you control your weight and prevent disease.    Limit alcohol to one drink per day.    No smoking.     Wear sunscreen to prevent skin cancer.    See your dentist every six months for an exam and cleaning.

## 2018-04-30 NOTE — NURSING NOTE
"Chief Complaint   Patient presents with     Physical       Initial /89 (BP Location: Right arm, Cuff Size: Adult Large)  Pulse 82  Temp 99.7  F (37.6  C) (Oral)  Ht 5' 2.5\" (1.588 m)  Wt 196 lb 14.4 oz (89.3 kg)  LMP 04/14/2018 (Exact Date)  SpO2 96%  BMI 35.44 kg/m2 Estimated body mass index is 35.44 kg/(m^2) as calculated from the following:    Height as of this encounter: 5' 2.5\" (1.588 m).    Weight as of this encounter: 196 lb 14.4 oz (89.3 kg).  Medication Reconciliation: complete   Candy Brian MA  "

## 2018-04-30 NOTE — MR AVS SNAPSHOT
After Visit Summary   4/30/2018    Maegan Lira    MRN: [id number]           Patient Information     Date Of Birth          1970        Visit Information        Provider Department      4/30/2018 11:30 AM Jelly Mcarthur DO [facility]        Today's Diagnoses     Encounter for preventative adult health care exam with abnormal findings    -  1    Paresthesia of both hands        Fibromyalgia        Screening for malignant neoplasm of cervix          Care Instructions    Labs today  Valium printed for you  New occupational therapy orders placed  Follow up at least annually or as needed     Preventive Health Recommendations  Female Ages 40 to 49    Yearly exam:     See your health care provider every year in order to  1. Review health changes.   2. Discuss preventive care.    3. Review your medicines if your doctor prescribed any.      Get a Pap test every three years (unless you have an abnormal result and your provider advises testing more often).      If you get Pap tests with HPV test, you only need to test every 5 years, unless you have an abnormal result. You do not need a Pap test if your uterus was removed (hysterectomy) and you have not had cancer.      You should be tested each year for STDs (sexually transmitted diseases), if you're at risk.       Ask your doctor if you should have a mammogram.      Have a colonoscopy (test for colon cancer) if someone in your family has had colon cancer or polyps before age 50.       Have a cholesterol test every 5 years.       Have a diabetes test (fasting glucose) after age 45. If you are at risk for diabetes, you should have this test every 3 years.    Shots: Get a flu shot each year. Get a tetanus shot every 10 years.     Nutrition:     Eat at least 5 servings of fruits and vegetables each day.    Eat whole-grain bread, whole-wheat pasta and brown rice instead of white grains and rice.    Talk to your provider about Calcium and  "Vitamin D.     Lifestyle    Exercise at least 150 minutes a week (an average of 30 minutes a day, 5 days a week). This will help you control your weight and prevent disease.    Limit alcohol to one drink per day.    No smoking.     Wear sunscreen to prevent skin cancer.    See your dentist every six months for an exam and cleaning.          Follow-ups after your visit        Additional Services     OCCUPATIONAL THERAPY REFERRAL       *This therapy referral will be filtered to a centralized scheduling office at [facility] and the patient will receive a call to schedule an appointment at a [place] location most convenient for them. *     [facility] provides Occupational Therapy evaluation and treatment and many specialty services across the [place] system.  If requesting a specialty program, please choose from the list below.    If you have not heard from the scheduling office within 2 business days, please call [phone] for all locations, with the exception of [place], please call [phone] and [facility], please call [phone]    Treatment: Evaluation & Treatment  Special Instructions/Modalities: None  Special Programs: Hand therapy    Please be aware that coverage of these services is subject to the terms and limitations of your health insurance plan.  Call member services at your health plan with any benefit or coverage questions.      **Note to Provider:  If you are referring outside of [place] for the therapy appointment, please list the name of the location in the \"special instructions\" above, print the referral and give to the patient to schedule the appointment.                  Your next 10 appointments already scheduled     May 10, 2018 12:30 PM TWYLA Smyth with Felicia Summers   [facility]    [address] [place] [id number] [phone]              Who to contact     If you have questions or " "need follow up information about today's clinic visit or your schedule please contact [facility] directly at [phone].  Normal or non-critical lab and imaging results will be communicated to you by [employer], letter or phone within 4 business days after the clinic has received the results. If you do not hear from us within 7 days, please contact the clinic through [employer] or phone. If you have a critical or abnormal lab result, we will notify you by phone as soon as possible.  Submit refill requests through [employer] or call your pharmacy and they will forward the refill request to us. Please allow 3 business days for your refill to be completed.          Additional Information About Your Visit        [employer]ledge[employer]Hub Information     [employer] gives you secure access to your electronic health record. If you see a primary care provider, you can also send messages to your care team and make appointments. If you have questions, please call your primary care clinic.  If you do not have a primary care provider, please call [phone] and they will assist you.        Care EveryWhere ID     This is your Care EveryWhere ID. This could be used by other organizations to access your [place] medical records  [id number]        Your Vitals Were     Pulse Temperature Height Last Period Pulse Oximetry BMI (Body Mass Index)    82 99.7  F (37.6  C) (Oral) 5' 2.5\" (1.588 m) 04/14/2018 (Exact Date) 96% 35.44 kg/m2       Blood Pressure from Last 3 Encounters:   04/30/18 135/89   09/01/17 (!) 140/96   08/15/17 119/79    Weight from Last 3 Encounters:   04/30/18 196 lb 14.4 oz (89.3 kg)   09/01/17 192 lb (87.1 kg)   08/15/17 192 lb (87.1 kg)              We Performed the Following     CRP inflammation     ESR: Erythrocyte sedimentation rate     HPV High Risk Types DNA Cervical     OCCUPATIONAL THERAPY REFERRAL     Pap imaged thin layer screen with HPV - recommended age 30 - 65 years (select HPV order below)          Where to get " your medicines      Some of these will need a paper prescription and others can be bought over the counter.  Ask your nurse if you have questions.     Bring a paper prescription for each of these medications     diazepam 5 MG tablet          Primary Care Provider Office Phone # Fax #    Jelly Mcarthur [phone] [address] [facility] [address]  [facility] [address]        Equal Access to Services     [facility] LASHAUN : Hadii aad ku hadasho Soomaali, waaxda luqadaha, qaybta kaalmada adeegyada, waxay idiin hayaan adeeg kharash la'aan . So [facility] [phone].    ATENCIÓN: Si habla esplilian, tiene a alejo disposición servicios gratuitos de asistencia lingüística. Peggy al [phone].    We comply with applicable federal civil rights laws and [place] laws. We do not discriminate on the basis of race, color, national origin, age, disability, sex, sexual orientation, or gender identity.            Thank you!     Thank you for choosing [facility]  for your care. Our goal is always to provide you with excellent care. Hearing back from our patients is one way we can continue to improve our services. Please take a few minutes to complete the written survey that you may receive in the mail after your visit with us. Thank you!             Your Updated Medication List - Protect others around you: Learn how to safely use, store and throw away your medicines at www.disposemymeds.org.          This list is accurate as of 4/30/18 12:49 PM.  Always use your most recent med list.                   Brand Name Dispense Instructions for use Diagnosis    Butalbital-APAP-Caffeine -40 MG Caps     30 capsule    Take one capsule as needed for migraine. Max one capsule daily. Do not take with opiate pain medications including oxycodone.    Migraine without status migrainosus, not intractable, unspecified migraine type       diazepam 5 MG tablet    VALIUM    30 tablet    Take 1 tablet (5 mg) by mouth every 6 hours as  needed for anxiety, sleep or muscle spasms If you take this medication, do not take any other muscle relaxants.    Fibromyalgia       DULoxetine 30 MG EC capsule    CYMBALTA    90 capsule    TAKE 1 CAPSULE BY MOUTH EVERY DAY    Chronic pain syndrome       hydrOXYzine 25 MG tablet    ATARAX    60 tablet    TAKE 1 TO 2 TABLETS(25 TO 50 MG) BY MOUTH EVERY 6 HOURS AS NEEDED FOR ANXIETY    Anxiety       naproxen 375 MG tablet    NAPROSYN    60 tablet    Take 1 tablet (375 mg) by mouth 2 times daily as needed for moderate pain    Myofacial muscle pain       omeprazole 20 MG CR capsule    priLOSEC    30 capsule    TAKE 1 CAPSULE BY MOUTH DAILY AS NEEDED    Nausea       ondansetron 4 MG ODT tab    ZOFRAN-ODT    30 tablet    Take 1 tablet (4 mg) by mouth daily as needed for nausea    Nausea       senna-docusate 8.6-50 MG per tablet    KATIANA-COLACE    90 tablet    Take 1-2 tablets by mouth 2 times daily as needed for constipation    Constipation, unspecified constipation type       UNABLE TO FIND      Take 10 drops by mouth 2 times daily MEDICATION NAME: CBD Max Hemp Oil

## 2018-05-01 ASSESSMENT — ANXIETY QUESTIONNAIRES: GAD7 TOTAL SCORE: 1

## 2018-05-01 ASSESSMENT — PATIENT HEALTH QUESTIONNAIRE - PHQ9: SUM OF ALL RESPONSES TO PHQ QUESTIONS 1-9: 3

## 2018-05-06 ENCOUNTER — TELEPHONE (OUTPATIENT)
Dept: FAMILY MEDICINE | Facility: CLINIC | Age: 48
End: 2018-05-06

## 2018-05-06 LAB
COPATH REPORT: NORMAL
PAP: NORMAL

## 2018-05-06 NOTE — TELEPHONE ENCOUNTER
Please call [facility] for her last colonoscopy and pathology reports. She had this done at [facility]. She thinks it was about 2015. Thanks!    [facility]: [phone]

## 2018-05-08 LAB
FINAL DIAGNOSIS: NORMAL
HPV HR 12 DNA CVX QL NAA+PROBE: NEGATIVE
HPV16 DNA SPEC QL NAA+PROBE: NEGATIVE
HPV18 DNA SPEC QL NAA+PROBE: NEGATIVE
SPECIMEN DESCRIPTION: NORMAL
SPECIMEN SOURCE CVX/VAG CYTO: NORMAL

## 2018-05-10 ENCOUNTER — THERAPY VISIT (OUTPATIENT)
Dept: OCCUPATIONAL THERAPY | Facility: CLINIC | Age: 48
End: 2018-05-10
Payer: COMMERCIAL

## 2018-05-10 DIAGNOSIS — G56.03 CARPAL TUNNEL SYNDROME, BILATERAL: ICD-10-CM

## 2018-05-10 PROCEDURE — 97140 MANUAL THERAPY 1/> REGIONS: CPT | Mod: GO | Performed by: OCCUPATIONAL THERAPIST

## 2018-05-10 PROCEDURE — 97112 NEUROMUSCULAR REEDUCATION: CPT | Mod: GO | Performed by: OCCUPATIONAL THERAPIST

## 2018-05-10 PROCEDURE — 97110 THERAPEUTIC EXERCISES: CPT | Mod: GO | Performed by: OCCUPATIONAL THERAPIST

## 2018-05-10 NOTE — MR AVS SNAPSHOT
After Visit Summary   5/10/2018    Maegan Lira    MRN: [id number]           Patient Information     Date Of Birth          1970        Visit Information        Provider Department      5/10/2018 12:30 PM Felicia Summers [facility]        Today's Diagnoses     Carpal tunnel syndrome, bilateral           Follow-ups after your visit        Your next 10 appointments already scheduled     May 31, 2018 12:00 PM CDT   CINDY Hand with Felicia Melina   [facility]    [address] [place] [id number] [phone]              Who to contact     If you have questions or need follow up information about today's clinic visit or your schedule please contact [facility] directly at [phone].  Normal or non-critical lab and imaging results will be communicated to you by MyChart, letter or phone within 4 business days after the clinic has received the results. If you do not hear from us within 7 days, please contact the clinic through MyChart or phone. If you have a critical or abnormal lab result, we will notify you by phone as soon as possible.  Submit refill requests through [employer] or call your pharmacy and they will forward the refill request to us. Please allow 3 business days for your refill to be completed.          Additional Information About Your Visit        MyChart Information     [employer] gives you secure access to your electronic health record. If you see a primary care provider, you can also send messages to your care team and make appointments. If you have questions, please call your primary care clinic.  If you do not have a primary care provider, please call [phone] and they will assist you.        Care EveryWhere ID     This is your Care EveryWhere ID. This could be used by other organizations to access your [place] medical records  [id number]        Your Vitals Were     Last Period                   04/14/2018  (Exact Date)            Blood Pressure from Last 3 Encounters:   04/30/18 135/89   09/01/17 (!) 140/96   08/15/17 119/79    Weight from Last 3 Encounters:   04/30/18 89.3 kg (196 lb 14.4 oz)   09/01/17 87.1 kg (192 lb)   08/15/17 87.1 kg (192 lb)              We Performed the Following     MANUAL THER TECH,1+REGIONS,EA 15 MIN     NEUROMUSCULAR RE-EDUCATION     THERAPEUTIC EXERCISES        Primary Care Provider Office Phone # Fax #    Jelly Mcarthur,  [phone] [address] [facility] [address]  [facility] [address]        Equal Access to Services     KENYON WILKS : Hadii aad manuel hadasho Sogardenia, waaxda luqadaha, qaybta kaalmada adeaamiryada, felicity paredes . So [facility] [phone].    ATENCIÓN: Si habla español, tiene a alejo disposición servicios gratuitos de asistencia lingüística. Llame al [phone].    We comply with applicable federal civil rights laws and [place] laws. We do not discriminate on the basis of race, color, national origin, age, disability, sex, sexual orientation, or gender identity.            Thank you!     Thank you for choosing [facility]  for your care. Our goal is always to provide you with excellent care. Hearing back from our patients is one way we can continue to improve our services. Please take a few minutes to complete the written survey that you may receive in the mail after your visit with us. Thank you!             Your Updated Medication List - Protect others around you: Learn how to safely use, store and throw away your medicines at www.disposemymeds.org.          This list is accurate as of 5/10/18  1:54 PM.  Always use your most recent med list.                   Brand Name Dispense Instructions for use Diagnosis    Butalbital-APAP-Caffeine -40 MG Caps     30 capsule    Take one capsule as needed for migraine. Max one capsule daily. Do not take with opiate pain medications including oxycodone.    Migraine without status  migrainosus, not intractable, unspecified migraine type       diazepam 5 MG tablet    VALIUM    30 tablet    Take 1 tablet (5 mg) by mouth every 6 hours as needed for anxiety, sleep or muscle spasms If you take this medication, do not take any other muscle relaxants.    Fibromyalgia       DULoxetine 30 MG EC capsule    CYMBALTA    90 capsule    TAKE 1 CAPSULE BY MOUTH EVERY DAY    Chronic pain syndrome       hydrOXYzine 25 MG tablet    ATARAX    60 tablet    TAKE 1 TO 2 TABLETS(25 TO 50 MG) BY MOUTH EVERY 6 HOURS AS NEEDED FOR ANXIETY    Anxiety       naproxen 375 MG tablet    NAPROSYN    60 tablet    Take 1 tablet (375 mg) by mouth 2 times daily as needed for moderate pain    Myofacial muscle pain       omeprazole 20 MG CR capsule    priLOSEC    30 capsule    TAKE 1 CAPSULE BY MOUTH DAILY AS NEEDED    Nausea       ondansetron 4 MG ODT tab    ZOFRAN-ODT    30 tablet    Take 1 tablet (4 mg) by mouth daily as needed for nausea    Nausea       senna-docusate 8.6-50 MG per tablet    KATIANA-COLACE    90 tablet    Take 1-2 tablets by mouth 2 times daily as needed for constipation    Constipation, unspecified constipation type       UNABLE TO FIND      Take 10 drops by mouth 2 times daily MEDICATION NAME: CBD Max Hemp Oil

## 2018-05-10 NOTE — PROGRESS NOTES
SOAP note objective information for 5/10/2018.    Please refer to the daily flowsheet for treatment today, total treatment time and time spent performing 1:1 timed codes.       Pain Level Report  VAS(0-10) 6/1/2017 6/8/2017 6/15/2017 6/20/2017 6/29/2017 9/1/2017 9/14/2017 9/21/2017   At Rest: 7 tingling  R: 6  L: 6 7 tingling  R: 6  L: 6 7 tingling  R: 6  L: 6 7 tingling  R: 6  L: 6 6-7 tingling  R: 5  L: 5 5 tingling  R: 3  L: 3 4 tingling  R: 3  L: 3 3 tingling  R: 3  L: 3   With Use: R: 8  L: 8 R: 8  L: 8 R: 8  L: 8 R: 8  L: 8 R: 8  L: 8 R: 6-7  L: 6-7 R: 6  L: 6 R: 5  L: 5     VAS(0-10) 9/28/2017 10/5/2017 10/19/17 11/2/2017 11/9/2017 11/14/17 11/30/17 12/7/2017   At Rest: 3 tingling  R: 3  L: 3 3 tingling  R: 3  L: 3 5 tingling  R: 5  L: 5 7 tingling  R: 7  L: 7 6 tingling  R: 5  L: 5 6 tingling  R: 5  L: 5 6 tingling  R: 5  L: 5 6 tingling  R: 5  L: 5   With Use: R: 5  L: 5 R: 5-6  R elbow 6-8  L: 5-6 R: 7-8  R elbow 3  L: 6 R: 9  R elbow 3  L: 9 R: 8  R elbow 1  L: 8 R: 8  R elbow 1  L: 8 R: 8  R elbow 1  L: 8 R: 8  B elbows 2  L: 8     VAS(0-10) 3/9/2018 4/12/2018 4/27/2018 5/10/2018       At Rest: 3-4 tingling  R: 3  L: 3 4 tingling  R: 5  L: 3 0-1 tingling  R: 1  L: 1 0  tingling  R: 0  L: 0       With Use: R: 5  B elbows 0  L: 5 R: 5  B elbows 0  L: 5 R: 0-1  B elbows 0  L: 1-2 R: 0-1  B elbows 0  L: 1-2           Report of Pain:  Location:  All fingers  Description:  pain  Frequency:  constant    Duration:  lingers  Exacerbated by:  use  Relieved by:  meds (somewhat)  Progression: improving    Special Tests:   Date 6/1/2017 6/29/2017 9/1/2017 11/30/2017 3/9/2018    Left Right Left Right Left Right Left Right Left Right   Tinels CT             Tinels PIN             Tinels DSRN             Tinels cubital tunnel             Tinels [place]             Phalen's   + + - + - - - -     Elbow Flexion Test   + + - - - - - + + R,S + R,S     Date         Left Right Left Right Left Right Left Right Left Right    Tinels CT             Tinels PIN             Tinels DSRN             Tinels cubital tunnel             Tinels [place]             Phalen's               Elbow Flexion Test                   ULTT ( % of full glide )  Date 6/1/2017 6/8/17 6/20/2017 6/29/2017 9/1/2017 9/14/2017   Median nerve                 Ulnar nerve    Radial nerve  R:  + at 90 degrees arm abd and wrist extended  L:  + at 60 with wrist extended  R:  + at 90 degrees arm abd and wrist extended  L:  + at 60 with wrist extended R:  + at 60 degrees arm abd and wrist straight  L:  + at 60 with wrist extended R:  + at 45 degrees arm abd and wrist straight  L:  + at 45 with wrist extended R:  + at 80 degrees arm abd and wrist straight  L:  + at 80 with wrist extended R:  + at 80 degrees arm abd and wrist straight  L:  + at 60 with wrist extended     Date 9/21/2017 9/28/2017 10/5/2017 11/2/2017 11/9/2017 11/14/2017   Median nerve                   Ulnar nerve    Radial nerve  R:  + at 90 degrees arm abd and wrist extended  L:  + at 70 with wrist extended  R:  + at 90 degrees arm abd and wrist extended  L:  + at 90 with wrist extended  R:  + at 80 degrees arm abd and wrist extended  L:  + at 80 with wrist extended  R:  + at 90 degrees arm abd and wrist neutral  L:  + at 60 with wrist extended R:  + at 90 degrees arm abd and wrist 45 degree extension  L:  + at 90 with wrist extended fully R:  + at 90 degrees arm abd and wrist 45 degree extension  L:  + at 90 with wrist extended fully     Date 11/30/2017 12/7/2017 3/9/2018 4/12/2018 4/27/2018 5/10/2018   Median nerve                     Ulnar nerve    Radial nerve  R:  + at 45 degrees arm abd and wrist neutral  L:  + at 90 with wrist neutral R:  Full nerve glide  L:  Full nerve glide R:  + at 90 degrees arm abd and wrist  Slightly extended  L:  + at 90 with wrist slightly extended R:  + at 90 degrees arm abd and wrist  neutral  L:  + at 70 with wrist neutral R:  + at 90 degrees arm abd and wrist  Fully  extended  L:  + at 90 with wrist fully extended R:  + at 90 degrees arm abd and wrist  Fully extended  L:  + at 90 with wrist fully extended       Cervical Screen: (+ or -)  Date 2017 3/9/2018      Active Active       Flexion - -      Extension - -      Lateral Flexion  Right - -     Lateral Flexion  Left - -     Rotation Right heaviness -     Rotation Left heaviness -     Compression Neck - -       TOS Special Tests  Date 2017 2017 2017 2017 2017 3/9/2018    Left Right Left Right Left Right Left Right Left Right Left Right   Inderjit Test + + + + - + small + + + after 35 sec + after 35 sec - + after 10 sec   Costoclavicular Test + + + + + after45 sec + after 15 sec small and ring - + after 45 sec + after 20 sec + after 20 sec + after 20 sec + after 10 sec   Rae's Test               Adson's Test                   Posture:  []              Normal   []             Forward Neck Posture  [x]             Rounded Forward Shoulders  []             Shoulder Height Difference  Comments:    Patient's proximal musculature at shoulder appears imbalanced with tight pectoralis muscles, subscapularis, upper trapezius, lattisimus and weak scapular stabilizers.  This pattern contributes to overuse of distal musculature.    Edema:  [x]             None  []              Mild   []             Moderate  []              Severe     []              of affected part      Sensation: []              WNL throughout all nerve distributions; per patient report    [x]              Decreased  [x]              Median    [x]             Ulnar    []             Radial nerve distribution    []              See Sensory Evaluation    Description:    STRENGTH:   (Measured in pounds)     2017   2017   2017 10/19/2017     Trials Right Left Right Left Right Left  Right Left   1  2  3  Av#-       65#-       65#       68#       70#       71#       65#       75#      2017 3/9/2018 2018     Trials Right Left Right Left Right Left  Right Left   1  2  3  Av#       70#       70#       65#       70#       65#         3 pt Pinch 2017 2017 3/9/2018 2018   Trials Right Left Right Left Right Left  Right Left   1  2  3  Av#-       17#-       19#       19#       19#       19#       19#       19#       Lateral Pinch 2017   2017   3/9/2018   2018     Trials Right Left Right Left Right Left  Right Left   1  2  3  Av#-       21#-       20#       21#       17#       19#       17#       19#       Home Exercise Program:  Foam roller massage and stretch pecs  Icing prn  Proximal median nerve glide  Massage fa and upper arm (bicep) (partner to use roller bar to massage)  Clock pec stretch  Latissimus stretch  FM LEP (bilateral)  Extensor stretches  Table top flexor stretch  tricep stretch  Table top flexor stretch  Flom massage  Epsom salts  Can massage tricep  Core strengthening and Lower trap squeezes with nerve gliding  Trigger massage to outer elbow with flexion / extension of elbow  Neck lateral bend with median nerve glide  Use long dowel to push into tight pecs  Back extension exercises over therapy ball  Bullet prosource foam roller for deeper massage  Spiky ball massage to flexor wad and pec area  Back extension over ball  Lower abdominal strengthening-raise legs up and lower slowly      Next Visit:  Laser  Mfr  Stretching  Nerve gliding   Posture and back strengthening

## 2018-05-29 ENCOUNTER — MYC MEDICAL ADVICE (OUTPATIENT)
Dept: FAMILY MEDICINE | Facility: CLINIC | Age: 48
End: 2018-05-29

## 2018-05-31 ENCOUNTER — THERAPY VISIT (OUTPATIENT)
Dept: OCCUPATIONAL THERAPY | Facility: CLINIC | Age: 48
End: 2018-05-31
Payer: COMMERCIAL

## 2018-05-31 DIAGNOSIS — G56.03 CARPAL TUNNEL SYNDROME, BILATERAL: ICD-10-CM

## 2018-05-31 PROCEDURE — 97026 INFRARED THERAPY: CPT | Mod: GO | Performed by: OCCUPATIONAL THERAPIST

## 2018-05-31 PROCEDURE — 97110 THERAPEUTIC EXERCISES: CPT | Mod: GO | Performed by: OCCUPATIONAL THERAPIST

## 2018-05-31 PROCEDURE — 97112 NEUROMUSCULAR REEDUCATION: CPT | Mod: GO | Performed by: OCCUPATIONAL THERAPIST

## 2018-05-31 PROCEDURE — 97140 MANUAL THERAPY 1/> REGIONS: CPT | Mod: GO | Performed by: OCCUPATIONAL THERAPIST

## 2018-05-31 NOTE — PROGRESS NOTES
Progress Note - Hand Therapy    Current Date:  5/31/2018    Diagnosis:  Bilateral hand pain/weakness  DOI:  October 2015    Number of visits to date:  20    Reporting period is 4/27/2018 to 5/31/2018.    Subjectve:   Subjective changes as noted by patient:  My numbness and tingling is less, but my wrists hurt on both sides.     Functional changes noted by patient:  Decreased Performance in Self Care Tasks (dressing, eating, bathing, hygiene/toileting), Work Tasks, Sleep Patterns, Recreational Activities, Household Chores and Driving   Patient has noted adverse reaction to:  None        Objective:  Pain Level Report  VAS(0-10) 6/1/2017 6/8/2017 6/15/2017 6/20/2017 6/29/2017 9/1/2017 9/14/2017 9/21/2017   At Rest: 7 tingling  R: 6  L: 6 7 tingling  R: 6  L: 6 7 tingling  R: 6  L: 6 7 tingling  R: 6  L: 6 6-7 tingling  R: 5  L: 5 5 tingling  R: 3  L: 3 4 tingling  R: 3  L: 3 3 tingling  R: 3  L: 3   With Use: R: 8  L: 8 R: 8  L: 8 R: 8  L: 8 R: 8  L: 8 R: 8  L: 8 R: 6-7  L: 6-7 R: 6  L: 6 R: 5  L: 5     VAS(0-10) 9/28/2017 10/5/2017 10/19/17 11/2/2017 11/9/2017 11/14/17 11/30/17 12/7/2017   At Rest: 3 tingling  R: 3  L: 3 3 tingling  R: 3  L: 3 5 tingling  R: 5  L: 5 7 tingling  R: 7  L: 7 6 tingling  R: 5  L: 5 6 tingling  R: 5  L: 5 6 tingling  R: 5  L: 5 6 tingling  R: 5  L: 5   With Use: R: 5  L: 5 R: 5-6  R elbow 6-8  L: 5-6 R: 7-8  R elbow 3  L: 6 R: 9  R elbow 3  L: 9 R: 8  R elbow 1  L: 8 R: 8  R elbow 1  L: 8 R: 8  R elbow 1  L: 8 R: 8  B elbows 2  L: 8     VAS(0-10) 3/9/2018 4/12/2018 4/27/2018 5/10/2018 5/31/2018      At Rest: 3-4 tingling  R: 3  L: 3 4 tingling  R: 5  L: 3 0-1 tingling  R: 1  L: 1 0  tingling  R: 0  L: 0 R: 0  L: 0      With Use: R: 5  B elbows 0  L: 5 R: 5  B elbows 0  L: 5 R: 0-1  B elbows 0  L: 1-2 R: 0-1  B elbows 0  L: 1-2 R: wrist pain 5/10 bilateral          Report of Pain:  Location:  All fingers  Description:  pain  Frequency:  constant    Duration:  lingers  Exacerbated by:   use  Relieved by:  meds (somewhat)  Progression:exacerbation    Special Tests:   Date 6/1/2017 6/29/2017 9/1/2017 11/30/2017 3/9/2018    Left Right Left Right Left Right Left Right Left Right   Tinels CT             Tinels PIN             Tinels DSRN             Tinels cubital tunnel             Tinels [place]             Phalen's   + + - + - - - -     Elbow Flexion Test   + + - - - - - + + R,S + R,S     Date 5/31/2018        Left Right Left Right Left Right Left Right Left Right   Tinels CT             Tinels PIN             Tinels DSRN             Tinels cubital tunnel             Tinels [place]             Phalen's               Elbow Flexion Test   + R, S + R, S               ULTT ( % of full glide )  Date 6/1/2017 6/8/17 6/20/2017 6/29/2017 9/1/2017 9/14/2017   Median nerve                 Ulnar nerve    Radial nerve  R:  + at 90 degrees arm abd and wrist extended  L:  + at 60 with wrist extended  R:  + at 90 degrees arm abd and wrist extended  L:  + at 60 with wrist extended R:  + at 60 degrees arm abd and wrist straight  L:  + at 60 with wrist extended R:  + at 45 degrees arm abd and wrist straight  L:  + at 45 with wrist extended R:  + at 80 degrees arm abd and wrist straight  L:  + at 80 with wrist extended R:  + at 80 degrees arm abd and wrist straight  L:  + at 60 with wrist extended     Date 9/21/2017 9/28/2017 10/5/2017 11/2/2017 11/9/2017 11/14/2017   Median nerve                   Ulnar nerve    Radial nerve  R:  + at 90 degrees arm abd and wrist extended  L:  + at 70 with wrist extended  R:  + at 90 degrees arm abd and wrist extended  L:  + at 90 with wrist extended  R:  + at 80 degrees arm abd and wrist extended  L:  + at 80 with wrist extended  R:  + at 90 degrees arm abd and wrist neutral  L:  + at 60 with wrist extended R:  + at 90 degrees arm abd and wrist 45 degree extension  L:  + at 90 with wrist extended fully R:  + at 90 degrees arm abd and wrist 45 degree extension  L:  + at 90  with wrist extended fully     Date 11/30/2017 12/7/2017 3/9/2018 4/12/2018 4/27/2018 5/10/2018   Median nerve                     Ulnar nerve    Radial nerve  R:  + at 45 degrees arm abd and wrist neutral  L:  + at 90 with wrist neutral R:  Full nerve glide  L:  Full nerve glide R:  + at 90 degrees arm abd and wrist  Slightly extended  L:  + at 90 with wrist slightly extended R:  + at 90 degrees arm abd and wrist  neutral  L:  + at 70 with wrist neutral R:  + at 90 degrees arm abd and wrist  Fully extended  L:  + at 90 with wrist fully extended R:  + at 90 degrees arm abd and wrist  Fully extended  L:  + at 90 with wrist fully extended     Date 5/31/2018        Median nerve                     Ulnar nerve    Radial nerve  R:  + at 90 degrees arm abd and wrist  Fully extended  L:  + at 90 with wrist fully extended            Cervical Screen: (+ or -)  Date 6/1/2017 3/9/2018      Active Active       Flexion - -      Extension - -      Lateral Flexion  Right - -     Lateral Flexion  Left - -     Rotation Right heaviness -     Rotation Left heaviness -     Compression Neck - -       TOS Special Tests  Date 6/1/2017 6/29/2017 9/1/2017 11/9/2017 11/30/2017 3/9/2018    Left Right Left Right Left Right Left Right Left Right Left Right   Inderjit Test + + + + - + small + + + after 35 sec + after 35 sec - + after 10 sec   Costoclavicular Test + + + + + after45 sec + after 15 sec small and ring - + after 45 sec + after 20 sec + after 20 sec + after 20 sec + after 10 sec   Rae's Test               Adson's Test                 Date 5/31/2018                      Inderjit Test - -           Costoclavicular Test Full feeling Full feeling           Rae's Test             Adson's Test                   Posture:  []              Normal   []             Forward Neck Posture  [x]             Rounded Forward Shoulders  []             Shoulder Height Difference  Comments:    Patient's proximal musculature at shoulder appears imbalanced  with tight pectoralis muscles, subscapularis, upper trapezius, lattisimus and weak scapular stabilizers.  This pattern contributes to overuse of distal musculature.    Edema:  [x]             None  []              Mild   []             Moderate  []              Severe     []              of affected part      Sensation: []              WNL throughout all nerve distributions; per patient report    [x]              Decreased  [x]              Median    [x]             Ulnar    []             Radial nerve distribution    []              See Sensory Evaluation    Description:    STRENGTH:   (Measured in pounds)     2017   2017   2017 10/19/2017     Trials Right Left Right Left Right Left  Right Left   1  2  3  Av#-       65#-       65#       68#       70#       71#       65#       75#      2017 3/9/2018 2018    Trials Right Left Right Left Right Left  Right Left   1  2  3  Av#       70#       70#       65#       70#       65#         3 pt Pinch 2017 2017 3/9/2018 2018   Trials Right Left Right Left Right Left  Right Left   1  2  3  Av#-       17#-       19#       19#       19#       19#       19#       19#       Lateral Pinch 2017   2017   3/9/2018   2018     Trials Right Left Right Left Right Left  Right Left   1  2  3  Av#-       21#-       20#       21#       17#       19#       17#       19#       Assessment:  Response to therapy has been improvement to:  Paresthesias:  Median nerve - less intense numbness and tingling and smaller area of involvement  Ulnar nerve - less intense numbness and tingling and smaller area of involvement  Response to therapy has been decline in:  Pain:  intensity of pain has increased bilateral radial and ulnar wristss    Overall Assessment:  Patient's symptoms are resolving regarding paresthesias.  Patient has experienced an exacerbation of symptoms regarding pain in wrists.  STG/LTG:   STGoals have been reviewed and no progress has been made;  see goal sheet for details and changes.    Plan:  Frequency/Duration:  Recommend continuing to see patient  1 X a month, once daily  for 6-8 visits  Appropriateness of Rx I have re-evaluated this patient and find that the nature, scope, duration and intensity of the therapy is appropriate for the medical condition of the patient.    Recommendations for Continued Therapy  Additions to Treatment Plan -  Ulnar and radial FM wrists; SCS bilateral ulnar wrists    Home Exercise Program:  Foam roller massage and stretch pecs  Icing prn  Proximal median nerve glide  Massage fa and upper arm (bicep) (partner to use roller bar to massage)  Clock pec stretch  Latissimus stretch  FM LEP (bilateral)  Extensor stretches  Table top flexor stretch  tricep stretch  Table top flexor stretch  Alexander massage  Epsom salts  Can massage tricep  Core strengthening and Lower trap squeezes with nerve gliding  Trigger massage to outer elbow with flexion / extension of elbow  Neck lateral bend with median nerve glide  Use long dowel to push into tight pecs  Back extension exercises over therapy ball  Bullet prosource foam roller for deeper massage  Spiky ball massage to flexor wad and pec area  Back extension over ball  Lower abdominal strengthening-raise legs up and lower slowly  Ulnar and radial FM wrists; SCS bilateral ulnar wrists    Next Visit:  Laser  Mfr  Stretching  Nerve gliding   Posture and back strengthening

## 2018-06-21 ENCOUNTER — THERAPY VISIT (OUTPATIENT)
Dept: OCCUPATIONAL THERAPY | Facility: CLINIC | Age: 48
End: 2018-06-21
Payer: COMMERCIAL

## 2018-06-21 DIAGNOSIS — G56.03 CARPAL TUNNEL SYNDROME, BILATERAL: ICD-10-CM

## 2018-06-21 PROCEDURE — 97026 INFRARED THERAPY: CPT | Mod: GO | Performed by: OCCUPATIONAL THERAPIST

## 2018-06-21 PROCEDURE — 97112 NEUROMUSCULAR REEDUCATION: CPT | Mod: GO | Performed by: OCCUPATIONAL THERAPIST

## 2018-06-21 PROCEDURE — 97140 MANUAL THERAPY 1/> REGIONS: CPT | Mod: GO | Performed by: OCCUPATIONAL THERAPIST

## 2018-06-21 PROCEDURE — 97110 THERAPEUTIC EXERCISES: CPT | Mod: GO | Performed by: OCCUPATIONAL THERAPIST

## 2018-06-21 NOTE — PROGRESS NOTES
SOAP note objective information for 6/21/2018.    Please refer to the daily flowsheet for treatment today, total treatment time and time spent performing 1:1 timed codes.       Objective:  Pain Level Report  VAS(0-10) 6/1/2017 6/8/2017 6/15/2017 6/20/2017 6/29/2017 9/1/2017 9/14/2017 9/21/2017   At Rest: 7 tingling  R: 6  L: 6 7 tingling  R: 6  L: 6 7 tingling  R: 6  L: 6 7 tingling  R: 6  L: 6 6-7 tingling  R: 5  L: 5 5 tingling  R: 3  L: 3 4 tingling  R: 3  L: 3 3 tingling  R: 3  L: 3   With Use: R: 8  L: 8 R: 8  L: 8 R: 8  L: 8 R: 8  L: 8 R: 8  L: 8 R: 6-7  L: 6-7 R: 6  L: 6 R: 5  L: 5     VAS(0-10) 9/28/2017 10/5/2017 10/19/17 11/2/2017 11/9/2017 11/14/17 11/30/17 12/7/2017   At Rest: 3 tingling  R: 3  L: 3 3 tingling  R: 3  L: 3 5 tingling  R: 5  L: 5 7 tingling  R: 7  L: 7 6 tingling  R: 5  L: 5 6 tingling  R: 5  L: 5 6 tingling  R: 5  L: 5 6 tingling  R: 5  L: 5   With Use: R: 5  L: 5 R: 5-6  R elbow 6-8  L: 5-6 R: 7-8  R elbow 3  L: 6 R: 9  R elbow 3  L: 9 R: 8  R elbow 1  L: 8 R: 8  R elbow 1  L: 8 R: 8  R elbow 1  L: 8 R: 8  B elbows 2  L: 8     VAS(0-10) 3/9/2018 4/12/2018 4/27/2018 5/10/2018 5/31/2018 6/21/2018     At Rest: 3-4 tingling  R: 3  L: 3 4 tingling  R: 5  L: 3 0-1 tingling  R: 1  L: 1 0  tingling  R: 0  L: 0 R: 0  L: 0 R: 0  L: 0     With Use: R: 5  B elbows 0  L: 5 R: 5  B elbows 0  L: 5 R: 0-1  B elbows 0  L: 1-2 R: 0-1  B elbows 0  L: 1-2 R: wrist pain 5/10 bilateral R: wrist pain 5/10 bilateral         Report of Pain:  Location:  All fingers  Description:  pain  Frequency:  constant    Duration:  lingers  Exacerbated by:  use  Relieved by:  meds (somewhat)  Progression:exacerbation    Special Tests:   Date 6/1/2017 6/29/2017 9/1/2017 11/30/2017 3/9/2018    Left Right Left Right Left Right Left Right Left Right   Tinels CT             Tinels PIN             Tinels DSRN             Tinels cubital tunnel             Tinels [place]             Phalen's   + + - + - - - -     Elbow Flexion  Test   + + - - - - - + + R,S + R,S     Date 5/31/2018        Left Right Left Right Left Right Left Right Left Right   Tinels CT             Tinels PIN             Tinels DSRN             Tinels cubital tunnel             Tinels [place]             Phalen's               Elbow Flexion Test   + R, S + R, S               ULTT ( % of full glide )  Date 6/1/2017 6/8/17 6/20/2017 6/29/2017 9/1/2017 9/14/2017   Median nerve                 Ulnar nerve    Radial nerve  R:  + at 90 degrees arm abd and wrist extended  L:  + at 60 with wrist extended  R:  + at 90 degrees arm abd and wrist extended  L:  + at 60 with wrist extended R:  + at 60 degrees arm abd and wrist straight  L:  + at 60 with wrist extended R:  + at 45 degrees arm abd and wrist straight  L:  + at 45 with wrist extended R:  + at 80 degrees arm abd and wrist straight  L:  + at 80 with wrist extended R:  + at 80 degrees arm abd and wrist straight  L:  + at 60 with wrist extended     Date 9/21/2017 9/28/2017 10/5/2017 11/2/2017 11/9/2017 11/14/2017   Median nerve                   Ulnar nerve    Radial nerve  R:  + at 90 degrees arm abd and wrist extended  L:  + at 70 with wrist extended  R:  + at 90 degrees arm abd and wrist extended  L:  + at 90 with wrist extended  R:  + at 80 degrees arm abd and wrist extended  L:  + at 80 with wrist extended  R:  + at 90 degrees arm abd and wrist neutral  L:  + at 60 with wrist extended R:  + at 90 degrees arm abd and wrist 45 degree extension  L:  + at 90 with wrist extended fully R:  + at 90 degrees arm abd and wrist 45 degree extension  L:  + at 90 with wrist extended fully     Date 11/30/2017 12/7/2017 3/9/2018 4/12/2018 4/27/2018 5/10/2018   Median nerve                     Ulnar nerve    Radial nerve  R:  + at 45 degrees arm abd and wrist neutral  L:  + at 90 with wrist neutral R:  Full nerve glide  L:  Full nerve glide R:  + at 90 degrees arm abd and wrist  Slightly extended  L:  + at 90 with wrist slightly  extended R:  + at 90 degrees arm abd and wrist  neutral  L:  + at 70 with wrist neutral R:  + at 90 degrees arm abd and wrist  Fully extended  L:  + at 90 with wrist fully extended R:  + at 90 degrees arm abd and wrist  Fully extended  L:  + at 90 with wrist fully extended     Date 5/31/2018 6/21/2018       Median nerve                     Ulnar nerve    Radial nerve  R:  + at 90 degrees arm abd and wrist  Fully extended  L:  + at 90 with wrist fully extended R:  + at 70 degrees arm abd and wrist  Fully extended  L:  + at 70 with wrist fully extended           Cervical Screen: (+ or -)  Date 6/1/2017 3/9/2018      Active Active       Flexion - -      Extension - -      Lateral Flexion  Right - -     Lateral Flexion  Left - -     Rotation Right heaviness -     Rotation Left heaviness -     Compression Neck - -       TOS Special Tests  Date 6/1/2017 6/29/2017 9/1/2017 11/9/2017 11/30/2017 3/9/2018    Left Right Left Right Left Right Left Right Left Right Left Right   Inderjit Test + + + + - + small + + + after 35 sec + after 35 sec - + after 10 sec   Costoclavicular Test + + + + + after45 sec + after 15 sec small and ring - + after 45 sec + after 20 sec + after 20 sec + after 20 sec + after 10 sec   Rae's Test               Adson's Test                 Date 5/31/2018                      Inderjit Test - -           Costoclavicular Test Full feeling Full feeling           Rae's Test             Adson's Test                   Posture:  []              Normal   []             Forward Neck Posture  [x]             Rounded Forward Shoulders  []             Shoulder Height Difference  Comments:    Patient's proximal musculature at shoulder appears imbalanced with tight pectoralis muscles, subscapularis, upper trapezius, lattisimus and weak scapular stabilizers.  This pattern contributes to overuse of distal musculature.    Edema:  [x]             None  []              Mild   []             Moderate  []              Severe      []              of affected part      Sensation: []              WNL throughout all nerve distributions; per patient report    [x]              Decreased  [x]              Median    [x]             Ulnar    []             Radial nerve distribution    []              See Sensory Evaluation    Description:    STRENGTH:   (Measured in pounds)     2017   2017   2017 10/19/2017     Trials Right Left Right Left Right Left  Right Left   1  2  3  Av#-       65#-       65#       68#       70#       71#       65#       75#      2017 3/9/2018 2018    Trials Right Left Right Left Right Left  Right Left   1  2  3  Av#       70#       70#       65#       70#       65#         3 pt Pinch 2017 2017 3/9/2018 2018   Trials Right Left Right Left Right Left  Right Left   1  2  3  Av#-       17#-       19#       19#       19#       19#       19#       19#       Lateral Pinch 2017   2017   3/9/2018   2018     Trials Right Left Right Left Right Left  Right Left   1  2  3  Av#-       21#-       20#       21#       17#       19#       17#       19#       Home Exercise Program:  Foam roller massage and stretch pecs  Icing prn  Proximal median nerve glide  Massage fa and upper arm (bicep) (partner to use roller bar to massage)  Clock pec stretch  Latissimus stretch  FM LEP (bilateral)  Extensor stretches  Table top flexor stretch  tricep stretch  Table top flexor stretch  [place] massage  Epsom salts  Can massage tricep  Core strengthening and Lower trap squeezes with nerve gliding  Trigger massage to outer elbow with flexion / extension of elbow  Neck lateral bend with median nerve glide  Use long dowel to push into tight pecs  Back extension exercises over therapy ball  Bullet prosource foam roller for deeper massage  Spiky ball massage to flexor wad and pec area  Back extension over ball  Lower abdominal strengthening-raise legs up and  lower slowly  Ulnar and radial FM wrists; SCS bilateral ulnar wrists    Next Visit:  Laser  Mfr  Stretching  Nerve gliding   Posture and back strengthening

## 2018-06-21 NOTE — MR AVS SNAPSHOT
After Visit Summary   6/21/2018    Maegan Lira    MRN: [id number]           Patient Information     Date Of Birth          1970        Visit Information        Provider Department      6/21/2018 9:30 AM Felicia Summers [facility]        Today's Diagnoses     Carpal tunnel syndrome, bilateral           Follow-ups after your visit        Your next 10 appointments already scheduled     Jul 12, 2018  1:00 PM CDT   CINDY Hand with Felicia Melina   [facility] ([facility])    [address] [place] [id number] [phone]              Who to contact     If you have questions or need follow up information about today's clinic visit or your schedule please contact [facility] directly at [phone].  Normal or non-critical lab and imaging results will be communicated to you by MyChart, letter or phone within 4 business days after the clinic has received the results. If you do not hear from us within 7 days, please contact the clinic through MyChart or phone. If you have a critical or abnormal lab result, we will notify you by phone as soon as possible.  Submit refill requests through [facility] or call your pharmacy and they will forward the refill request to us. Please allow 3 business days for your refill to be completed.          Additional Information About Your Visit        MyChart Information     [facility] gives you secure access to your electronic health record. If you see a primary care provider, you can also send messages to your care team and make appointments. If you have questions, please call your primary care clinic.  If you do not have a primary care provider, please call [phone] and they will assist you.        Care EveryWhere ID     This is your Care EveryWhere ID. This could be used by other organizations to access your [place] medical records  [id number]         Blood Pressure from Last 3 Encounters:   04/30/18 135/89    09/01/17 (!) 140/96   08/15/17 119/79    Weight from Last 3 Encounters:   04/30/18 89.3 kg (196 lb 14.4 oz)   09/01/17 87.1 kg (192 lb)   08/15/17 87.1 kg (192 lb)              We Performed the Following     INFRARED THERAPY     MANUAL THER TECH,1+REGIONS,EA 15 MIN     NEUROMUSCULAR RE-EDUCATION     THERAPEUTIC EXERCISES        Primary Care Provider Office Phone # Fax #    Jelly Mcarthur,  [phone] [address]  [facility] [address]        Equal Access to Services     [facility]EL [facility] : Hadii aad ku hadasho Soomaali, waaxda luqadaha, qaybta kaalmada adeegyada, felicity paredes . So [facility] [place] [phone].    ATENCIÓN: Si habla español, tiene a alejo disposición servicios gratuitos de asistencia lingüística. LlGalion [facility] [phone].    We comply with applicable federal civil rights laws and [place] laws. We do not discriminate on the basis of race, color, national origin, age, disability, sex, sexual orientation, or gender identity.            Thank you!     Thank you for choosing [facility]  for your care. Our goal is always to provide you with excellent care. Hearing back from our patients is one way we can continue to improve our services. Please take a few minutes to complete the written survey that you may receive in the mail after your visit with us. Thank you!             Your Updated Medication List - Protect others around you: Learn how to safely use, store and throw away your medicines at www.disposemymeds.org.          This list is accurate as of 6/21/18  1:25 PM.  Always use your most recent med list.                   Brand Name Dispense Instructions for use Diagnosis    Butalbital-APAP-Caffeine -40 MG Caps     30 capsule    Take one capsule as needed for migraine. Max one capsule daily. Do not take with opiate pain medications including oxycodone.    Migraine without status migrainosus, not intractable, unspecified migraine type       diazepam  5 MG tablet    VALIUM    30 tablet    Take 1 tablet (5 mg) by mouth every 6 hours as needed for anxiety, sleep or muscle spasms If you take this medication, do not take any other muscle relaxants.    Fibromyalgia       DULoxetine 30 MG EC capsule    CYMBALTA    90 capsule    TAKE 1 CAPSULE BY MOUTH EVERY DAY    Chronic pain syndrome       hydrOXYzine 25 MG tablet    ATARAX    60 tablet    TAKE 1 TO 2 TABLETS(25 TO 50 MG) BY MOUTH EVERY 6 HOURS AS NEEDED FOR ANXIETY    Anxiety       naproxen 375 MG tablet    NAPROSYN    60 tablet    Take 1 tablet (375 mg) by mouth 2 times daily as needed for moderate pain    Myofacial muscle pain       omeprazole 20 MG CR capsule    priLOSEC    30 capsule    TAKE 1 CAPSULE BY MOUTH DAILY AS NEEDED    Nausea       ondansetron 4 MG ODT tab    ZOFRAN-ODT    30 tablet    Take 1 tablet (4 mg) by mouth daily as needed for nausea    Nausea       senna-docusate 8.6-50 MG per tablet    KATIANA-COLACE    90 tablet    Take 1-2 tablets by mouth 2 times daily as needed for constipation    Constipation, unspecified constipation type       UNABLE TO FIND      Take 10 drops by mouth 2 times daily MEDICATION NAME: CBD Max Hemp Oil

## 2018-07-12 ENCOUNTER — THERAPY VISIT (OUTPATIENT)
Dept: OCCUPATIONAL THERAPY | Facility: CLINIC | Age: 48
End: 2018-07-12
Payer: COMMERCIAL

## 2018-07-12 DIAGNOSIS — G56.03 CARPAL TUNNEL SYNDROME, BILATERAL: ICD-10-CM

## 2018-07-12 PROCEDURE — 97026 INFRARED THERAPY: CPT | Mod: GO | Performed by: OCCUPATIONAL THERAPIST

## 2018-07-12 PROCEDURE — 97140 MANUAL THERAPY 1/> REGIONS: CPT | Mod: GO | Performed by: OCCUPATIONAL THERAPIST

## 2018-07-12 PROCEDURE — 97112 NEUROMUSCULAR REEDUCATION: CPT | Mod: GO | Performed by: OCCUPATIONAL THERAPIST

## 2018-07-12 PROCEDURE — 97110 THERAPEUTIC EXERCISES: CPT | Mod: GO | Performed by: OCCUPATIONAL THERAPIST

## 2018-07-12 NOTE — PROGRESS NOTES
Progress Note - Hand Therapy    Current Date:  7/12/2018    Diagnosis:  Bilateral hand pain/weakness  DOI:  October 2015    Number of visits to date: 22    Reporting period is 5/31/2018 to 7/12/2018.    Subjectve:   Subjective changes as noted by patient: Feeling better than last session.  I am still having problems with lifting my pans.  What bothers me the most is weight bearing to clean counters and floors.  Functional changes noted by patient:  Improvement in Self Care Tasks (dressing, eating, bathing, hygiene/toileting), Work Tasks and Recreational Activities  Patient has noted adverse reaction to:  None        Objective:  Pain Level Report  VAS(0-10) 6/1/2017 6/8/2017 6/15/2017 6/20/2017 6/29/2017 9/1/2017 9/14/2017 9/21/2017   At Rest: 7 tingling  R: 6  L: 6 7 tingling  R: 6  L: 6 7 tingling  R: 6  L: 6 7 tingling  R: 6  L: 6 6-7 tingling  R: 5  L: 5 5 tingling  R: 3  L: 3 4 tingling  R: 3  L: 3 3 tingling  R: 3  L: 3   With Use: R: 8  L: 8 R: 8  L: 8 R: 8  L: 8 R: 8  L: 8 R: 8  L: 8 R: 6-7  L: 6-7 R: 6  L: 6 R: 5  L: 5     VAS(0-10) 9/28/2017 10/5/2017 10/19/17 11/2/2017 11/9/2017 11/14/17 11/30/17 12/7/2017   At Rest: 3 tingling  R: 3  L: 3 3 tingling  R: 3  L: 3 5 tingling  R: 5  L: 5 7 tingling  R: 7  L: 7 6 tingling  R: 5  L: 5 6 tingling  R: 5  L: 5 6 tingling  R: 5  L: 5 6 tingling  R: 5  L: 5   With Use: R: 5  L: 5 R: 5-6  R elbow 6-8  L: 5-6 R: 7-8  R elbow 3  L: 6 R: 9  R elbow 3  L: 9 R: 8  R elbow 1  L: 8 R: 8  R elbow 1  L: 8 R: 8  R elbow 1  L: 8 R: 8  B elbows 2  L: 8     VAS(0-10) 3/9/2018 4/12/2018 4/27/2018 5/10/2018 5/31/2018 6/21/2018 7/12/2018    At Rest: 3-4 tingling  R: 3  L: 3 4 tingling  R: 5  L: 3 0-1 tingling  R: 1  L: 1 0  tingling  R: 0  L: 0 R: 0  L: 0 R: 0  L: 0 R: 0  L: 0    With Use: R: 5  B elbows 0  L: 5 R: 5  B elbows 0  L: 5 R: 0-1  B elbows 0  L: 1-2 R: 0-1  B elbows 0  L: 1-2 R: wrist pain 5/10 bilateral R: wrist pain 5/10 bilateral R: wrist pain 3/10 bilateral;  Elbow  pain (medial) 5/10        Report of Pain:  Location:  All fingers  Description:  pain  Frequency:  constant    Duration:  lingers  Exacerbated by:  use  Relieved by:  meds (somewhat)  Progression:exacerbation    Special Tests:   Date 6/1/2017 6/29/2017 9/1/2017 11/30/2017 3/9/2018    Left Right Left Right Left Right Left Right Left Right   Tinels CT             Tinels PIN             Tinels DSRN             Tinels cubital tunnel             Tinels [place]             Phalen's   + + - + - - - -     Elbow Flexion Test   + + - - - - - + + R,S + R,S     Date 5/31/2018 7/12/2018       Left Right Left Right Left Right Left Right Left Right   Tinels CT             Tinels PIN             Tinels DSRN             Tinels cubital tunnel             Tinels [place]             Phalen's               Elbow Flexion Test   + R, S + R, S + R, S + R, S             ULTT ( % of full glide )  Date 6/1/2017 6/8/17 6/20/2017 6/29/2017 9/1/2017 9/14/2017   Median nerve                 Ulnar nerve    Radial nerve  R:  + at 90 degrees arm abd and wrist extended  L:  + at 60 with wrist extended  R:  + at 90 degrees arm abd and wrist extended  L:  + at 60 with wrist extended R:  + at 60 degrees arm abd and wrist straight  L:  + at 60 with wrist extended R:  + at 45 degrees arm abd and wrist straight  L:  + at 45 with wrist extended R:  + at 80 degrees arm abd and wrist straight  L:  + at 80 with wrist extended R:  + at 80 degrees arm abd and wrist straight  L:  + at 60 with wrist extended     Date 9/21/2017 9/28/2017 10/5/2017 11/2/2017 11/9/2017 11/14/2017   Median nerve                   Ulnar nerve    Radial nerve  R:  + at 90 degrees arm abd and wrist extended  L:  + at 70 with wrist extended  R:  + at 90 degrees arm abd and wrist extended  L:  + at 90 with wrist extended  R:  + at 80 degrees arm abd and wrist extended  L:  + at 80 with wrist extended  R:  + at 90 degrees arm abd and wrist neutral  L:  + at 60 with wrist extended  R:  + at 90 degrees arm abd and wrist 45 degree extension  L:  + at 90 with wrist extended fully R:  + at 90 degrees arm abd and wrist 45 degree extension  L:  + at 90 with wrist extended fully     Date 11/30/2017 12/7/2017 3/9/2018 4/12/2018 4/27/2018 5/10/2018   Median nerve                     Ulnar nerve    Radial nerve  R:  + at 45 degrees arm abd and wrist neutral  L:  + at 90 with wrist neutral R:  Full nerve glide  L:  Full nerve glide R:  + at 90 degrees arm abd and wrist  Slightly extended  L:  + at 90 with wrist slightly extended R:  + at 90 degrees arm abd and wrist  neutral  L:  + at 70 with wrist neutral R:  + at 90 degrees arm abd and wrist  Fully extended  L:  + at 90 with wrist fully extended R:  + at 90 degrees arm abd and wrist  Fully extended  L:  + at 90 with wrist fully extended     Date 5/31/2018 6/21/2018 7/12/2018      Median nerve                     Ulnar nerve    Radial nerve  R:  + at 90 degrees arm abd and wrist  Fully extended  L:  + at 90 with wrist fully extended R:  + at 70 degrees arm abd and wrist  Fully extended  L:  + at 70 with wrist fully extended R:  + at 90 degrees arm abd and wrist  Fully extended  L:  + at 90 with wrist fully extended          Cervical Screen: (+ or -)  Date 6/1/2017 3/9/2018      Active Active       Flexion - -      Extension - -      Lateral Flexion  Right - -     Lateral Flexion  Left - -     Rotation Right heaviness -     Rotation Left heaviness -     Compression Neck - -       TOS Special Tests  Date 6/1/2017 6/29/2017 9/1/2017 11/9/2017 11/30/2017 3/9/2018    Left Right Left Right Left Right Left Right Left Right Left Right   Inderjit Test + + + + - + small + + + after 35 sec + after 35 sec - + after 10 sec   Costoclavicular Test + + + + + after45 sec + after 15 sec small and ring - + after 45 sec + after 20 sec + after 20 sec + after 20 sec + after 10 sec   Rae's Test               Adson's Test                 Date 5/31/2018 7/12/2018                      Inderjit Test - -           Costoclavicular Test Full feeling Full feeling Full feeling Full feeling         Rae's Test             Adson's Test                   Posture:  []              Normal   []             Forward Neck Posture  [x]             Rounded Forward Shoulders  []             Shoulder Height Difference  Comments:    Patient's proximal musculature at shoulder appears imbalanced with tight pectoralis muscles, subscapularis, upper trapezius, lattisimus and weak scapular stabilizers.  This pattern contributes to overuse of distal musculature.    Edema:  [x]             None  []              Mild   []             Moderate  []              Severe     []              of affected part      Sensation: []              WNL throughout all nerve distributions; per patient report    [x]              Decreased  [x]              Median    [x]             Ulnar    []             Radial nerve distribution    []              See Sensory Evaluation    Description:    STRENGTH:   (Measured in pounds)     2017   2017   2017 10/19/2017     Trials Right Left Right Left Right Left  Right Left   1  2  3  Av#-       65#-       65#       68#       70#       71#       65#       75#      2017 3/9/2018 2018 2018   Trials Right Left Right Left Right Left  Right Left   1  2  3  Av#       70#       70#       65#       70#       65#       70#       62#       3 pt Pinch 2017 2017 3/9/2018 2018   Trials Right Left Right Left Right Left  Right Left   1  2  3  Av#-       17#-       19#       19#       19#       19#       19#       19#       Lateral Pinch 2017   2017   3/9/2018   2018     Trials Right Left Right Left Right Left  Right Left   1  2  3  Av#-       21#-       20#       21#       17#       19#       17#       19#       Assessment:  Response to therapy has been improvement to:  Flexibility:  tendon gliding is  improved, improved excursion of involved muscles and less tightness in involved muscles  Work Performance:  improved  Paresthesias:  Median nerve - less intense numbness and tingling  Response to therapy has been lack of progress in:  Strength    Overall Assessment:  Patient's symptoms are resolving.  STG/LTG:  STGoals have been reviewed and some progress has been made;  see goal sheet for details and changes.    Plan:  Frequency/Duration:  Recommend continuing to see patient  1 X a month, once daily  for 3 months tapering to 1 X every two months over 3 months  Appropriateness of Rx I have re-evaluated this patient and find that the nature, scope, duration and intensity of the therapy is appropriate for the medical condition of the patient.    Recommendations for Continued Therapy    Home Exercise Program:  Foam roller massage and stretch pecs  Icing prn  Proximal median nerve glide  Massage fa and upper arm (bicep) (partner to use roller bar to massage)  Clock pec stretch  Latissimus stretch  FM LEP (bilateral)  Extensor stretches  Table top flexor stretch  tricep stretch  Table top flexor stretch  Pollok massage  Epsom salts  Can massage tricep  Core strengthening and Lower trap squeezes with nerve gliding  Trigger massage to outer elbow with flexion / extension of elbow  Neck lateral bend with median nerve glide  Use long dowel to push into tight pecs  Back extension exercises over therapy ball  Bullet prosource foam roller for deeper massage  Spiky ball massage to flexor wad and pec area  Back extension over ball  Lower abdominal strengthening-raise legs up and lower slowly  Ulnar and radial FM wrists; SCS bilateral ulnar wrists    Next Visit:  Laser  Mfr  Stretching  Nerve gliding   Posture and back strengthening

## 2018-07-13 DIAGNOSIS — G89.4 CHRONIC PAIN SYNDROME: ICD-10-CM

## 2018-07-16 ENCOUNTER — OFFICE VISIT (OUTPATIENT)
Dept: FAMILY MEDICINE | Facility: CLINIC | Age: 48
End: 2018-07-16
Payer: COMMERCIAL

## 2018-07-16 VITALS
BODY MASS INDEX: 35.64 KG/M2 | DIASTOLIC BLOOD PRESSURE: 88 MMHG | SYSTOLIC BLOOD PRESSURE: 134 MMHG | WEIGHT: 198 LBS | HEART RATE: 76 BPM | TEMPERATURE: 99.3 F | OXYGEN SATURATION: 98 %

## 2018-07-16 DIAGNOSIS — H26.9 CATARACT OF BOTH EYES, UNSPECIFIED CATARACT TYPE: ICD-10-CM

## 2018-07-16 DIAGNOSIS — Z01.818 PREOP GENERAL PHYSICAL EXAM: Primary | ICD-10-CM

## 2018-07-16 PROCEDURE — 99214 OFFICE O/P EST MOD 30 MIN: CPT | Performed by: NURSE PRACTITIONER

## 2018-07-16 RX ORDER — DULOXETIN HYDROCHLORIDE 30 MG/1
CAPSULE, DELAYED RELEASE ORAL
Qty: 90 CAPSULE | Refills: 2 | Status: SHIPPED | OUTPATIENT
Start: 2018-07-16 | End: 2019-03-12

## 2018-07-16 NOTE — TELEPHONE ENCOUNTER
Prescription approved per [facility] Refill Protocol.    Bradly JONES RN

## 2018-07-16 NOTE — MR AVS SNAPSHOT
After Visit Summary   7/16/2018    Maegan Lira    MRN: [id number]           Patient Information     Date Of Birth          1970        Visit Information        Provider Department      7/16/2018 11:30 AM Tereza Arguelles APRN CNP [facility]        Today's Diagnoses     Preop general physical exam    -  1    Cataract of both eyes, unspecified cataract type          Care Instructions      Before Your Surgery      Call your surgeon if there is any change in your health. This includes signs of a cold or flu (such as a sore throat, runny nose, cough, rash or fever).    Do not smoke, drink alcohol or take over the counter medicine (unless your surgeon or primary care doctor tells you to) for the 24 hours before and after surgery.    If you take prescribed drugs: Follow your doctor s orders about which medicines to take and which to stop until after surgery.    Eating and drinking prior to surgery: follow the instructions from your surgeon    Take a shower or bath the night before surgery. Use the soap your surgeon gave you to gently clean your skin. If you do not have soap from your surgeon, use your regular soap. Do not shave or scrub the surgery site.  Wear clean pajamas and have clean sheets on your bed.           Follow-ups after your visit        Who to contact     If you have questions or need follow up information about today's clinic visit or your schedule please contact [facility] directly at [phone].  Normal or non-critical lab and imaging results will be communicated to you by MyChart, letter or phone within 4 business days after the clinic has received the results. If you do not hear from us within 7 days, please contact the clinic through [facility] or phone. If you have a critical or abnormal lab result, we will notify you by phone as soon as possible.  Submit refill requests through [facility] or call your pharmacy and they will forward the refill request  to us. Please allow 3 business days for your refill to be completed.          Additional Information About Your Visit        MyChart Information     [employer] gives you secure access to your electronic health record. If you see a primary care provider, you can also send messages to your care team and make appointments. If you have questions, please call your primary care clinic.  If you do not have a primary care provider, please call [phone] and they will assist you.        Care EveryWhere ID     This is your Care EveryWhere ID. This could be used by other organizations to access your [place] medical records  [id number]        Your Vitals Were     Pulse Temperature Pulse Oximetry BMI (Body Mass Index)          76 99.3  F (37.4  C) (Oral) 98% 35.64 kg/m2         Blood Pressure from Last 3 Encounters:   07/16/18 134/88   04/30/18 135/89   09/01/17 (!) 140/96    Weight from Last 3 Encounters:   07/16/18 198 lb (89.8 kg)   04/30/18 196 lb 14.4 oz (89.3 kg)   09/01/17 192 lb (87.1 kg)              Today, you had the following     No orders found for display       Primary Care Provider Office Phone # Fax #    Jelly Pelaez Lyubov,  [phone] [address] [address]        Equal Access to Services     [facility]: Hadii aad ku hadasho Soomaali, waaxda luqadaha, qaybta kaalmada adeegyada, waxay idiin hayrachel paredes . So [facility] [phone].    ATENCIÓN: Si habla español, tiene a alejo disposición servicios gratuitos de asistencia lingüística. Llame al [phone].    We comply with applicable federal civil rights laws and [place] laws. We do not discriminate on the basis of race, color, national origin, age, disability, sex, sexual orientation, or gender identity.            Thank you!     Thank you for choosing [facility]  for your care. Our goal is always to provide you with excellent care. Hearing back from our patients is one way we can continue to  improve our services. Please take a few minutes to complete the written survey that you may receive in the mail after your visit with us. Thank you!             Your Updated Medication List - Protect others around you: Learn how to safely use, store and throw away your medicines at www.disposemymeds.org.          This list is accurate as of 7/16/18 11:59 PM.  Always use your most recent med list.                   Brand Name Dispense Instructions for use Diagnosis    Butalbital-APAP-Caffeine -40 MG Caps     30 capsule    Take one capsule as needed for migraine. Max one capsule daily. Do not take with opiate pain medications including oxycodone.    Migraine without status migrainosus, not intractable, unspecified migraine type       diazepam 5 MG tablet    VALIUM    30 tablet    Take 1 tablet (5 mg) by mouth every 6 hours as needed for anxiety, sleep or muscle spasms If you take this medication, do not take any other muscle relaxants.    Fibromyalgia       DULoxetine 30 MG EC capsule    CYMBALTA    90 capsule    TAKE 1 CAPSULE BY MOUTH EVERY DAY    Chronic pain syndrome       hydrOXYzine 25 MG tablet    ATARAX    60 tablet    TAKE 1 TO 2 TABLETS(25 TO 50 MG) BY MOUTH EVERY 6 HOURS AS NEEDED FOR ANXIETY    Anxiety       naproxen 375 MG tablet    NAPROSYN    60 tablet    Take 1 tablet (375 mg) by mouth 2 times daily as needed for moderate pain    Myofacial muscle pain       omeprazole 20 MG CR capsule    priLOSEC    30 capsule    TAKE 1 CAPSULE BY MOUTH DAILY AS NEEDED    Nausea       ondansetron 4 MG ODT tab    ZOFRAN-ODT    30 tablet    Take 1 tablet (4 mg) by mouth daily as needed for nausea    Nausea       senna-docusate 8.6-50 MG per tablet    KATIANA-COLACE    90 tablet    Take 1-2 tablets by mouth 2 times daily as needed for constipation    Constipation, unspecified constipation type       UNABLE TO FIND      Take 10 drops by mouth 2 times daily MEDICATION NAME: CBD Max Hemp Oil

## 2018-07-16 NOTE — TELEPHONE ENCOUNTER
"Requested Prescriptions   Pending Prescriptions Disp Refills     DULoxetine (CYMBALTA) 30 MG EC capsule [Pharmacy Med Name: DULOXETINE DR 30MG CAPSULES] 90 capsule 0    Last Written Prescription Date:  08/15/17  Last Fill Quantity: 90,  # refills: 3   Last office visit: 4/30/2018 with prescribing provider:  yes   Future Office Visit:     Sig: TAKE 1 CAPSULE BY MOUTH EVERY DAY    Serotonin-Norepinephrine Reuptake Inhibitors  Passed    7/13/2018  7:00 PM       Passed - Blood pressure under 140/90 in past 12 months    BP Readings from Last 3 Encounters:   07/16/18 134/88   04/30/18 135/89   09/01/17 (!) 140/96                Passed - Recent (12 mo) or future (30 days) visit within the authorizing provider's specialty    Patient had office visit in the last 12 months or has a visit in the next 30 days with authorizing provider or within the authorizing provider's specialty.  See \"Patient Info\" tab in inbasket, or \"Choose Columns\" in Meds & Orders section of the refill encounter.           Passed - Patient is age 18 or older       Passed - No active pregnancy on record       Passed - No positive pregnancy test in past 12 months          "

## 2018-07-30 ENCOUNTER — TELEPHONE (OUTPATIENT)
Dept: FAMILY MEDICINE | Facility: CLINIC | Age: 48
End: 2018-07-30

## 2018-07-30 NOTE — TELEPHONE ENCOUNTER
Reason for Call:  Form, our goal is to have forms completed with 72 hours, however, some forms may require a visit or additional information.    Type of letter, form or note:  disability    Who is the form from?: Patient    Where did the form come from: Patient or family brought in       What clinic location was the form placed at?: [facility]    Where the form was placed: 's Box    What number is listed as a contact on the form?: [phone]       Additional comments:     Call taken on 7/30/2018 at 2:18 PM by Jenny Casillas

## 2018-08-06 ENCOUNTER — MYC MEDICAL ADVICE (OUTPATIENT)
Dept: FAMILY MEDICINE | Facility: CLINIC | Age: 48
End: 2018-08-06

## 2018-08-10 ENCOUNTER — OFFICE VISIT (OUTPATIENT)
Dept: FAMILY MEDICINE | Facility: CLINIC | Age: 48
End: 2018-08-10
Payer: COMMERCIAL

## 2018-08-10 VITALS
OXYGEN SATURATION: 99 % | TEMPERATURE: 97.9 F | RESPIRATION RATE: 14 BRPM | WEIGHT: 198 LBS | DIASTOLIC BLOOD PRESSURE: 88 MMHG | HEART RATE: 68 BPM | BODY MASS INDEX: 35.08 KG/M2 | HEIGHT: 63 IN | SYSTOLIC BLOOD PRESSURE: 138 MMHG

## 2018-08-10 DIAGNOSIS — H53.9 VISUAL DISTURBANCE: ICD-10-CM

## 2018-08-10 DIAGNOSIS — G57.53 TARSAL TUNNEL SYNDROME OF BOTH LOWER EXTREMITIES: ICD-10-CM

## 2018-08-10 DIAGNOSIS — R03.0 ELEVATED BLOOD PRESSURE READING WITHOUT DIAGNOSIS OF HYPERTENSION: ICD-10-CM

## 2018-08-10 DIAGNOSIS — M79.7 FIBROMYALGIA: Primary | ICD-10-CM

## 2018-08-10 PROCEDURE — 99214 OFFICE O/P EST MOD 30 MIN: CPT | Performed by: INTERNAL MEDICINE

## 2018-08-10 NOTE — PATIENT INSTRUCTIONS
Goal blood pressure < 140/90 (closer to 130/80s would be ideal)  I'll fax in your forms  Get back to your activities as able

## 2018-08-10 NOTE — PROGRESS NOTES
"  SUBJECTIVE:   Maegan Lira is a 47 year old female who presents to clinic today for the following health issues:    Junie is here today with her wife Nan for annual renewal of disability paperwork through [facility]. 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 Due to her chronic pain (mostly fibromyalgia related) but also h/o tarsal tunnel syndrome and bilateral upper extremity paresthesias, she limits work to 15 hours per week and can no longer be on her feet as long as she used to be able to do and is unable to hold clipboard for long periods of time. 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 She manages her chronic pain via a regimented program of [place] occupational hand/arm therapy, holistic massage and chiropractic care. Has seen neurology, podiatry and rheumatology in the past but no longer needs to follow with those consultants. Does take Cymbalta daily and Hydroxyzine PRN. Rarely takes Tramadol (very old Rx), Tizanidine (spasms), Valium (spasms) and Butalbital (migraines).     Recently had bilateral cataract surgery that she is recovering from. Also visits dentist b/c of sicca syndrome.      Problem list and histories reviewed & adjusted, as indicated.    ROS:  Detailed as above     OBJECTIVE:     /88  Pulse 68  Temp 97.9  F (36.6  C) (Oral)  Resp 14  Ht 5' 2.5\" (1.588 m)  Wt 198 lb (89.8 kg)  LMP 08/03/2018  SpO2 99%  Breastfeeding? No  BMI 35.64 kg/m2  Body mass index is 35.64 kg/(m^2).  Alert, pleasant, NAD  Well-spoken, thoughtful  BP slightly elevated on recheck    ASSESSMENT/PLAN:       1. Fibromyalgia, Tarsal tunnel syndrome of both lower extremities, Bilateral arm paresthesias, Sicca syndrome  Forms " for [place]   She is compliant with maintenance treatment regimen    2. Elevated blood pressure reading without diagnosis of hypertension  Doesn't have a lot of Na+ in diet but will be more cognizant of this and monitor her readings    3. S/p bilateral cataract surgery  Will continue f/u with ophtho      Patient Instructions   Goal blood pressure < 140/90 (closer to 130/80s would be ideal)  I'll fax in your forms  Get back to your activities as able    MDM: >30 minutes spent with patient, over 50% time counseling, coordinating care and explaining about nature of the patient's conditions and completing her forms.    Jelly Mcarthur,   [facility]

## 2018-08-10 NOTE — MR AVS SNAPSHOT
After Visit Summary   8/10/2018    Maegan Lira    MRN: [id number]           Patient Information     Date Of Birth          1970        Visit Information        Provider Department      8/10/2018 2:30 PM Jelly Mcarthur, DO [facility]        Today's Diagnoses     Fibromyalgia    -  1    Elevated blood pressure reading without diagnosis of hypertension          Care Instructions    Goal blood pressure < 140/90 (closer to 130/80s would be ideal)  I'll fax in your forms  Get back to your activities as able          Follow-ups after your visit        Who to contact     If you have questions or need follow up information about today's clinic visit or your schedule please contact [facility] directly at [phone].  Normal or non-critical lab and imaging results will be communicated to you by [employer], letter or phone within 4 business days after the clinic has received the results. If you do not hear from us within 7 days, please contact the clinic through [employer] or phone. If you have a critical or abnormal lab result, we will notify you by phone as soon as possible.  Submit refill requests through [facility] app or call your pharmacy and they will forward the refill request to us. Please allow 3 business days for your refill to be completed.          Additional Information About Your Visit        MyChart Information     [facility] app gives you secure access to your electronic health record. If you see a primary care provider, you can also send messages to your care team and make appointments. If you have questions, please call your primary care clinic.  If you do not have a primary care provider, please call [phone] and they will assist you.        Care EveryWhere ID     This is your Care EveryWhere ID. This could be used by other organizations to access your [facility] medical records  [id number]        Your Vitals Were     Pulse Temperature Respirations Height Last Period  "Pulse Oximetry    68 97.9  F (36.6  C) (Oral) 14 5' 2.5\" (1.588 m) 08/03/2018 99%    Breastfeeding? BMI (Body Mass Index)                No 35.64 kg/m2           Blood Pressure from Last 3 Encounters:   08/10/18 138/88   07/16/18 134/88   04/30/18 135/89    Weight from Last 3 Encounters:   08/10/18 198 lb (89.8 kg)   07/16/18 198 lb (89.8 kg)   04/30/18 196 lb 14.4 oz (89.3 kg)              Today, you had the following     No orders found for display      Information about OPIOIDS     PRESCRIPTION OPIOIDS: WHAT YOU NEED TO KNOW   We gave you an opioid (narcotic) pain medicine. It is important to manage your pain, but opioids are not always the best choice. You should first try all the other options your care team gave you. Take this medicine for as short a time (and as few doses) as possible.    Some activities can increase your pain, such as bandage changes or therapy sessions. It may help to take your pain medicine 30 to 60 minutes before these activities. Reduce your stress by getting enough sleep, working on hobbies you enjoy and practicing relaxation or meditation. Talk to your care team about ways to manage your pain beyond prescription opioids.    These medicines have risks:    DO NOT drive when on new or higher doses of pain medicine. These medicines can affect your alertness and reaction times, and you could be arrested for driving under the influence (DUI). If you need to use opioids long-term, talk to your care team about driving.    DO NOT operate heavy machinery    DO NOT do any other dangerous activities while taking these medicines.    DO NOT drink any alcohol while taking these medicines.     If the opioid prescribed includes acetaminophen, DO NOT take with any other medicines that contain acetaminophen. Read all labels carefully. Look for the word  acetaminophen  or  Tylenol.  Ask your pharmacist if you have questions or are unsure.    You can get addicted to pain medicines, especially if you have a " history of addiction (chemical, alcohol or substance dependence). Talk to your care team about ways to reduce this risk.    All opioids tend to cause constipation. Drink plenty of water and eat foods that have a lot of fiber, such as fruits, vegetables, prune juice, apple juice and high-fiber cereal. Take a laxative (Miralax, milk of magnesia, Colace, Senna) if you don t move your bowels at least every other day. Other side effects include upset stomach, sleepiness, dizziness, throwing up, tolerance (needing more of the medicine to have the same effect), physical dependence and slowed breathing.    Store your pills in a secure place, locked if possible. We will not replace any lost or stolen medicine. If you don t finish your medicine, please throw away (dispose) as directed by your pharmacist. The [place] [employer] has more information about safe disposal: [email or web address]         Primary Care Provider Office Phone # Fax #    Jelly Latanya Mcarthur [phone] [address] [address]        Equal Access to Services     [facility] : Hadii aad ku hadasho Soshanikaali, waaxda luqadaha, qaybta kaalmada adeaamiryada, felicity paredes . So [facility] [phone].    ATENCIÓN: Si habla español, tiene a alejo disposición servicios gratuitos de asistencia lingüística. Llame al [phone].    We comply with applicable federal civil rights laws and [place] laws. We do not discriminate on the basis of race, color, national origin, age, disability, sex, sexual orientation, or gender identity.            Thank you!     Thank you for choosing [facility]  for your care. Our goal is always to provide you with excellent care. Hearing back from our patients is one way we can continue to improve our services. Please take a few minutes to complete the written survey that you may receive in the mail after  your visit with us. Thank you!             Your Updated Medication List - Protect others around you: Learn how to safely use, store and throw away your medicines at www.disposemymeds.org.          This list is accurate as of 8/10/18  3:43 PM.  Always use your most recent med list.                   Brand Name Dispense Instructions for use Diagnosis    Butalbital-APAP-Caffeine -40 MG Caps     30 capsule    Take one capsule as needed for migraine. Max one capsule daily. Do not take with opiate pain medications including oxycodone.    Migraine without status migrainosus, not intractable, unspecified migraine type       diazepam 5 MG tablet    VALIUM    30 tablet    Take 1 tablet (5 mg) by mouth every 6 hours as needed for anxiety, sleep or muscle spasms If you take this medication, do not take any other muscle relaxants.    Fibromyalgia       DULoxetine 30 MG EC capsule    CYMBALTA    90 capsule    TAKE 1 CAPSULE BY MOUTH EVERY DAY    Chronic pain syndrome       hydrOXYzine 25 MG tablet    ATARAX    60 tablet    TAKE 1 TO 2 TABLETS(25 TO 50 MG) BY MOUTH EVERY 6 HOURS AS NEEDED FOR ANXIETY    Anxiety       naproxen 375 MG tablet    NAPROSYN    60 tablet    Take 1 tablet (375 mg) by mouth 2 times daily as needed for moderate pain    Myofacial muscle pain       omeprazole 20 MG CR capsule    priLOSEC    30 capsule    TAKE 1 CAPSULE BY MOUTH DAILY AS NEEDED    Nausea       ondansetron 4 MG ODT tab    ZOFRAN-ODT    30 tablet    Take 1 tablet (4 mg) by mouth daily as needed for nausea    Nausea       senna-docusate 8.6-50 MG per tablet    KATIANA-COLACE    90 tablet    Take 1-2 tablets by mouth 2 times daily as needed for constipation    Constipation, unspecified constipation type       TRAMADOL HCL PO           UNABLE TO FIND      Take 10 drops by mouth 2 times daily MEDICATION NAME: CBD Max Hemp Oil        ZANAFLEX PO

## 2018-08-14 DIAGNOSIS — R11.0 NAUSEA: ICD-10-CM

## 2018-08-14 NOTE — TELEPHONE ENCOUNTER
"  omeprazole (PRILOSEC) 20 MG CR capsule 30 capsule 1 1/9/2018         Last Written Prescription Date:  01/09/2018  Last Fill Quantity: 30,  # refills: 1   Last office visit: 08/10/2018 prescribing provider:     Future Office Visit:  unknown      Requested Prescriptions   Pending Prescriptions Disp Refills     omeprazole (PRILOSEC) 20 MG CR capsule [Pharmacy Med Name: OMEPRAZOLE 20MG CAPSULES] 30 capsule 0     Sig: TAKE 1 CAPSULE BY MOUTH DAILY AS NEEDED    PPI Protocol Passed    8/14/2018 10:40 AM       Passed - Not on Clopidogrel (unless Pantoprazole ordered)       Passed - No diagnosis of osteoporosis on record       Passed - Recent (12 mo) or future (30 days) visit within the authorizing provider's specialty    Patient had office visit in the last 12 months or has a visit in the next 30 days with authorizing provider or within the authorizing provider's specialty.  See \"Patient Info\" tab in inbasket, or \"Choose Columns\" in Meds & Orders section of the refill encounter.           Passed - Patient is age 18 or older       Passed - No active pregnacy on record       Passed - No positive pregnancy test in past 12 months          "

## 2018-08-15 ENCOUNTER — MYC MEDICAL ADVICE (OUTPATIENT)
Dept: FAMILY MEDICINE | Facility: CLINIC | Age: 48
End: 2018-08-15

## 2018-08-15 NOTE — TELEPHONE ENCOUNTER
Prescription approved per [facility] Refill Protocol.  Rosemarie Rosa RN

## 2018-10-04 ENCOUNTER — THERAPY VISIT (OUTPATIENT)
Dept: OCCUPATIONAL THERAPY | Facility: CLINIC | Age: 48
End: 2018-10-04
Payer: COMMERCIAL

## 2018-10-04 DIAGNOSIS — G56.03 BILATERAL CARPAL TUNNEL SYNDROME: Primary | ICD-10-CM

## 2018-10-04 PROCEDURE — 97026 INFRARED THERAPY: CPT | Mod: GO | Performed by: OCCUPATIONAL THERAPIST

## 2018-10-04 PROCEDURE — 97112 NEUROMUSCULAR REEDUCATION: CPT | Mod: GO | Performed by: OCCUPATIONAL THERAPIST

## 2018-10-04 PROCEDURE — 97140 MANUAL THERAPY 1/> REGIONS: CPT | Mod: GO | Performed by: OCCUPATIONAL THERAPIST

## 2018-10-04 NOTE — LETTER
[facility]  [address] [address]  [phone]    2018    Re: Maegan Lira   :   1970  MRN:  [id number]   REFERRING PHYSICIAN:   Gabriela Kincaid    [facility]    Date of Initial Evaluation:  2017  Visits:  Rxs Used: 24  Reason for Referral:  Bilateral carpal tunnel syndrome    EVALUATION SUMMARY    Progress Note - Hand Therapy    Current Date:  10/4/2018    Diagnosis:  Bilateral hand pain/weakness  DOI:  2015    Number of visits to date: 23    Reporting period is 2018 to 10/4/2018    Subjectve:   Subjective changes as noted by patient: Feeling numb in hands with sleeping again.  Functional changes noted by patient:  Improvement in Self Care Tasks (dressing, eating, bathing, hygiene/toileting), Work Tasks and Recreational Activities  Patient has noted adverse reaction to:  None        Objective:  Pain Level Report  VAS(0-10) 2017 2017 6/15/2017 2017 2017 2017 2017 2017   At Rest: 7 tingling  R: 6  L: 6 7 tingling  R: 6  L: 6 7 tingling  R: 6  L: 6 7 tingling  R: 6  L: 6 6-7 tingling  R: 5  L: 5 5 tingling  R: 3  L: 3 4 tingling  R: 3  L: 3 3 tingling  R: 3  L: 3   With Use: R: 8  L: 8 R: 8  L: 8 R: 8  L: 8 R: 8  L: 8 R: 8  L: 8 R: 6-7  L: 6-7 R: 6  L: 6 R: 5  L: 5     VAS(0-10) 2017 10/5/2017 10/19/17 2017 2017 11/14/17 11/30/17 2017   At Rest: 3 tingling  R: 3  L: 3 3 tingling  R: 3  L: 3 5 tingling  R: 5  L: 5 7 tingling  R: 7  L: 7 6 tingling  R: 5  L: 5 6 tingling  R: 5  L: 5 6 tingling  R: 5  L: 5 6 tingling  R: 5  L: 5   With Use: R: 5  L: 5 R: 5-6  R elbow 6-8  L: 5-6 R: 7-8  R elbow 3  L: 6 R: 9  R elbow 3  L: 9 R: 8  R elbow 1  L: 8 R: 8  R elbow 1  L: 8 R: 8  R elbow 1  L: 8 R: 8  B elbows 2  L: 8     VAS(0-10) 3/9/2018 2018 2018 5/10/2018 2018 2018 2018 10/4/2018   At Rest: 3-4 tingling  R: 3  L: 3 4 tingling  R: 5  L: 3 0-1  tingling  R: 1  L: 1 0  tingling  R: 0  L: 0 R: 0  L: 0 R: 0  L: 0 R: 0  L: 0 R: 0  L: 0   With Use: R: 5  B elbows 0  L: 5 R: 5  B elbows 0  L: 5 R: 0-1  B elbows 0  L: 1-2 R: 0-1  B elbows 0  L: 1-2 R: wrist pain 5/10 bilateral R: wrist pain 5/10 bilateral R: wrist pain 3/10 bilateral;  Elbow pain (medial) 5/10 R: wrist pain 3/10 bilateral;  Elbow pain (medial) 2/10       Report of Pain:  Location:  All fingers  Description:  pain  Frequency:  constant    Duration:  lingers  Exacerbated by:  use  Relieved by:  meds (somewhat)  Progression:improving    Special Tests:   Date 6/1/2017 6/29/2017 9/1/2017 11/30/2017 3/9/2018    Left Right Left Right Left Right Left Right Left Right   Tinels CT             Tinels PIN             Tinels DSRN             Tinels cubital tunnel             Tinels [place]             Phalen's   + + - + - - - -     Elbow Flexion Test   + + - - - - - + + R,S + R,S     Date 5/31/2018 7/12/2018 10/4/2018      Left Right Left Right Left Right Left Right Left Right   Tinels CT             Tinels PIN             Tinels DSRN             Tinels cubital tunnel             Tinels [place]             Phalen's               Elbow Flexion Test   + R, S + R, S + R, S + R, S - -           ULTT ( % of full glide )  Date 6/1/2017 6/8/17 6/20/2017 6/29/2017 9/1/2017 9/14/2017   Median nerve                 Ulnar nerve    Radial nerve  R:  + at 90 degrees arm abd and wrist extended  L:  + at 60 with wrist extended  R:  + at 90 degrees arm abd and wrist extended  L:  + at 60 with wrist extended R:  + at 60 degrees arm abd and wrist straight  L:  + at 60 with wrist extended R:  + at 45 degrees arm abd and wrist straight  L:  + at 45 with wrist extended R:  + at 80 degrees arm abd and wrist straight  L:  + at 80 with wrist extended R:  + at 80 degrees arm abd and wrist straight  L:  + at 60 with wrist extended     Date 9/21/2017 9/28/2017 10/5/2017 11/2/2017 11/9/2017 11/14/2017   Median nerve                    Ulnar nerve    Radial nerve  R:  + at 90 degrees arm abd and wrist extended  L:  + at 70 with wrist extended  R:  + at 90 degrees arm abd and wrist extended  L:  + at 90 with wrist extended  R:  + at 80 degrees arm abd and wrist extended  L:  + at 80 with wrist extended  R:  + at 90 degrees arm abd and wrist neutral  L:  + at 60 with wrist extended R:  + at 90 degrees arm abd and wrist 45 degree extension  L:  + at 90 with wrist extended fully R:  + at 90 degrees arm abd and wrist 45 degree extension  L:  + at 90 with wrist extended fully     Date 11/30/2017 12/7/2017 3/9/2018 4/12/2018 4/27/2018 5/10/2018   Median nerve                     Ulnar nerve    Radial nerve  R:  + at 45 degrees arm abd and wrist neutral  L:  + at 90 with wrist neutral R:  Full nerve glide  L:  Full nerve glide R:  + at 90 degrees arm abd and wrist  Slightly extended  L:  + at 90 with wrist slightly extended R:  + at 90 degrees arm abd and wrist  neutral  L:  + at 70 with wrist neutral R:  + at 90 degrees arm abd and wrist  Fully extended  L:  + at 90 with wrist fully extended R:  + at 90 degrees arm abd and wrist  Fully extended  L:  + at 90 with wrist fully extended     Date 5/31/2018 6/21/2018 7/12/2018 10/4/2018     Median nerve                     Ulnar nerve    Radial nerve  R:  + at 90 degrees arm abd and wrist  Fully extended  L:  + at 90 with wrist fully extended R:  + at 70 degrees arm abd and wrist  Fully extended  L:  + at 70 with wrist fully extended R:  + at 90 degrees arm abd and wrist  Fully extended  L:  + at 90 with wrist fully extended R:  + at 80 degrees arm abd and wrist  partially extended  L:  + at 80 with wrist partially extended         Cervical Screen: (+ or -)  Date 6/1/2017 3/9/2018      Active Active       Flexion - -      Extension - -      Lateral Flexion  Right - -     Lateral Flexion  Left - -     Rotation Right heaviness -     Rotation Left heaviness -     Compression Neck - -       TOS  Special Tests  Date 2017 2017 2017 2017 2017 3/9/2018    Left Right Left Right Left Right Left Right Left Right Left Right   Inderjit Test + + + + - + small + + + after 35 sec + after 35 sec - + after 10 sec   Costoclavicular Test + + + + + after45 sec + after 15 sec small and ring - + after 45 sec + after 20 sec + after 20 sec + after 20 sec + after 10 sec   Rae's Test               Adson's Test                 Date 2018   2018   10/4/2018                  Inderjit Test - -   - -       Costoclavicular Test Full feeling Full feeling Full feeling Full feeling Full feeling Full feeling       Rae's Test             Adson's Test                   Posture:  []              Normal   []             Forward Neck Posture  [x]             Rounded Forward Shoulders  []             Shoulder Height Difference  Comments:    Patient's proximal musculature at shoulder appears imbalanced with tight pectoralis muscles, subscapularis, upper trapezius, lattisimus and weak scapular stabilizers.  This pattern contributes to overuse of distal musculature.    Edema:  [x]             None  []              Mild   []             Moderate  []              Severe     []              of affected part      Sensation: []              WNL throughout all nerve distributions; per patient report    [x]              Decreased  [x]              Median    [x]             Ulnar    []             Radial nerve distribution    []              See Sensory Evaluation    Description:    STRENGTH:   (Measured in pounds)     2017   2017   2017 10/19/2017     Trials Right Left Right Left Right Left  Right Left   1  2  3  Av#-       65#-       65#       68#       70#       71#       65#       75#      2017 3/9/2018 2018 2018   Trials Right Left Right Left Right Left  Right Left   1  2  3  Av#       70#       70#       65#       70#       65#       70#       62#       10/4/2018      Trials Right Left Right Left Right Left  Right Left   1  2  3  Av#       65#             3 pt Pinch 2017 2017 3/9/2018 2018   Trials Right Left Right Left Right Left  Right Left   1  2  3  Av#-       17#-       19#       19#       19#       19#       19#       19#     3 pt Pinch 10/4/2018      Trials Right Left Right Left Right Left  Right Left   1  2  3  Av#-       19#-             Lateral Pinch 2017   2017   3/9/2018   2018     Trials Right Left Right Left Right Left  Right Left   1  2  3  Av#-       21#-       20#       21#       17#       19#       17#       19#     Lateral Pinch 10/4/2018      Trials Right Left Right Left Right Left  Right Left   1  2  3  Av#       19#             Assessment:  Response to therapy has been improvement to:  Flexibility:  Slight exacerbation in UE tightness which appears to be causing distal numbness symptoms.  Work Performance:  improved  Paresthesias:  Median nerve - increase the last week.  Response to therapy has been improvement in strength.  Overall Assessment:  Patient's symptoms are resolving.  STG/LTG:  STGoals have been reviewed and some progress has been made;  see goal sheet for details and changes.    Plan:  Frequency/Duration:  Recommend continuing to see patient  1 X a month, once daily  for 3 months tapering to 1 X every two months over 3 months  Appropriateness of Rx I have re-evaluated this patient and find that the nature, scope, duration and intensity of the therapy is appropriate for the medical condition of the patient.    Recommendations for Continued Therapy    Home Exercise Program:  Foam roller massage and stretch pecs  Icing prn  Proximal median nerve glide  Massage fa and upper arm (bicep) (partner to use roller bar to massage)  Clock pec stretch  Latissimus stretch  FM LEP (bilateral)  Extensor stretches  Table top flexor stretch  tricep stretch  Table  top flexor stretch  [place] massage  Epsom salts  Can massage tricep  Core strengthening and Lower trap squeezes with nerve gliding  Trigger massage to outer elbow with flexion / extension of elbow  Neck lateral bend with median nerve glide  Use long dowel to push into tight pecs  Back extension exercises over therapy ball  Bullet prosource foam roller for deeper massage  Spiky ball massage to flexor wad and pec area  Back extension over ball  Lower abdominal strengthening-raise legs up and lower slowly  Ulnar and radial FM wrists; SCS bilateral ulnar wrists    Next Visit:  Laser  Mfr  Stretching  Nerve gliding   Posture and back strengthening              Thank you for your referral.    INQUIRIES  Therapist: KYLE Silva/WILLARD, CHT  [address] [address]  Phone: [phone]  Fax: [phone]

## 2018-10-04 NOTE — PROGRESS NOTES
Progress Note - Hand Therapy    Current Date:  10/4/2018    Diagnosis:  Bilateral hand pain/weakness  DOI:  October 2015    Number of visits to date: 23    Reporting period is 7/12/2018 to 10/4/2018    Subjectve:   Subjective changes as noted by patient: Feeling numb in hands with sleeping again.  Functional changes noted by patient:  Improvement in Self Care Tasks (dressing, eating, bathing, hygiene/toileting), Work Tasks and Recreational Activities  Patient has noted adverse reaction to:  None        Objective:  Pain Level Report  VAS(0-10) 6/1/2017 6/8/2017 6/15/2017 6/20/2017 6/29/2017 9/1/2017 9/14/2017 9/21/2017   At Rest: 7 tingling  R: 6  L: 6 7 tingling  R: 6  L: 6 7 tingling  R: 6  L: 6 7 tingling  R: 6  L: 6 6-7 tingling  R: 5  L: 5 5 tingling  R: 3  L: 3 4 tingling  R: 3  L: 3 3 tingling  R: 3  L: 3   With Use: R: 8  L: 8 R: 8  L: 8 R: 8  L: 8 R: 8  L: 8 R: 8  L: 8 R: 6-7  L: 6-7 R: 6  L: 6 R: 5  L: 5     VAS(0-10) 9/28/2017 10/5/2017 10/19/17 11/2/2017 11/9/2017 11/14/17 11/30/17 12/7/2017   At Rest: 3 tingling  R: 3  L: 3 3 tingling  R: 3  L: 3 5 tingling  R: 5  L: 5 7 tingling  R: 7  L: 7 6 tingling  R: 5  L: 5 6 tingling  R: 5  L: 5 6 tingling  R: 5  L: 5 6 tingling  R: 5  L: 5   With Use: R: 5  L: 5 R: 5-6  R elbow 6-8  L: 5-6 R: 7-8  R elbow 3  L: 6 R: 9  R elbow 3  L: 9 R: 8  R elbow 1  L: 8 R: 8  R elbow 1  L: 8 R: 8  R elbow 1  L: 8 R: 8  B elbows 2  L: 8     VAS(0-10) 3/9/2018 4/12/2018 4/27/2018 5/10/2018 5/31/2018 6/21/2018 7/12/2018 10/4/2018   At Rest: 3-4 tingling  R: 3  L: 3 4 tingling  R: 5  L: 3 0-1 tingling  R: 1  L: 1 0  tingling  R: 0  L: 0 R: 0  L: 0 R: 0  L: 0 R: 0  L: 0 R: 0  L: 0   With Use: R: 5  B elbows 0  L: 5 R: 5  B elbows 0  L: 5 R: 0-1  B elbows 0  L: 1-2 R: 0-1  B elbows 0  L: 1-2 R: wrist pain 5/10 bilateral R: wrist pain 5/10 bilateral R: wrist pain 3/10 bilateral;  Elbow pain (medial) 5/10 R: wrist pain 3/10 bilateral;  Elbow pain (medial) 2/10       Report of  Pain:  Location:  All fingers  Description:  pain  Frequency:  constant    Duration:  lingers  Exacerbated by:  use  Relieved by:  meds (somewhat)  Progression:improving    Special Tests:   Date 6/1/2017 6/29/2017 9/1/2017 11/30/2017 3/9/2018    Left Right Left Right Left Right Left Right Left Right   Tinels CT             Tinels PIN             Tinels DSRN             Tinels cubital tunnel             Tinels [place]             Phalen's   + + - + - - - -     Elbow Flexion Test   + + - - - - - + + R,S + R,S     Date 5/31/2018 7/12/2018 10/4/2018      Left Right Left Right Left Right Left Right Left Right   Tinels CT             Tinels PIN             Tinels DSRN             Tinels cubital tunnel             Tinels [place]             Phalen's               Elbow Flexion Test   + R, S + R, S + R, S + R, S - -           ULTT ( % of full glide )  Date 6/1/2017 6/8/17 6/20/2017 6/29/2017 9/1/2017 9/14/2017   Median nerve                 Ulnar nerve    Radial nerve  R:  + at 90 degrees arm abd and wrist extended  L:  + at 60 with wrist extended  R:  + at 90 degrees arm abd and wrist extended  L:  + at 60 with wrist extended R:  + at 60 degrees arm abd and wrist straight  L:  + at 60 with wrist extended R:  + at 45 degrees arm abd and wrist straight  L:  + at 45 with wrist extended R:  + at 80 degrees arm abd and wrist straight  L:  + at 80 with wrist extended R:  + at 80 degrees arm abd and wrist straight  L:  + at 60 with wrist extended     Date 9/21/2017 9/28/2017 10/5/2017 11/2/2017 11/9/2017 11/14/2017   Median nerve                   Ulnar nerve    Radial nerve  R:  + at 90 degrees arm abd and wrist extended  L:  + at 70 with wrist extended  R:  + at 90 degrees arm abd and wrist extended  L:  + at 90 with wrist extended  R:  + at 80 degrees arm abd and wrist extended  L:  + at 80 with wrist extended  R:  + at 90 degrees arm abd and wrist neutral  L:  + at 60 with wrist extended R:  + at 90 degrees arm abd  and wrist 45 degree extension  L:  + at 90 with wrist extended fully R:  + at 90 degrees arm abd and wrist 45 degree extension  L:  + at 90 with wrist extended fully     Date 11/30/2017 12/7/2017 3/9/2018 4/12/2018 4/27/2018 5/10/2018   Median nerve                     Ulnar nerve    Radial nerve  R:  + at 45 degrees arm abd and wrist neutral  L:  + at 90 with wrist neutral R:  Full nerve glide  L:  Full nerve glide R:  + at 90 degrees arm abd and wrist  Slightly extended  L:  + at 90 with wrist slightly extended R:  + at 90 degrees arm abd and wrist  neutral  L:  + at 70 with wrist neutral R:  + at 90 degrees arm abd and wrist  Fully extended  L:  + at 90 with wrist fully extended R:  + at 90 degrees arm abd and wrist  Fully extended  L:  + at 90 with wrist fully extended     Date 5/31/2018 6/21/2018 7/12/2018 10/4/2018     Median nerve                     Ulnar nerve    Radial nerve  R:  + at 90 degrees arm abd and wrist  Fully extended  L:  + at 90 with wrist fully extended R:  + at 70 degrees arm abd and wrist  Fully extended  L:  + at 70 with wrist fully extended R:  + at 90 degrees arm abd and wrist  Fully extended  L:  + at 90 with wrist fully extended R:  + at 80 degrees arm abd and wrist  partially extended  L:  + at 80 with wrist partially extended         Cervical Screen: (+ or -)  Date 6/1/2017 3/9/2018      Active Active       Flexion - -      Extension - -      Lateral Flexion  Right - -     Lateral Flexion  Left - -     Rotation Right heaviness -     Rotation Left heaviness -     Compression Neck - -       TOS Special Tests  Date 6/1/2017 6/29/2017 9/1/2017 11/9/2017 11/30/2017 3/9/2018    Left Right Left Right Left Right Left Right Left Right Left Right   Inderjit Test + + + + - + small + + + after 35 sec + after 35 sec - + after 10 sec   Costoclavicular Test + + + + + after45 sec + after 15 sec small and ring - + after 45 sec + after 20 sec + after 20 sec + after 20 sec + after 10 sec   Rae's Test                Ricky's Test                 Date 2018   2018   10/4/2018                  Inderjit Test - -   - -       Costoclavicular Test Full feeling Full feeling Full feeling Full feeling Full feeling Full feeling       Rae's Test             Bradon's Test                   Posture:  []              Normal   []             Forward Neck Posture  [x]             Rounded Forward Shoulders  []             Shoulder Height Difference  Comments:    Patient's proximal musculature at shoulder appears imbalanced with tight pectoralis muscles, subscapularis, upper trapezius, lattisimus and weak scapular stabilizers.  This pattern contributes to overuse of distal musculature.    Edema:  [x]             None  []              Mild   []             Moderate  []              Severe     []              of affected part      Sensation: []              WNL throughout all nerve distributions; per patient report    [x]              Decreased  [x]              Median    [x]             Ulnar    []             Radial nerve distribution    []              See Sensory Evaluation    Description:    STRENGTH:   (Measured in pounds)     2017   2017   2017 10/19/2017     Trials Right Left Right Left Right Left  Right Left   1  2  3  Av#-       65#-       65#       68#       70#       71#       65#       75#      2017 3/9/2018 2018 2018   Trials Right Left Right Left Right Left  Right Left   1  2  3  Av#       70#       70#       65#       70#       65#       70#       62#      10/4/2018      Trials Right Left Right Left Right Left  Right Left   1  2  3  Av#       65#             3 pt Pinch 2017 2017 3/9/2018 2018   Trials Right Left Right Left Right Left  Right Left   1  2  3  Av#-       17#-       19#       19#       19#       19#       19#       19#     3 pt Pinch 10/4/2018      Trials Right Left Right Left Right Left  Right Left    1  2  3  Av#-       19#-             Lateral Pinch 2017   2017   3/9/2018   2018     Trials Right Left Right Left Right Left  Right Left   1  2  3  Av#-       21#-       20#       21#       17#       19#       17#       19#     Lateral Pinch 10/4/2018      Trials Right Left Right Left Right Left  Right Left   1  2  3  Av#       19#             Assessment:  Response to therapy has been improvement to:  Flexibility:  Slight exacerbation in UE tightness which appears to be causing distal numbness symptoms.  Work Performance:  improved  Paresthesias:  Median nerve - increase the last week.  Response to therapy has been improvement in strength.  Overall Assessment:  Patient's symptoms are resolving.  STG/LTG:  STGoals have been reviewed and some progress has been made;  see goal sheet for details and changes.    Plan:  Frequency/Duration:  Recommend continuing to see patient  1 X a month, once daily  for 3 months tapering to 1 X every two months over 3 months  Appropriateness of Rx I have re-evaluated this patient and find that the nature, scope, duration and intensity of the therapy is appropriate for the medical condition of the patient.    Recommendations for Continued Therapy    Home Exercise Program:  Foam roller massage and stretch pecs  Icing prn  Proximal median nerve glide  Massage fa and upper arm (bicep) (partner to use roller bar to massage)  Clock pec stretch  Latissimus stretch  FM LEP (bilateral)  Extensor stretches  Table top flexor stretch  tricep stretch  Table top flexor stretch  [place] massage  Epsom salts  Can massage tricep  Core strengthening and Lower trap squeezes with nerve gliding  Trigger massage to outer elbow with flexion / extension of elbow  Neck lateral bend with median nerve glide  Use long dowel to push into tight pecs  Back extension exercises over therapy ball  Bullet prosource foam roller for deeper massage  Spiky ball massage to  flexor wad and pec area  Back extension over ball  Lower abdominal strengthening-raise legs up and lower slowly  Ulnar and radial FM wrists; SCS bilateral ulnar wrists    Next Visit:  Laser  Mfr  Stretching  Nerve gliding   Posture and back strengthening             06-Apr-2018

## 2018-10-11 ENCOUNTER — THERAPY VISIT (OUTPATIENT)
Dept: OCCUPATIONAL THERAPY | Facility: CLINIC | Age: 48
End: 2018-10-11
Payer: COMMERCIAL

## 2018-10-11 DIAGNOSIS — G56.03 BILATERAL CARPAL TUNNEL SYNDROME: ICD-10-CM

## 2018-10-11 PROCEDURE — 97112 NEUROMUSCULAR REEDUCATION: CPT | Mod: GO | Performed by: OCCUPATIONAL THERAPIST

## 2018-10-11 PROCEDURE — 97140 MANUAL THERAPY 1/> REGIONS: CPT | Mod: GO | Performed by: OCCUPATIONAL THERAPIST

## 2018-10-11 PROCEDURE — 97026 INFRARED THERAPY: CPT | Mod: GO | Performed by: OCCUPATIONAL THERAPIST

## 2018-10-11 NOTE — PROGRESS NOTES
SOAP note objective information for 10/11/2018.    Please refer to the daily flowsheet for treatment today, total treatment time and time spent performing 1:1 timed codes.          Objective:  Pain Level Report  VAS(0-10) 6/1/2017 6/8/2017 6/15/2017 6/20/2017 6/29/2017 9/1/2017 9/14/2017 9/21/2017   At Rest: 7 tingling  R: 6  L: 6 7 tingling  R: 6  L: 6 7 tingling  R: 6  L: 6 7 tingling  R: 6  L: 6 6-7 tingling  R: 5  L: 5 5 tingling  R: 3  L: 3 4 tingling  R: 3  L: 3 3 tingling  R: 3  L: 3   With Use: R: 8  L: 8 R: 8  L: 8 R: 8  L: 8 R: 8  L: 8 R: 8  L: 8 R: 6-7  L: 6-7 R: 6  L: 6 R: 5  L: 5     VAS(0-10) 9/28/2017 10/5/2017 10/19/17 11/2/2017 11/9/2017 11/14/17 11/30/17 12/7/2017   At Rest: 3 tingling  R: 3  L: 3 3 tingling  R: 3  L: 3 5 tingling  R: 5  L: 5 7 tingling  R: 7  L: 7 6 tingling  R: 5  L: 5 6 tingling  R: 5  L: 5 6 tingling  R: 5  L: 5 6 tingling  R: 5  L: 5   With Use: R: 5  L: 5 R: 5-6  R elbow 6-8  L: 5-6 R: 7-8  R elbow 3  L: 6 R: 9  R elbow 3  L: 9 R: 8  R elbow 1  L: 8 R: 8  R elbow 1  L: 8 R: 8  R elbow 1  L: 8 R: 8  B elbows 2  L: 8     VAS(0-10) 3/9/2018 4/12/2018 4/27/2018 5/10/2018 5/31/2018 6/21/2018 7/12/2018 10/4/2018   At Rest: 3-4 tingling  R: 3  L: 3 4 tingling  R: 5  L: 3 0-1 tingling  R: 1  L: 1 0  tingling  R: 0  L: 0 R: 0  L: 0 R: 0  L: 0 R: 0  L: 0 R: 0  L: 0   With Use: R: 5  B elbows 0  L: 5 R: 5  B elbows 0  L: 5 R: 0-1  B elbows 0  L: 1-2 R: 0-1  B elbows 0  L: 1-2 R: wrist pain 5/10 bilateral R: wrist pain 5/10 bilateral R: wrist pain 3/10 bilateral;  Elbow pain (medial) 5/10 R: wrist pain 3/10 bilateral;  Elbow pain (medial) 2/10     VAS(0-10) 10/11/18          At Rest: R: 0  L: 0          With Use: R: wrist pain 0/10;  L: arm pain 5/10              Report of Pain:  Location:  All fingers  Description:  pain  Frequency:  constant    Duration:  lingers  Exacerbated by:  use  Relieved by:  meds (somewhat)  Progression:improving    Special Tests:   Date 6/1/2017 6/29/2017  9/1/2017 11/30/2017 3/9/2018    Left Right Left Right Left Right Left Right Left Right   Tinels CT             Tinels PIN             Tinels DSRN             Tinels cubital tunnel             Tinels [place]             Phalen's   + + - + - - - -     Elbow Flexion Test   + + - - - - - + + R,S + R,S     Date 5/31/2018 7/12/2018 10/4/2018      Left Right Left Right Left Right Left Right Left Right   Tinels CT             Tinels PIN             Tinels DSRN             Tinels cubital tunnel             Tinels [place]             Phalen's               Elbow Flexion Test   + R, S + R, S + R, S + R, S - -           ULTT ( % of full glide )  Date 6/1/2017 6/8/17 6/20/2017 6/29/2017 9/1/2017 9/14/2017   Median nerve                 Ulnar nerve    Radial nerve  R:  + at 90 degrees arm abd and wrist extended  L:  + at 60 with wrist extended  R:  + at 90 degrees arm abd and wrist extended  L:  + at 60 with wrist extended R:  + at 60 degrees arm abd and wrist straight  L:  + at 60 with wrist extended R:  + at 45 degrees arm abd and wrist straight  L:  + at 45 with wrist extended R:  + at 80 degrees arm abd and wrist straight  L:  + at 80 with wrist extended R:  + at 80 degrees arm abd and wrist straight  L:  + at 60 with wrist extended     Date 9/21/2017 9/28/2017 10/5/2017 11/2/2017 11/9/2017 11/14/2017   Median nerve                   Ulnar nerve    Radial nerve  R:  + at 90 degrees arm abd and wrist extended  L:  + at 70 with wrist extended  R:  + at 90 degrees arm abd and wrist extended  L:  + at 90 with wrist extended  R:  + at 80 degrees arm abd and wrist extended  L:  + at 80 with wrist extended  R:  + at 90 degrees arm abd and wrist neutral  L:  + at 60 with wrist extended R:  + at 90 degrees arm abd and wrist 45 degree extension  L:  + at 90 with wrist extended fully R:  + at 90 degrees arm abd and wrist 45 degree extension  L:  + at 90 with wrist extended fully     Date 11/30/2017 12/7/2017 3/9/2018 4/12/2018  4/27/2018 5/10/2018   Median nerve                     Ulnar nerve    Radial nerve  R:  + at 45 degrees arm abd and wrist neutral  L:  + at 90 with wrist neutral R:  Full nerve glide  L:  Full nerve glide R:  + at 90 degrees arm abd and wrist  Slightly extended  L:  + at 90 with wrist slightly extended R:  + at 90 degrees arm abd and wrist  neutral  L:  + at 70 with wrist neutral R:  + at 90 degrees arm abd and wrist  Fully extended  L:  + at 90 with wrist fully extended R:  + at 90 degrees arm abd and wrist  Fully extended  L:  + at 90 with wrist fully extended     Date 5/31/2018 6/21/2018 7/12/2018 10/4/2018 10/11/2018    Median nerve                     Ulnar nerve    Radial nerve  R:  + at 90 degrees arm abd and wrist  Fully extended  L:  + at 90 with wrist fully extended R:  + at 70 degrees arm abd and wrist  Fully extended  L:  + at 70 with wrist fully extended R:  + at 90 degrees arm abd and wrist  Fully extended  L:  + at 90 with wrist fully extended R:  + at 80 degrees arm abd and wrist  partially extended  L:  + at 80 with wrist partially extended R:  + at 80 degrees arm abd and wrist  partially extended  L:  + at 90 with wrist fully extended        Cervical Screen: (+ or -)  Date 6/1/2017 3/9/2018      Active Active       Flexion - -      Extension - -      Lateral Flexion  Right - -     Lateral Flexion  Left - -     Rotation Right heaviness -     Rotation Left heaviness -     Compression Neck - -       TOS Special Tests  Date 6/1/2017 6/29/2017 9/1/2017 11/9/2017 11/30/2017 3/9/2018    Left Right Left Right Left Right Left Right Left Right Left Right   Inderjit Test + + + + - + small + + + after 35 sec + after 35 sec - + after 10 sec   Costoclavicular Test + + + + + after45 sec + after 15 sec small and ring - + after 45 sec + after 20 sec + after 20 sec + after 20 sec + after 10 sec   Rae's Test               Adson's Test                 Date 5/31/2018   7/12/2018   10/4/2018                  Inderjit Test -  -   - -       Costoclavicular Test Full feeling Full feeling Full feeling Full feeling Full feeling Full feeling       Rae's Test             Adson's Test                   Posture:  []              Normal   []             Forward Neck Posture  [x]             Rounded Forward Shoulders  []             Shoulder Height Difference  Comments:    Patient's proximal musculature at shoulder appears imbalanced with tight pectoralis muscles, subscapularis, upper trapezius, lattisimus and weak scapular stabilizers.  This pattern contributes to overuse of distal musculature.    Edema:  [x]             None  []              Mild   []             Moderate  []              Severe     []              of affected part      Sensation: []              WNL throughout all nerve distributions; per patient report    [x]              Decreased  [x]              Median    [x]             Ulnar    []             Radial nerve distribution    []              See Sensory Evaluation    Description:    STRENGTH:   (Measured in pounds)     2017   2017   2017 10/19/2017     Trials Right Left Right Left Right Left  Right Left   1  2  3  Av#-       65#-       65#       68#       70#       71#       65#       75#      2017 3/9/2018 2018 2018   Trials Right Left Right Left Right Left  Right Left   1  2  3  Av#       70#       70#       65#       70#       65#       70#       62#      10/4/2018      Trials Right Left Right Left Right Left  Right Left   1  2  3  Av#       65#             3 pt Pinch 2017 2017 3/9/2018 2018   Trials Right Left Right Left Right Left  Right Left   1  2  3  Av#-       17#-       19#       19#       19#       19#       19#       19#     3 pt Pinch 10/4/2018      Trials Right Left Right Left Right Left  Right Left   1  2  3  Av#-       19#-             Lateral Pinch 2017   2017   3/9/2018   2018      Trials Right Left Right Left Right Left  Right Left   1  2  3  Av#-       21#-       20#       21#       17#       19#       17#       19#     Lateral Pinch 10/4/2018      Trials Right Left Right Left Right Left  Right Left   1  2  3  Av#       19#           Home Exercise Program:  Foam roller massage and stretch pecs  Icing prn  Proximal median nerve glide  Massage fa and upper arm (bicep) (partner to use roller bar to massage)  Clock pec stretch  Latissimus stretch  FM LEP (bilateral)  Extensor stretches  Table top flexor stretch  tricep stretch  Table top flexor stretch  [place] massage  Epsom salts  Can massage tricep  Core strengthening and Lower trap squeezes with nerve gliding  Trigger massage to outer elbow with flexion / extension of elbow  Neck lateral bend with median nerve glide  Use long dowel to push into tight pecs  Back extension exercises over therapy ball  Bullet prosource foam roller for deeper massage  Spiky ball massage to flexor wad and pec area  Back extension over ball  Lower abdominal strengthening-raise legs up and lower slowly  Ulnar and radial FM wrists; SCS bilateral ulnar wrists    Next Visit:  Laser  Mfr  Stretching  Nerve gliding   Posture and back strengthening

## 2018-10-17 ENCOUNTER — THERAPY VISIT (OUTPATIENT)
Dept: OCCUPATIONAL THERAPY | Facility: CLINIC | Age: 48
End: 2018-10-17
Payer: COMMERCIAL

## 2018-10-17 DIAGNOSIS — G56.03 BILATERAL CARPAL TUNNEL SYNDROME: ICD-10-CM

## 2018-10-17 PROCEDURE — 97026 INFRARED THERAPY: CPT | Mod: GO | Performed by: OCCUPATIONAL THERAPIST

## 2018-10-17 PROCEDURE — 97112 NEUROMUSCULAR REEDUCATION: CPT | Mod: GO | Performed by: OCCUPATIONAL THERAPIST

## 2018-10-17 PROCEDURE — 97110 THERAPEUTIC EXERCISES: CPT | Mod: GO | Performed by: OCCUPATIONAL THERAPIST

## 2018-10-17 PROCEDURE — 97140 MANUAL THERAPY 1/> REGIONS: CPT | Mod: GO | Performed by: OCCUPATIONAL THERAPIST

## 2018-10-17 NOTE — PROGRESS NOTES
SOAP note objective information for 10/17/2018    Please refer to the daily flowsheet for treatment today, total treatment time and time spent performing 1:1 timed codes.          Objective:  Pain Level Report  VAS(0-10) 6/1/2017 6/8/2017 6/15/2017 6/20/2017 6/29/2017 9/1/2017 9/14/2017 9/21/2017   At Rest: 7 tingling  R: 6  L: 6 7 tingling  R: 6  L: 6 7 tingling  R: 6  L: 6 7 tingling  R: 6  L: 6 6-7 tingling  R: 5  L: 5 5 tingling  R: 3  L: 3 4 tingling  R: 3  L: 3 3 tingling  R: 3  L: 3   With Use: R: 8  L: 8 R: 8  L: 8 R: 8  L: 8 R: 8  L: 8 R: 8  L: 8 R: 6-7  L: 6-7 R: 6  L: 6 R: 5  L: 5     VAS(0-10) 9/28/2017 10/5/2017 10/19/17 11/2/2017 11/9/2017 11/14/17 11/30/17 12/7/2017   At Rest: 3 tingling  R: 3  L: 3 3 tingling  R: 3  L: 3 5 tingling  R: 5  L: 5 7 tingling  R: 7  L: 7 6 tingling  R: 5  L: 5 6 tingling  R: 5  L: 5 6 tingling  R: 5  L: 5 6 tingling  R: 5  L: 5   With Use: R: 5  L: 5 R: 5-6  R elbow 6-8  L: 5-6 R: 7-8  R elbow 3  L: 6 R: 9  R elbow 3  L: 9 R: 8  R elbow 1  L: 8 R: 8  R elbow 1  L: 8 R: 8  R elbow 1  L: 8 R: 8  B elbows 2  L: 8     VAS(0-10) 3/9/2018 4/12/2018 4/27/2018 5/10/2018 5/31/2018 6/21/2018 7/12/2018 10/4/2018   At Rest: 3-4 tingling  R: 3  L: 3 4 tingling  R: 5  L: 3 0-1 tingling  R: 1  L: 1 0  tingling  R: 0  L: 0 R: 0  L: 0 R: 0  L: 0 R: 0  L: 0 R: 0  L: 0   With Use: R: 5  B elbows 0  L: 5 R: 5  B elbows 0  L: 5 R: 0-1  B elbows 0  L: 1-2 R: 0-1  B elbows 0  L: 1-2 R: wrist pain 5/10 bilateral R: wrist pain 5/10 bilateral R: wrist pain 3/10 bilateral;  Elbow pain (medial) 5/10 R: wrist pain 3/10 bilateral;  Elbow pain (medial) 2/10     VAS(0-10) 10/11/18 10/17/18         At Rest: R: 0  L: 0 R: 0  L: 0         With Use: R: wrist pain 0/10;  L: arm pain 5/10 R: wrist pain 0/10;  L: arm pain 3/10             Report of Pain:  Location:  All fingers  Description:  pain  Frequency:  constant    Duration:  lingers  Exacerbated by:  use  Relieved by:  meds  (somewhat)  Progression:improving    Special Tests:   Date 6/1/2017 6/29/2017 9/1/2017 11/30/2017 3/9/2018    Left Right Left Right Left Right Left Right Left Right   Tinels CT             Tinels PIN             Tinels DSRN             Tinels cubital tunnel             Tinels [place]             Phalen's   + + - + - - - -     Elbow Flexion Test   + + - - - - - + + R,S + R,S     Date 5/31/2018 7/12/2018 10/4/2018      Left Right Left Right Left Right Left Right Left Right   Tinels CT             Tinels PIN             Tinels DSRN             Tinels cubital tunnel             Tinels [place]             Phalen's               Elbow Flexion Test   + R, S + R, S + R, S + R, S - -           ULTT ( % of full glide )  Date 6/1/2017 6/8/17 6/20/2017 6/29/2017 9/1/2017 9/14/2017   Median nerve                 Ulnar nerve    Radial nerve  R:  + at 90 degrees arm abd and wrist extended  L:  + at 60 with wrist extended  R:  + at 90 degrees arm abd and wrist extended  L:  + at 60 with wrist extended R:  + at 60 degrees arm abd and wrist straight  L:  + at 60 with wrist extended R:  + at 45 degrees arm abd and wrist straight  L:  + at 45 with wrist extended R:  + at 80 degrees arm abd and wrist straight  L:  + at 80 with wrist extended R:  + at 80 degrees arm abd and wrist straight  L:  + at 60 with wrist extended     Date 9/21/2017 9/28/2017 10/5/2017 11/2/2017 11/9/2017 11/14/2017   Median nerve                   Ulnar nerve    Radial nerve  R:  + at 90 degrees arm abd and wrist extended  L:  + at 70 with wrist extended  R:  + at 90 degrees arm abd and wrist extended  L:  + at 90 with wrist extended  R:  + at 80 degrees arm abd and wrist extended  L:  + at 80 with wrist extended  R:  + at 90 degrees arm abd and wrist neutral  L:  + at 60 with wrist extended R:  + at 90 degrees arm abd and wrist 45 degree extension  L:  + at 90 with wrist extended fully R:  + at 90 degrees arm abd and wrist 45 degree extension  L:  + at  90 with wrist extended fully     Date 11/30/2017 12/7/2017 3/9/2018 4/12/2018 4/27/2018 5/10/2018   Median nerve                     Ulnar nerve    Radial nerve  R:  + at 45 degrees arm abd and wrist neutral  L:  + at 90 with wrist neutral R:  Full nerve glide  L:  Full nerve glide R:  + at 90 degrees arm abd and wrist  Slightly extended  L:  + at 90 with wrist slightly extended R:  + at 90 degrees arm abd and wrist  neutral  L:  + at 70 with wrist neutral R:  + at 90 degrees arm abd and wrist  Fully extended  L:  + at 90 with wrist fully extended R:  + at 90 degrees arm abd and wrist  Fully extended  L:  + at 90 with wrist fully extended     Date 5/31/2018 6/21/2018 7/12/2018 10/4/2018 10/11/2018 10/17/2018   Median nerve                     Ulnar nerve    Radial nerve  R:  + at 90 degrees arm abd and wrist  Fully extended  L:  + at 90 with wrist fully extended R:  + at 70 degrees arm abd and wrist  Fully extended  L:  + at 70 with wrist fully extended R:  + at 90 degrees arm abd and wrist  Fully extended  L:  + at 90 with wrist fully extended R:  + at 80 degrees arm abd and wrist  partially extended  L:  + at 80 with wrist partially extended R:  + at 80 degrees arm abd and wrist  partially extended  L:  + at 90 with wrist fully extended R:  + at 90 degrees arm abd and wrist  partially extended  L:  + at 90 with wrist fully extended       Cervical Screen: (+ or -)  Date 6/1/2017 3/9/2018      Active Active       Flexion - -      Extension - -      Lateral Flexion  Right - -     Lateral Flexion  Left - -     Rotation Right heaviness -     Rotation Left heaviness -     Compression Neck - -       TOS Special Tests  Date 6/1/2017 6/29/2017 9/1/2017 11/9/2017 11/30/2017 3/9/2018    Left Right Left Right Left Right Left Right Left Right Left Right   Inderjit Test + + + + - + small + + + after 35 sec + after 35 sec - + after 10 sec   Costoclavicular Test + + + + + after45 sec + after 15 sec small and ring - + after 45 sec +  after 20 sec + after 20 sec + after 20 sec + after 10 sec   Rae's Test               Adson's Test                 Date 2018   2018   10/4/2018                  Inderjit Test - -   - -       Costoclavicular Test Full feeling Full feeling Full feeling Full feeling Full feeling Full feeling       Rae's Test             Adson's Test                   Posture:  []              Normal   []             Forward Neck Posture  [x]             Rounded Forward Shoulders  []             Shoulder Height Difference  Comments:    Patient's proximal musculature at shoulder appears imbalanced with tight pectoralis muscles, subscapularis, upper trapezius, lattisimus and weak scapular stabilizers.  This pattern contributes to overuse of distal musculature.    Edema:  [x]             None  []              Mild   []             Moderate  []              Severe     []              of affected part      Sensation: []              WNL throughout all nerve distributions; per patient report    [x]              Decreased  [x]              Median    [x]             Ulnar    []             Radial nerve distribution    []              See Sensory Evaluation    Description:    STRENGTH:   (Measured in pounds)     2017   2017   2017 10/19/2017     Trials Right Left Right Left Right Left  Right Left   1  2  3  Av#-       65#-       65#       68#       70#       71#       65#       75#      2017 3/9/2018 2018 2018   Trials Right Left Right Left Right Left  Right Left   1  2  3  Av#       70#       70#       65#       70#       65#       70#       62#      10/4/2018      Trials Right Left Right Left Right Left  Right Left   1  2  3  Av#       65#             3 pt Pinch 2017 2017 3/9/2018 2018   Trials Right Left Right Left Right Left  Right Left   1  2  3  Av#-       17#-       19#       19#       19#       19#       19#       19#     3 pt  Pinch 10/4/2018      Trials Right Left Right Left Right Left  Right Left   1  2  3  Av#-       19#-             Lateral Pinch 2017   2017   3/9/2018   2018     Trials Right Left Right Left Right Left  Right Left   1  2  3  Av#-       21#-       20#       21#       17#       19#       17#       19#     Lateral Pinch 10/4/2018      Trials Right Left Right Left Right Left  Right Left   1  2  3  Av#       19#           Home Exercise Program:  Foam roller massage and stretch pecs  Icing prn  Proximal median nerve glide  Massage fa and upper arm (bicep) (partner to use roller bar to massage)  Clock pec stretch  Latissimus stretch  FM LEP (bilateral)  Extensor stretches  Table top flexor stretch  tricep stretch  Table top flexor stretch  [place] massage  Epsom salts  Can massage tricep  Core strengthening and Lower trap squeezes with nerve gliding  Trigger massage to outer elbow with flexion / extension of elbow  Neck lateral bend with median nerve glide  Use long dowel to push into tight pecs  Back extension exercises over therapy ball  Bullet prosource foam roller for deeper massage  Spiky ball massage to flexor wad and pec area  Back extension over ball  Lower abdominal strengthening-raise legs up and lower slowly  Ulnar and radial FM wrists; SCS bilateral ulnar wrists    Next Visit:  Laser  Mfr  Stretching  Nerve gliding   Posture and back strengthening

## 2018-10-17 NOTE — MR AVS SNAPSHOT
After Visit Summary   10/17/2018    Maegan Lira    MRN: [id number]           Patient Information     Date Of Birth          1970        Visit Information        Provider Department      10/17/2018 12:00 PM Felicia Summers [facility]        Today's Diagnoses     Bilateral carpal tunnel syndrome           Follow-ups after your visit        Your next 10 appointments already scheduled     Oct 19, 2018 12:30 PM CDT   [facility]urse Only with [facility]URSE   [facility]    [address] [facility] [address] [phone]            Nov 07, 2018 12:00 PM CST   [facility]    [address] [id number] [phone]            Nov 21, 2018 12:00 PM CST   [facility]    [address] [id number] [phone]              Who to contact     If you have questions or need follow up information about today's clinic visit or your schedule please contact [facility] directly at [phone].  Normal or non-critical lab and imaging results will be communicated to you by Ararahart, letter or phone within 4 business days after the clinic has received the results. If you do not hear from us within 7 days, please contact the clinic through Ararahart or phone. If you have a critical or abnormal lab result, we will notify you by phone as soon as possible.  Submit refill requests through [employer] or call your pharmacy and they will forward the refill request to us. Please allow 3 business days for your refill to be completed.          Additional Information About Your Visit        Ararahart Information     [employer] gives you secure access to your electronic health record. If you see a primary care provider, you can also send messages to your care team and make appointments. If you have questions,  please call your primary care clinic.  If you do not have a primary care provider, please call [phone] and they will assist you.        Care EveryWhere ID     This is your Care EveryWhere ID. This could be used by other organizations to access your [place] medical records  [id number]         Blood Pressure from Last 3 Encounters:   08/10/18 138/88   07/16/18 134/88   04/30/18 135/89    Weight from Last 3 Encounters:   08/10/18 89.8 kg (198 lb)   07/16/18 89.8 kg (198 lb)   04/30/18 89.3 kg (196 lb 14.4 oz)              We Performed the Following     INFRARED THERAPY     MANUAL THER TECH,1+REGIONS,EA 15 MIN     NEUROMUSCULAR RE-EDUCATION     THERAPEUTIC EXERCISES        Primary Care Provider Office Phone # Fax #    Jelly Mcarthur  [phone] [address] [facility] [address]  [facility] [address]        Equal Access to Services     KENYON WILKS : Hadii aad ku hadasho Soomaali, waaxda luqadaha, qaybta kaalmada adeegyada, waxay jenaein haylouisan sona paredes . So [facility] [phone].    ATENCIÓN: Si santos cruz, tiene a alejo disposición servicios gratuitos de asistencia lingüística. Llame al [phone].    We comply with applicable federal civil rights laws and [place] laws. We do not discriminate on the basis of race, color, national origin, age, disability, sex, sexual orientation, or gender identity.            Thank you!     Thank you for choosing [facility]  for your care. Our goal is always to provide you with excellent care. Hearing back from our patients is one way we can continue to improve our services. Please take a few minutes to complete the written survey that you may receive in the mail after your visit with us. Thank you!             Your Updated Medication List - Protect others around you: Learn how to safely use, store and throw away your medicines at www.disposemymeds.org.          This list is accurate as of 10/17/18 12:10 PM.  Always use your most recent med list.                    Brand Name Dispense Instructions for use Diagnosis    Butalbital-APAP-Caffeine -40 MG Caps     30 capsule    Take one capsule as needed for migraine. Max one capsule daily. Do not take with opiate pain medications including oxycodone.    Migraine without status migrainosus, not intractable, unspecified migraine type       diazepam 5 MG tablet    VALIUM    30 tablet    Take 1 tablet (5 mg) by mouth every 6 hours as needed for anxiety, sleep or muscle spasms If you take this medication, do not take any other muscle relaxants.    Fibromyalgia       DULoxetine 30 MG EC capsule    CYMBALTA    90 capsule    TAKE 1 CAPSULE BY MOUTH EVERY DAY    Chronic pain syndrome       hydrOXYzine 25 MG tablet    ATARAX    60 tablet    TAKE 1 TO 2 TABLETS(25 TO 50 MG) BY MOUTH EVERY 6 HOURS AS NEEDED FOR ANXIETY    Anxiety       naproxen 375 MG tablet    NAPROSYN    60 tablet    Take 1 tablet (375 mg) by mouth 2 times daily as needed for moderate pain    Myofacial muscle pain       omeprazole 20 MG CR capsule    priLOSEC    90 capsule    TAKE 1 CAPSULE BY MOUTH DAILY AS NEEDED    Nausea       ondansetron 4 MG ODT tab    ZOFRAN-ODT    30 tablet    Take 1 tablet (4 mg) by mouth daily as needed for nausea    Nausea       senna-docusate 8.6-50 MG per tablet    KATIANA-COLACE    90 tablet    Take 1-2 tablets by mouth 2 times daily as needed for constipation    Constipation, unspecified constipation type       TRAMADOL HCL PO           UNABLE TO FIND      Take 10 drops by mouth 2 times daily MEDICATION NAME: CBD Max Hemp Oil        ZANAFLEX PO

## 2018-10-19 ENCOUNTER — ALLIED HEALTH/NURSE VISIT (OUTPATIENT)
Dept: NURSING | Facility: CLINIC | Age: 48
End: 2018-10-19
Payer: COMMERCIAL

## 2018-10-19 DIAGNOSIS — Z23 NEED FOR PROPHYLACTIC VACCINATION AND INOCULATION AGAINST INFLUENZA: Primary | ICD-10-CM

## 2018-10-19 PROCEDURE — 90471 IMMUNIZATION ADMIN: CPT

## 2018-10-19 PROCEDURE — 90686 IIV4 VACC NO PRSV 0.5 ML IM: CPT

## 2018-10-19 PROCEDURE — 99207 ZZC NO CHARGE NURSE ONLY: CPT

## 2018-10-19 NOTE — MR AVS SNAPSHOT
After Visit Summary   10/19/2018    Maegan Lira    MRN: [id number]           Patient Information     Date Of Birth          1970        Visit Information        Provider Department      10/19/2018 12:30 PM [facility] [place] NURSE [facility]        Today's Diagnoses     Need for prophylactic vaccination and inoculation against influenza    -  1       Follow-ups after your visit        Your next 10 appointments already scheduled     Nov 07, 2018 12:00 PM CST   [facility]    [address] [id number] [phone]            Nov 21, 2018 12:00 PM CST   CINDY Hand with Felicia Summers   [facility]    [address] [id number] [phone]              Who to contact     If you have questions or need follow up information about today's clinic visit or your schedule please contact [facility] directly at [phone].  Normal or non-critical lab and imaging results will be communicated to you by Moonshadohart, letter or phone within 4 business days after the clinic has received the results. If you do not hear from us within 7 days, please contact the clinic through [employer] or phone. If you have a critical or abnormal lab result, we will notify you by phone as soon as possible.  Submit refill requests through [email or web address] or call your pharmacy and they will forward the refill request to us. Please allow 3 business days for your refill to be completed.          Additional Information About Your Visit        Moonshadohart Information     [email or web address] gives you secure access to your electronic health record. If you see a primary care provider, you can also send messages to your care team and make appointments. If you have questions, please call your primary care clinic.  If you do not have a primary care provider, please call [phone] and they will assist  you.        Care EveryWhere ID     This is your Care EveryWhere ID. This could be used by other organizations to access your [place] medical records  [id number]         Blood Pressure from Last 3 Encounters:   08/10/18 138/88   07/16/18 134/88   04/30/18 135/89    Weight from Last 3 Encounters:   08/10/18 198 lb (89.8 kg)   07/16/18 198 lb (89.8 kg)   04/30/18 196 lb 14.4 oz (89.3 kg)              We Performed the Following     FLU VACCINE, SPLIT VIRUS, IM (QUADRIVALENT) [21487]- >3 YRS     Vaccine Administration, Initial [39768]        Primary Care Provider Office Phone # Fax #    Jelly Mcarthur,  [phone] [address] [address]        Equal Access to Services     MICHAEL WILKS : Hadii aad ku hadasho Soomaali, waaxda luqadaha, qaybta kaalmada adeegyada, felicity ibarra hayrachel paredes . So [place] -[phone].    ATENCIÓN: Si habla español, tiene a alejo disposición servicios gratuitos de asistencia lingüística. Llame al [phone].    We comply with applicable federal civil rights laws and [place] laws. We do not discriminate on the basis of race, color, national origin, age, disability, sex, sexual orientation, or gender identity.            Thank you!     Thank you for choosing [facility]  for your care. Our goal is always to provide you with excellent care. Hearing back from our patients is one way we can continue to improve our services. Please take a few minutes to complete the written survey that you may receive in the mail after your visit with us. Thank you!             Your Updated Medication List - Protect others around you: Learn how to safely use, store and throw away your medicines at www.disposemymeds.org.          This list is accurate as of 10/19/18 12:40 PM.  Always use your most recent med list.                   Brand Name Dispense Instructions for use Diagnosis    Butalbital-APAP-Caffeine -40 MG Caps     30 capsule     Take one capsule as needed for migraine. Max one capsule daily. Do not take with opiate pain medications including oxycodone.    Migraine without status migrainosus, not intractable, unspecified migraine type       diazepam 5 MG tablet    VALIUM    30 tablet    Take 1 tablet (5 mg) by mouth every 6 hours as needed for anxiety, sleep or muscle spasms If you take this medication, do not take any other muscle relaxants.    Fibromyalgia       DULoxetine 30 MG EC capsule    CYMBALTA    90 capsule    TAKE 1 CAPSULE BY MOUTH EVERY DAY    Chronic pain syndrome       hydrOXYzine 25 MG tablet    ATARAX    60 tablet    TAKE 1 TO 2 TABLETS(25 TO 50 MG) BY MOUTH EVERY 6 HOURS AS NEEDED FOR ANXIETY    Anxiety       naproxen 375 MG tablet    NAPROSYN    60 tablet    Take 1 tablet (375 mg) by mouth 2 times daily as needed for moderate pain    Myofacial muscle pain       omeprazole 20 MG CR capsule    priLOSEC    90 capsule    TAKE 1 CAPSULE BY MOUTH DAILY AS NEEDED    Nausea       ondansetron 4 MG ODT tab    ZOFRAN-ODT    30 tablet    Take 1 tablet (4 mg) by mouth daily as needed for nausea    Nausea       senna-docusate 8.6-50 MG per tablet    KATIANA-COLACE    90 tablet    Take 1-2 tablets by mouth 2 times daily as needed for constipation    Constipation, unspecified constipation type       TRAMADOL HCL PO           UNABLE TO FIND      Take 10 drops by mouth 2 times daily MEDICATION NAME: CBD Max Hemp Oil        ZANAFLEX PO

## 2018-10-19 NOTE — PROGRESS NOTES

## 2018-11-07 ENCOUNTER — THERAPY VISIT (OUTPATIENT)
Dept: OCCUPATIONAL THERAPY | Facility: CLINIC | Age: 48
End: 2018-11-07
Payer: COMMERCIAL

## 2018-11-07 DIAGNOSIS — G56.03 BILATERAL CARPAL TUNNEL SYNDROME: ICD-10-CM

## 2018-11-07 PROCEDURE — 97110 THERAPEUTIC EXERCISES: CPT | Mod: GO | Performed by: OCCUPATIONAL THERAPIST

## 2018-11-07 PROCEDURE — 97112 NEUROMUSCULAR REEDUCATION: CPT | Mod: GO | Performed by: OCCUPATIONAL THERAPIST

## 2018-11-07 PROCEDURE — 97026 INFRARED THERAPY: CPT | Mod: GO | Performed by: OCCUPATIONAL THERAPIST

## 2018-11-07 PROCEDURE — 97140 MANUAL THERAPY 1/> REGIONS: CPT | Mod: GO | Performed by: OCCUPATIONAL THERAPIST

## 2018-11-07 NOTE — PROGRESS NOTES
Progress Note - Hand Therapy    Current Date:  11/7/2018    Diagnosis:  Bilateral hand pain/weakness  DOI:  October 2015    Number of visits to date:  26    Reporting period is 10/4/2018 to 11/7/2018.    Subjectve:   Subjective changes as noted by patient:  I am having to wear my splints every night.  More numbness and tingling.    Functional changes noted by patient:  Decrease in Self Care Tasks (dressing, eating, bathing, hygiene/toileting), Work Tasks, Sleep Patterns, Recreational Activities, Household Chores and Driving   Patient has noted adverse reaction to:  None        Objective:  Pain Level Report  VAS(0-10) 6/1/2017 6/8/2017 6/15/2017 6/20/2017 6/29/2017 9/1/2017 9/14/2017 9/21/2017   At Rest: 7 tingling  R: 6  L: 6 7 tingling  R: 6  L: 6 7 tingling  R: 6  L: 6 7 tingling  R: 6  L: 6 6-7 tingling  R: 5  L: 5 5 tingling  R: 3  L: 3 4 tingling  R: 3  L: 3 3 tingling  R: 3  L: 3   With Use: R: 8  L: 8 R: 8  L: 8 R: 8  L: 8 R: 8  L: 8 R: 8  L: 8 R: 6-7  L: 6-7 R: 6  L: 6 R: 5  L: 5     VAS(0-10) 9/28/2017 10/5/2017 10/19/17 11/2/2017 11/9/2017 11/14/17 11/30/17 12/7/2017   At Rest: 3 tingling  R: 3  L: 3 3 tingling  R: 3  L: 3 5 tingling  R: 5  L: 5 7 tingling  R: 7  L: 7 6 tingling  R: 5  L: 5 6 tingling  R: 5  L: 5 6 tingling  R: 5  L: 5 6 tingling  R: 5  L: 5   With Use: R: 5  L: 5 R: 5-6  R elbow 6-8  L: 5-6 R: 7-8  R elbow 3  L: 6 R: 9  R elbow 3  L: 9 R: 8  R elbow 1  L: 8 R: 8  R elbow 1  L: 8 R: 8  R elbow 1  L: 8 R: 8  B elbows 2  L: 8     VAS(0-10) 3/9/2018 4/12/2018 4/27/2018 5/10/2018 5/31/2018 6/21/2018 7/12/2018 10/4/2018   At Rest: 3-4 tingling  R: 3  L: 3 4 tingling  R: 5  L: 3 0-1 tingling  R: 1  L: 1 0  tingling  R: 0  L: 0 R: 0  L: 0 R: 0  L: 0 R: 0  L: 0 R: 0  L: 0   With Use: R: 5  B elbows 0  L: 5 R: 5  B elbows 0  L: 5 R: 0-1  B elbows 0  L: 1-2 R: 0-1  B elbows 0  L: 1-2 R: wrist pain 5/10 bilateral R: wrist pain 5/10 bilateral R: wrist pain 3/10 bilateral;  Elbow pain (medial) 5/10 R:  wrist pain 3/10 bilateral;  Elbow pain (medial) 2/10     VAS(0-10) 10/11/18 10/17/18 11/7/2018        At Rest: R: 0  L: 0 R: 0  L: 0 R: 0  L: 0        With Use: R: wrist pain 0/10;  L: arm pain 5/10 R: wrist pain 0/10;  L: arm pain 3/10 R: wrist pain 2/10;  L: arm pain 2/10            Report of Pain:  Location:  All fingers  Description:  pain  Frequency:  constant    Duration:  lingers  Exacerbated by:  use  Relieved by:  meds (somewhat)  Progression:improving    Special Tests:   Date 6/1/2017 6/29/2017 9/1/2017 11/30/2017 3/9/2018    Left Right Left Right Left Right Left Right Left Right   Tinels CT             Tinels PIN             Tinels DSRN             Tinels cubital tunnel             Tinels [place]             Phalen's   + + - + - - - -     Elbow Flexion Test   + + - - - - - + + R,S + R,S     Date 5/31/2018 7/12/2018 10/4/2018 11/7/2018     Left Right Left Right Left Right Left Right Left Right   Tinels CT             Tinels PIN             Tinels DSRN             Tinels cubital tunnel             Tinels [place]             Phalen's         - -     Elbow Flexion Test   + R, S + R, S + R, S + R, S - - - -         ULTT ( % of full glide )  Date 6/1/2017 6/8/17 6/20/2017 6/29/2017 9/1/2017 9/14/2017   Median nerve                 Ulnar nerve    Radial nerve  R:  + at 90 degrees arm abd and wrist extended  L:  + at 60 with wrist extended  R:  + at 90 degrees arm abd and wrist extended  L:  + at 60 with wrist extended R:  + at 60 degrees arm abd and wrist straight  L:  + at 60 with wrist extended R:  + at 45 degrees arm abd and wrist straight  L:  + at 45 with wrist extended R:  + at 80 degrees arm abd and wrist straight  L:  + at 80 with wrist extended R:  + at 80 degrees arm abd and wrist straight  L:  + at 60 with wrist extended     Date 9/21/2017 9/28/2017 10/5/2017 11/2/2017 11/9/2017 11/14/2017   Median nerve                   Ulnar nerve    Radial nerve  R:  + at 90 degrees arm abd and wrist  extended  L:  + at 70 with wrist extended  R:  + at 90 degrees arm abd and wrist extended  L:  + at 90 with wrist extended  R:  + at 80 degrees arm abd and wrist extended  L:  + at 80 with wrist extended  R:  + at 90 degrees arm abd and wrist neutral  L:  + at 60 with wrist extended R:  + at 90 degrees arm abd and wrist 45 degree extension  L:  + at 90 with wrist extended fully R:  + at 90 degrees arm abd and wrist 45 degree extension  L:  + at 90 with wrist extended fully     Date 11/30/2017 12/7/2017 3/9/2018 4/12/2018 4/27/2018 5/10/2018   Median nerve                     Ulnar nerve    Radial nerve  R:  + at 45 degrees arm abd and wrist neutral  L:  + at 90 with wrist neutral R:  Full nerve glide  L:  Full nerve glide R:  + at 90 degrees arm abd and wrist  Slightly extended  L:  + at 90 with wrist slightly extended R:  + at 90 degrees arm abd and wrist  neutral  L:  + at 70 with wrist neutral R:  + at 90 degrees arm abd and wrist  Fully extended  L:  + at 90 with wrist fully extended R:  + at 90 degrees arm abd and wrist  Fully extended  L:  + at 90 with wrist fully extended     Date 5/31/2018 6/21/2018 7/12/2018 10/4/2018 10/11/2018 10/17/2018   Median nerve                     Ulnar nerve    Radial nerve  R:  + at 90 degrees arm abd and wrist  Fully extended  L:  + at 90 with wrist fully extended R:  + at 70 degrees arm abd and wrist  Fully extended  L:  + at 70 with wrist fully extended R:  + at 90 degrees arm abd and wrist  Fully extended  L:  + at 90 with wrist fully extended R:  + at 80 degrees arm abd and wrist  partially extended  L:  + at 80 with wrist partially extended R:  + at 80 degrees arm abd and wrist  partially extended  L:  + at 90 with wrist fully extended R:  + at 90 degrees arm abd and wrist  partially extended  L:  + at 90 with wrist fully extended     Date 11/7/2018        Median nerve                     Ulnar nerve    Radial nerve  R:  + at 80 degrees arm abd and partially wrist    extended  L:  + at 80 with wrist partially extended              Cervical Screen: (+ or -)  Date 6/1/2017 3/9/2018      Active Active       Flexion - -      Extension - -      Lateral Flexion  Right - -     Lateral Flexion  Left - -     Rotation Right heaviness -     Rotation Left heaviness -     Compression Neck - -       TOS Special Tests  Date 6/1/2017 6/29/2017 9/1/2017 11/9/2017 11/30/2017 3/9/2018    Left Right Left Right Left Right Left Right Left Right Left Right   Inderjit Test + + + + - + small + + + after 35 sec + after 35 sec - + after 10 sec   Costoclavicular Test + + + + + after45 sec + after 15 sec small and ring - + after 45 sec + after 20 sec + after 20 sec + after 20 sec + after 10 sec   Rae's Test               Adson's Test                 Date 5/31/2018   7/12/2018   10/4/2018 11/7/2018             R L     Inderjit Test - -   - - + R,S -     Costoclavicular Test Full feeling Full feeling Full feeling Full feeling Full feeling Full feeling Full feeling Full feeling     Rae's Test             Adson's Test                   Posture:  []              Normal   []             Forward Neck Posture  [x]             Rounded Forward Shoulders  []             Shoulder Height Difference  Comments:    Patient's proximal musculature at shoulder appears imbalanced with tight pectoralis muscles, subscapularis, upper trapezius, lattisimus and weak scapular stabilizers.  This pattern contributes to overuse of distal musculature.    Edema:  [x]             None  []              Mild   []             Moderate  []              Severe     []              of affected part      Sensation: []              WNL throughout all nerve distributions; per patient report    [x]              Decreased  [x]              Median    [x]             Ulnar    []             Radial nerve distribution    []              See Sensory Evaluation    Description:    STRENGTH:   (Measured in pounds)     6/1/2017 6/29/2017 9/1/2017  10/19/2017     Trials Right Left Right Left Right Left  Right Left   1  2  3  Av#-       65#-       65#       68#       70#       71#       65#       75#      2017 3/9/2018 2018 2018   Trials Right Left Right Left Right Left  Right Left   1  2  3  Av#       70#       70#       65#       70#       65#       70#       62#      10/4/2018 2018     Trials Right Left Right Left Right Left  Right Left   1  2  3  Av#       65#       74#       72#           3 pt Pinch 2017 2017 3/9/2018 2018   Trials Right Left Right Left Right Left  Right Left   1  2  3  Av#-       17#-       19#       19#       19#       19#       19#       19#     3 pt Pinch 10/4/2018 2018     Trials Right Left Right Left Right Left  Right Left   1  2  3  Av#-       19#-       20#-       20#-           Lateral Pinch 2017   2017   3/9/2018   2018     Trials Right Left Right Left Right Left  Right Left   1  2  3  Av#-       21#-       20#       21#       17#       19#       17#       19#     Lateral Pinch 10/4/2018 2018     Trials Right Left Right Left Right Left  Right Left   1  2  3  Av#       19#       20#-       22#-         Assessment:  Response to therapy has been improvement to:  Strength:   and pinch  Exacerbation:  Pain and muscle tightness in pec area.    Overall Assessment:  Patient has experienced an exacerbation of symptoms.  Increase numbness and tingling.    STG/LTG:  STGoals have been reviewed and no progress has been made;  see goal sheet for details and changes.    Plan:  Frequency/Duration:  Recommend continuing to see patient  2 X a month, once daily  for 2-4months  Appropriateness of Rx I have re-evaluated this patient and find that the nature, scope, duration and intensity of the therapy is appropriate for the medical condition of the patient.    Recommendations for Continued  Therapy      Home Exercise Program:  Foam roller massage and stretch pecs  Icing prn  Proximal median nerve glide  Massage fa and upper arm (bicep) (partner to use roller bar to massage)  Clock pec stretch  Latissimus stretch  FM LEP (bilateral)  Extensor stretches  Table top flexor stretch  tricep stretch  Table top flexor stretch  Reading massage  Epsom salts  Can massage tricep  Core strengthening and Lower trap squeezes with nerve gliding  Trigger massage to outer elbow with flexion / extension of elbow  Neck lateral bend with median nerve glide  Use long dowel to push into tight pecs  Back extension exercises over therapy ball  Bullet prosource foam roller for deeper massage  Spiky ball massage to flexor wad and pec area  Back extension over ball  Lower abdominal strengthening-raise legs up and lower slowly  Ulnar and radial FM wrists; SCS bilateral ulnar wrists    Next Visit:  Laser  Mfr  Stretching  Nerve gliding   Posture and back strengthening

## 2018-11-07 NOTE — MR AVS SNAPSHOT
After Visit Summary   11/7/2018    Maegan Lira    MRN: [id number]           Patient Information     Date Of Birth          1970        Visit Information        Provider Department      11/7/2018 12:00 PM [facility]        Today's Diagnoses     Bilateral carpal tunnel syndrome           Follow-ups after your visit        Your next 10 appointments already scheduled     Nov 14, 2018 11:30 AM CST   MA SCREENING BILATERAL W/ AUBREE with [id number]   [facility] ([facility])    [address] [place] [address], [address]  [facility] [address] [phone]           How do I prepare for my exam? (Food and drink instructions) No Food and Drink Restrictions.  How do I prepare for my exam? (Other instructions) Do not use any powder, lotion or deodorant under your arms or on your breast. If you do, we will ask you to remove it before your exam.  What should I wear: Wear comfortable, two-piece clothing.  How long does the exam take: Most scans will take 15 minutes.  What should I bring: Bring any previous mammograms from other facilities or have them mailed to the breast center.  Do I need a :  No  is needed.  What do I need to tell my doctor: If you have any allergies, tell your care team.  What should I do after the exam: No restrictions, You may resume normal activities.  What is this test: This test is an x-ray of the breast to look for breast disease. The breast is pressed between two plates to flatten and spread the tissue. An X-ray is taken of the breast from different angles.  Who should I call with questions: If you have any questions, please call the Imaging Department where you will have your exam. Directions, parking instructions, and other information is available on our website, [email or web address].  Other information about my exam Three-dimensional (3D) mammograms are available at [facility] locations in [facility],  "[place], [place], [facility], [place], and [place].  Health locations include [place] and the [facility] in [place].  Benefits of 3D mammograms include: * Improved rate of cancer detection * Decreases your chance of having to go back for more tests, which means fewer: * \"False-positive\" results (This means that there is an abnormal area but it isn't cancer.) * Invasive testing procedures, such as a biopsy or surgery * Can provide clearer images of the breast if you have dense breast tissue.  *3D mammography is an optional exam that anyone can have with a 2D mammogram. It doesn't replace or take the place of a 2D mammogram. 2D mammograms remain an effective screening test for all women.  Not all insurance companies cover the cost of a 3D mammogram. Check with your insurance.            Nov 21, 2018 12:00 PM CST   CINDY Hand with Felicia Summers   [facility] ([facility])    [address] [place] [id number] [phone]              Who to contact     If you have questions or need follow up information about today's clinic visit or your schedule please contact [facility] directly at [phone].  Normal or non-critical lab and imaging results will be communicated to you by [employer], letter or phone within 4 business days after the clinic has received the results. If you do not hear from us within 7 days, please contact the clinic through [employer] or phone. If you have a critical or abnormal lab result, we will notify you by phone as soon as possible.  Submit refill requests through [employer] or call your pharmacy and they will forward the refill request to us. Please allow 3 business days for your refill to be completed.          Additional Information About Your Visit        [employer] Information     [employer] gives you secure access to your electronic health record. If you see a primary care provider, you can also send messages to your care team " and make appointments. If you have questions, please call your primary care clinic.  If you do not have a primary care provider, please call [phone] and they will assist you.        Care EveryWhere ID     This is your Care EveryWhere ID. This could be used by other organizations to access your [facility] medical records  [id number]         Blood Pressure from Last 3 Encounters:   08/10/18 138/88   07/16/18 134/88   04/30/18 135/89    Weight from Last 3 Encounters:   08/10/18 89.8 kg (198 lb)   07/16/18 89.8 kg (198 lb)   04/30/18 89.3 kg (196 lb 14.4 oz)              We Performed the Following     INFRARED THERAPY     MANUAL THER TECH,1+REGIONS,EA 15 MIN     NEUROMUSCULAR RE-EDUCATION     THERAPEUTIC EXERCISES        Primary Care Provider Office Phone # Fax #    Jelly Mcarthur [phone] [address] [facility] [address]  [facility] [address]        Equal Access to Services     [facility]ON [facility] : Hadii aad ku hadasho Soomaali, waaxda luqadaha, qaybta kaalmada adeegyada, waxay idiin hayaan anamikaeg allanaravik lalisa . So [facility] [phone].    ATENCIÓN: Si habla español, tiene a alejo disposición servicios gratuitos de asistencia lingüística. Llame al [phone].    We comply with applicable federal civil rights laws and [place] laws. We do not discriminate on the basis of race, color, national origin, age, disability, sex, sexual orientation, or gender identity.            Thank you!     Thank you for choosing [facility]  for your care. Our goal is always to provide you with excellent care. Hearing back from our patients is one way we can continue to improve our services. Please take a few minutes to complete the written survey that you may receive in the mail after your visit with us. Thank you!             Your Updated Medication List - Protect others around you: Learn how to safely use, store and throw away your medicines at www.disposemymeds.org.          This list is accurate as of 11/7/18  1:55  PM.  Always use your most recent med list.                   Brand Name Dispense Instructions for use Diagnosis    Butalbital-APAP-Caffeine -40 MG Caps     30 capsule    Take one capsule as needed for migraine. Max one capsule daily. Do not take with opiate pain medications including oxycodone.    Migraine without status migrainosus, not intractable, unspecified migraine type       diazepam 5 MG tablet    VALIUM    30 tablet    Take 1 tablet (5 mg) by mouth every 6 hours as needed for anxiety, sleep or muscle spasms If you take this medication, do not take any other muscle relaxants.    Fibromyalgia       DULoxetine 30 MG EC capsule    CYMBALTA    90 capsule    TAKE 1 CAPSULE BY MOUTH EVERY DAY    Chronic pain syndrome       hydrOXYzine 25 MG tablet    ATARAX    60 tablet    TAKE 1 TO 2 TABLETS(25 TO 50 MG) BY MOUTH EVERY 6 HOURS AS NEEDED FOR ANXIETY    Anxiety       naproxen 375 MG tablet    NAPROSYN    60 tablet    Take 1 tablet (375 mg) by mouth 2 times daily as needed for moderate pain    Myofacial muscle pain       omeprazole 20 MG CR capsule    priLOSEC    90 capsule    TAKE 1 CAPSULE BY MOUTH DAILY AS NEEDED    Nausea       ondansetron 4 MG ODT tab    ZOFRAN-ODT    30 tablet    Take 1 tablet (4 mg) by mouth daily as needed for nausea    Nausea       senna-docusate 8.6-50 MG per tablet    KATIANA-COLACE    90 tablet    Take 1-2 tablets by mouth 2 times daily as needed for constipation    Constipation, unspecified constipation type       TRAMADOL HCL PO           UNABLE TO FIND      Take 10 drops by mouth 2 times daily MEDICATION NAME: CBD Max Hemp Oil        ZANAFLEX PO

## 2018-11-14 ENCOUNTER — HOSPITAL ENCOUNTER (OUTPATIENT)
Dept: MAMMOGRAPHY | Facility: CLINIC | Age: 48
Discharge: HOME | End: 2018-11-14
Attending: INTERNAL MEDICINE | Admitting: INTERNAL MEDICINE
Payer: COMMERCIAL

## 2018-11-14 DIAGNOSIS — Z12.31 VISIT FOR SCREENING MAMMOGRAM: ICD-10-CM

## 2018-11-14 PROCEDURE — 77067 SCR MAMMO BI INCL CAD: CPT

## 2018-11-16 ENCOUNTER — E-VISIT (OUTPATIENT)
Dept: FAMILY MEDICINE | Facility: CLINIC | Age: 48
End: 2018-11-16
Payer: COMMERCIAL

## 2018-11-16 DIAGNOSIS — F41.1 GAD (GENERALIZED ANXIETY DISORDER): Primary | ICD-10-CM

## 2018-11-16 PROCEDURE — 99444 ZZC PHYSICIAN ONLINE EVALUATION & MANAGEMENT SERVICE: CPT | Performed by: INTERNAL MEDICINE

## 2018-11-16 NOTE — LETTER
November 20, 2018        Maegan Lira  [address]  [place] [address]          To whom it may concern at [employer]:     Maegan Lira has been under my care for chronic pain since 10/24/2016.  She has been diagnosed with Generalized Anxiety Disorder, by Dr. Fahad Silvestre, a psychologist with whom she previously worked with within [employer].  This condition impacts her daily activities and interferes with her ability to manage stressful and challenging situations.     Ms Sanchezs condition meets the definition of disability under the ADA.  It is my professional opinion that the presence of her emotional support animals will help alleviate Ms. Lira's anxiety disorder.  Her dogs, Juan and Tank serve this purpose and it is appropriate for all reasonable accommodations to be made for Ms. Lira to travel with her Emotional Support Animals.      Sincerely,        Jelly Mcarthur, DO

## 2018-11-16 NOTE — MR AVS SNAPSHOT
After Visit Summary   11/16/2018    Maegan Lira    MRN: [id number]           Patient Information     Date Of Birth          1970        Visit Information        Provider Department      11/16/2018 12:45 PM Jelly Mcarthur, DO [facility]        Today's Diagnoses     VIKTOR (generalized anxiety disorder)    -  1       Follow-ups after your visit        Your next 10 appointments already scheduled     Nov 21, 2018 12:00 PM CST   CINDY Hand with Felicia Summers   [facility] ([facility]    [address] [place] [id number] [phone]              Who to contact     If you have questions or need follow up information about today's clinic visit or your schedule please contact [facility] directly at [phone].  Normal or non-critical lab and imaging results will be communicated to you by MyChart, letter or phone within 4 business days after the clinic has received the results. If you do not hear from us within 7 days, please contact the clinic through WSO2hart or phone. If you have a critical or abnormal lab result, we will notify you by phone as soon as possible.  Submit refill requests through [employer] or call your pharmacy and they will forward the refill request to us. Please allow 3 business days for your refill to be completed.          Additional Information About Your Visit        MyChart Information     [employer] gives you secure access to your electronic health record. If you see a primary care provider, you can also send messages to your care team and make appointments. If you have questions, please call your primary care clinic.  If you do not have a primary care provider, please call [phone] and they will assist you.        Care EveryWhere ID     This is your Care EveryWhere ID. This could be used by other organizations to access your [facility] medical records  [id number]         Blood Pressure from Last 3 Encounters:    08/10/18 138/88   07/16/18 134/88   04/30/18 135/89    Weight from Last 3 Encounters:   08/10/18 198 lb (89.8 kg)   07/16/18 198 lb (89.8 kg)   04/30/18 196 lb 14.4 oz (89.3 kg)              Today, you had the following     No orders found for display       Primary Care Provider Office Phone # Fax #    Jelly Mcarthur [phone] [address] [facility] [address]  [facility] [address]        Equal Access to Services     [facility]: Hadii aad ku hadasho Soomaali, waaxda luqadaha, qaybta kaalmada adeegyada, felicity paredes . So [facility] [place] [phone].    ATENCIÓN: Si habla español, tiene a alejo disposición servicios gratuitos de asistencia lingüística. [facility] [phone].    We comply with applicable federal civil rights laws and [place] laws. We do not discriminate on the basis of race, color, national origin, age, disability, sex, sexual orientation, or gender identity.            Thank you!     Thank you for choosing [facility]  for your care. Our goal is always to provide you with excellent care. Hearing back from our patients is one way we can continue to improve our services. Please take a few minutes to complete the written survey that you may receive in the mail after your visit with us. Thank you!             Your Updated Medication List - Protect others around you: Learn how to safely use, store and throw away your medicines at www.disposemymeds.org.          This list is accurate as of 11/16/18 11:59 PM.  Always use your most recent med list.                   Brand Name Dispense Instructions for use Diagnosis    Butalbital-APAP-Caffeine -40 MG Caps     30 capsule    Take one capsule as needed for migraine. Max one capsule daily. Do not take with opiate pain medications including oxycodone.    Migraine without status migrainosus, not intractable, unspecified migraine type       diazepam 5 MG tablet    VALIUM    30 tablet    Take 1 tablet (5 mg) by  mouth every 6 hours as needed for anxiety, sleep or muscle spasms If you take this medication, do not take any other muscle relaxants.    Fibromyalgia       DULoxetine 30 MG EC capsule    CYMBALTA    90 capsule    TAKE 1 CAPSULE BY MOUTH EVERY DAY    Chronic pain syndrome       hydrOXYzine 25 MG tablet    ATARAX    60 tablet    TAKE 1 TO 2 TABLETS(25 TO 50 MG) BY MOUTH EVERY 6 HOURS AS NEEDED FOR ANXIETY    Anxiety       naproxen 375 MG tablet    NAPROSYN    60 tablet    Take 1 tablet (375 mg) by mouth 2 times daily as needed for moderate pain    Myofacial muscle pain       omeprazole 20 MG CR capsule    priLOSEC    90 capsule    TAKE 1 CAPSULE BY MOUTH DAILY AS NEEDED    Nausea       ondansetron 4 MG ODT tab    ZOFRAN-ODT    30 tablet    Take 1 tablet (4 mg) by mouth daily as needed for nausea    Nausea       senna-docusate 8.6-50 MG per tablet    KATIANA-COLACE    90 tablet    Take 1-2 tablets by mouth 2 times daily as needed for constipation    Constipation, unspecified constipation type       TRAMADOL HCL PO           UNABLE TO FIND      Take 10 drops by mouth 2 times daily MEDICATION NAME: CBD Max Hemp Oil        ZANAFLEX PO

## 2018-11-20 PROBLEM — F41.1 GAD (GENERALIZED ANXIETY DISORDER): Status: ACTIVE | Noted: 2018-11-20

## 2018-11-21 ENCOUNTER — THERAPY VISIT (OUTPATIENT)
Dept: OCCUPATIONAL THERAPY | Facility: CLINIC | Age: 48
End: 2018-11-21
Payer: COMMERCIAL

## 2018-11-21 DIAGNOSIS — G56.03 BILATERAL CARPAL TUNNEL SYNDROME: ICD-10-CM

## 2018-11-21 PROCEDURE — 97026 INFRARED THERAPY: CPT | Mod: GO | Performed by: OCCUPATIONAL THERAPIST

## 2018-11-21 PROCEDURE — 97112 NEUROMUSCULAR REEDUCATION: CPT | Mod: GO | Performed by: OCCUPATIONAL THERAPIST

## 2018-11-21 PROCEDURE — 97140 MANUAL THERAPY 1/> REGIONS: CPT | Mod: GO | Performed by: OCCUPATIONAL THERAPIST

## 2018-11-21 PROCEDURE — 97110 THERAPEUTIC EXERCISES: CPT | Mod: GO | Performed by: OCCUPATIONAL THERAPIST

## 2018-11-21 NOTE — MR AVS SNAPSHOT
After Visit Summary   11/21/2018    Maegan Lira    MRN: [id number]           Patient Information     Date Of Birth          1970        Visit Information        Provider Department      11/21/2018 12:00 PM Felicia Summers [facility]        Today's Diagnoses     Bilateral carpal tunnel syndrome           Follow-ups after your visit        Who to contact     If you have questions or need follow up information about today's clinic visit or your schedule please contact [facility] directly at [phone].  Normal or non-critical lab and imaging results will be communicated to you by Joontohart, letter or phone within 4 business days after the clinic has received the results. If you do not hear from us within 7 days, please contact the clinic through Joontohart or phone. If you have a critical or abnormal lab result, we will notify you by phone as soon as possible.  Submit refill requests through [facility] or call your pharmacy and they will forward the refill request to us. Please allow 3 business days for your refill to be completed.          Additional Information About Your Visit        MyChart Information     [facility] gives you secure access to your electronic health record. If you see a primary care provider, you can also send messages to your care team and make appointments. If you have questions, please call your primary care clinic.  If you do not have a primary care provider, please call [phone] and they will assist you.        Care EveryWhere ID     This is your Care EveryWhere ID. This could be used by other organizations to access your [facility] medical records  [id number]         Blood Pressure from Last 3 Encounters:   08/10/18 138/88   07/16/18 134/88   04/30/18 135/89    Weight from Last 3 Encounters:   08/10/18 89.8 kg (198 lb)   07/16/18 89.8 kg (198 lb)   04/30/18 89.3 kg (196 lb 14.4 oz)              We Performed the Following     INFRARED THERAPY     MANUAL  THER TECH,1+REGIONS,EA 15 MIN     NEUROMUSCULAR RE-EDUCATION     THERAPEUTIC EXERCISES        Primary Care Provider Office Phone # Fax #    Jelly Mcarthur,  [phone] [address] [facility] [address]  [facility] [address]        Equal Access to Services     [facility] : Hadii aad ku hadasho Soomaali, waaxda luqadaha, qaybta kaalmada adeegyada, waxay idiin hayaan adeeg melanie lalisa . So [facility] [phone].    ATENCIÓN: Si habla español, tiene a alejo disposición servicios gratuitos de asistencia lingüística. Llame al [phone].    We comply with applicable federal civil rights laws and [place] laws. We do not discriminate on the basis of race, color, national origin, age, disability, sex, sexual orientation, or gender identity.            Thank you!     Thank you for choosing [facility]  for your care. Our goal is always to provide you with excellent care. Hearing back from our patients is one way we can continue to improve our services. Please take a few minutes to complete the written survey that you may receive in the mail after your visit with us. Thank you!             Your Updated Medication List - Protect others around you: Learn how to safely use, store and throw away your medicines at www.disposemymeds.org.          This list is accurate as of 11/21/18  1:02 PM.  Always use your most recent med list.                   Brand Name Dispense Instructions for use Diagnosis    Butalbital-APAP-Caffeine -40 MG Caps     30 capsule    Take one capsule as needed for migraine. Max one capsule daily. Do not take with opiate pain medications including oxycodone.    Migraine without status migrainosus, not intractable, unspecified migraine type       diazepam 5 MG tablet    VALIUM    30 tablet    Take 1 tablet (5 mg) by mouth every 6 hours as needed for anxiety, sleep or muscle spasms If you take this medication, do not take any other muscle relaxants.    Fibromyalgia       DULoxetine 30 MG  EC capsule    CYMBALTA    90 capsule    TAKE 1 CAPSULE BY MOUTH EVERY DAY    Chronic pain syndrome       hydrOXYzine 25 MG tablet    ATARAX    60 tablet    TAKE 1 TO 2 TABLETS(25 TO 50 MG) BY MOUTH EVERY 6 HOURS AS NEEDED FOR ANXIETY    Anxiety       naproxen 375 MG tablet    NAPROSYN    60 tablet    Take 1 tablet (375 mg) by mouth 2 times daily as needed for moderate pain    Myofacial muscle pain       omeprazole 20 MG CR capsule    priLOSEC    90 capsule    TAKE 1 CAPSULE BY MOUTH DAILY AS NEEDED    Nausea       ondansetron 4 MG ODT tab    ZOFRAN-ODT    30 tablet    Take 1 tablet (4 mg) by mouth daily as needed for nausea    Nausea       senna-docusate 8.6-50 MG per tablet    KATIANA-COLACE    90 tablet    Take 1-2 tablets by mouth 2 times daily as needed for constipation    Constipation, unspecified constipation type       TRAMADOL HCL PO           UNABLE TO FIND      Take 10 drops by mouth 2 times daily MEDICATION NAME: CBD Max Hemp Oil        ZANAFLEX PO

## 2018-11-21 NOTE — PROGRESS NOTES
SOAP note objective information for 11/21/2018.    Please refer to the daily flowsheet for treatment today, total treatment time and time spent performing 1:1 timed codes.       Objective:  Pain Level Report  VAS(0-10) 6/1/2017 6/8/2017 6/15/2017 6/20/2017 6/29/2017 9/1/2017 9/14/2017 9/21/2017   At Rest: 7 tingling  R: 6  L: 6 7 tingling  R: 6  L: 6 7 tingling  R: 6  L: 6 7 tingling  R: 6  L: 6 6-7 tingling  R: 5  L: 5 5 tingling  R: 3  L: 3 4 tingling  R: 3  L: 3 3 tingling  R: 3  L: 3   With Use: R: 8  L: 8 R: 8  L: 8 R: 8  L: 8 R: 8  L: 8 R: 8  L: 8 R: 6-7  L: 6-7 R: 6  L: 6 R: 5  L: 5     VAS(0-10) 9/28/2017 10/5/2017 10/19/17 11/2/2017 11/9/2017 11/14/17 11/30/17 12/7/2017   At Rest: 3 tingling  R: 3  L: 3 3 tingling  R: 3  L: 3 5 tingling  R: 5  L: 5 7 tingling  R: 7  L: 7 6 tingling  R: 5  L: 5 6 tingling  R: 5  L: 5 6 tingling  R: 5  L: 5 6 tingling  R: 5  L: 5   With Use: R: 5  L: 5 R: 5-6  R elbow 6-8  L: 5-6 R: 7-8  R elbow 3  L: 6 R: 9  R elbow 3  L: 9 R: 8  R elbow 1  L: 8 R: 8  R elbow 1  L: 8 R: 8  R elbow 1  L: 8 R: 8  B elbows 2  L: 8     VAS(0-10) 3/9/2018 4/12/2018 4/27/2018 5/10/2018 5/31/2018 6/21/2018 7/12/2018 10/4/2018   At Rest: 3-4 tingling  R: 3  L: 3 4 tingling  R: 5  L: 3 0-1 tingling  R: 1  L: 1 0  tingling  R: 0  L: 0 R: 0  L: 0 R: 0  L: 0 R: 0  L: 0 R: 0  L: 0   With Use: R: 5  B elbows 0  L: 5 R: 5  B elbows 0  L: 5 R: 0-1  B elbows 0  L: 1-2 R: 0-1  B elbows 0  L: 1-2 R: wrist pain 5/10 bilateral R: wrist pain 5/10 bilateral R: wrist pain 3/10 bilateral;  Elbow pain (medial) 5/10 R: wrist pain 3/10 bilateral;  Elbow pain (medial) 2/10     VAS(0-10) 10/11/18 10/17/18 11/7/2018 11/21/18       At Rest: R: 0  L: 0 R: 0  L: 0 R: 0  L: 0 R: 0  L: 0       With Use: R: wrist pain 0/10;  L: arm pain 5/10 R: wrist pain 0/10;  L: arm pain 3/10 R: wrist pain 2/10;  L: arm pain 2/10 R: wrist pain 2/10;  L: arm pain 2/10           Report of Pain:  Location:  All fingers  Description:   pain  Frequency:  constant    Duration:  lingers  Exacerbated by:  use  Relieved by:  meds (somewhat)  Progression:still exacerbated    Special Tests:   Date 6/1/2017 6/29/2017 9/1/2017 11/30/2017 3/9/2018    Left Right Left Right Left Right Left Right Left Right   Tinels CT             Tinels PIN             Tinels DSRN             Tinels cubital tunnel             Tinels [place]             Phalen's   + + - + - - - -     Elbow Flexion Test   + + - - - - - + + R,S + R,S     Date 5/31/2018 7/12/2018 10/4/2018 11/7/2018     Left Right Left Right Left Right Left Right Left Right   Tinels CT             Tinels PIN             Tinels DSRN             Tinels cubital tunnel             Tinels [place]             Phalen's         - -     Elbow Flexion Test   + R, S + R, S + R, S + R, S - - - -         ULTT ( % of full glide )  Date 6/1/2017 6/8/17 6/20/2017 6/29/2017 9/1/2017 9/14/2017   Median nerve                 Ulnar nerve    Radial nerve  R:  + at 90 degrees arm abd and wrist extended  L:  + at 60 with wrist extended  R:  + at 90 degrees arm abd and wrist extended  L:  + at 60 with wrist extended R:  + at 60 degrees arm abd and wrist straight  L:  + at 60 with wrist extended R:  + at 45 degrees arm abd and wrist straight  L:  + at 45 with wrist extended R:  + at 80 degrees arm abd and wrist straight  L:  + at 80 with wrist extended R:  + at 80 degrees arm abd and wrist straight  L:  + at 60 with wrist extended     Date 9/21/2017 9/28/2017 10/5/2017 11/2/2017 11/9/2017 11/14/2017   Median nerve                   Ulnar nerve    Radial nerve  R:  + at 90 degrees arm abd and wrist extended  L:  + at 70 with wrist extended  R:  + at 90 degrees arm abd and wrist extended  L:  + at 90 with wrist extended  R:  + at 80 degrees arm abd and wrist extended  L:  + at 80 with wrist extended  R:  + at 90 degrees arm abd and wrist neutral  L:  + at 60 with wrist extended R:  + at 90 degrees arm abd and wrist 45 degree  extension  L:  + at 90 with wrist extended fully R:  + at 90 degrees arm abd and wrist 45 degree extension  L:  + at 90 with wrist extended fully     Date 11/30/2017 12/7/2017 3/9/2018 4/12/2018 4/27/2018 5/10/2018   Median nerve                     Ulnar nerve    Radial nerve  R:  + at 45 degrees arm abd and wrist neutral  L:  + at 90 with wrist neutral R:  Full nerve glide  L:  Full nerve glide R:  + at 90 degrees arm abd and wrist  Slightly extended  L:  + at 90 with wrist slightly extended R:  + at 90 degrees arm abd and wrist  neutral  L:  + at 70 with wrist neutral R:  + at 90 degrees arm abd and wrist  Fully extended  L:  + at 90 with wrist fully extended R:  + at 90 degrees arm abd and wrist  Fully extended  L:  + at 90 with wrist fully extended     Date 5/31/2018 6/21/2018 7/12/2018 10/4/2018 10/11/2018 10/17/2018   Median nerve                     Ulnar nerve    Radial nerve  R:  + at 90 degrees arm abd and wrist  Fully extended  L:  + at 90 with wrist fully extended R:  + at 70 degrees arm abd and wrist  Fully extended  L:  + at 70 with wrist fully extended R:  + at 90 degrees arm abd and wrist  Fully extended  L:  + at 90 with wrist fully extended R:  + at 80 degrees arm abd and wrist  partially extended  L:  + at 80 with wrist partially extended R:  + at 80 degrees arm abd and wrist  partially extended  L:  + at 90 with wrist fully extended R:  + at 90 degrees arm abd and wrist  partially extended  L:  + at 90 with wrist fully extended     Date 11/7/2018 11/21/2018       Median nerve                       Ulnar nerve    Radial nerve  R:  + at 80 degrees arm abd and partially wrist   extended  L:  + at 80 with wrist partially extended R:  + at 80 degrees arm abd and partially wrist   extended  L:  + at 80 with wrist partially extended             Cervical Screen: (+ or -)  Date 6/1/2017 3/9/2018      Active Active       Flexion - -      Extension - -      Lateral Flexion  Right - -     Lateral Flexion   Left - -     Rotation Right heaviness -     Rotation Left heaviness -     Compression Neck - -       TOS Special Tests  Date 2017 2017 2017 2017 2017 3/9/2018    Left Right Left Right Left Right Left Right Left Right Left Right   Inderjit Test + + + + - + small + + + after 35 sec + after 35 sec - + after 10 sec   Costoclavicular Test + + + + + after45 sec + after 15 sec small and ring - + after 45 sec + after 20 sec + after 20 sec + after 20 sec + after 10 sec   Rae's Test               Adson's Test                 Date 2018   2018   10/4/2018 2018             R L     Inderjit Test - -   - - + R,S -     Costoclavicular Test Full feeling Full feeling Full feeling Full feeling Full feeling Full feeling Full feeling Full feeling     Rae's Test             Adson's Test                   Posture:  []              Normal   []             Forward Neck Posture  [x]             Rounded Forward Shoulders  []             Shoulder Height Difference  Comments:    Patient's proximal musculature at shoulder appears imbalanced with tight pectoralis muscles, subscapularis, upper trapezius, lattisimus and weak scapular stabilizers.  This pattern contributes to overuse of distal musculature.    Edema:  [x]             None  []              Mild   []             Moderate  []              Severe     []              of affected part      Sensation: []              WNL throughout all nerve distributions; per patient report    [x]              Decreased  [x]              Median    [x]             Ulnar    []             Radial nerve distribution    []              See Sensory Evaluation    Description:    STRENGTH:   (Measured in pounds)     2017   2017   2017 10/19/2017     Trials Right Left Right Left Right Left  Right Left   1  2  3  Av#-       65#-       65#       68#       70#       71#       65#       75#      2017 3/9/2018 2018 2018   Trials Right  Left Right Left Right Left  Right Left   1  2  3  Av#       70#       70#       65#       70#       65#       70#       62#      10/4/2018 2018     Trials Right Left Right Left Right Left  Right Left   1  2  3  Av#       65#       74#       72#           3 pt Pinch 2017 2017 3/9/2018 2018   Trials Right Left Right Left Right Left  Right Left   1  2  3  Av#-       17#-       19#       19#       19#       19#       19#       19#     3 pt Pinch 10/4/2018 2018     Trials Right Left Right Left Right Left  Right Left   1  2  3  Av#-       19#-       20#-       20#-           Lateral Pinch 2017   2017   3/9/2018   2018     Trials Right Left Right Left Right Left  Right Left   1  2  3  Av#-       21#-       20#       21#       17#       19#       17#       19#     Lateral Pinch 10/4/2018 2018     Trials Right Left Right Left Right Left  Right Left   1  2  3  Av#       19#       20#-       22#-         Home Exercise Program:  Foam roller massage and stretch pecs  Icing prn  Proximal median nerve glide  Massage fa and upper arm (bicep) (partner to use roller bar to massage)  Clock pec stretch  Latissimus stretch  FM LEP (bilateral)  Extensor stretches  Table top flexor stretch  tricep stretch  Table top flexor stretch  Ragley massage  Epsom salts  Can massage tricep  Core strengthening and Lower trap squeezes with nerve gliding  Trigger massage to outer elbow with flexion / extension of elbow  Neck lateral bend with median nerve glide  Use long dowel to push into tight pecs  Back extension exercises over therapy ball  Bullet prosource foam roller for deeper massage  Spiky ball massage to flexor wad and pec area  Back extension over ball  Lower abdominal strengthening-raise legs up and lower slowly  Ulnar and radial FM wrists; SCS bilateral ulnar wrists    Next Visit:  Laser  Mfr  Stretching  Nerve gliding    Posture and back strengthening

## 2018-11-25 DIAGNOSIS — F41.9 ANXIETY: ICD-10-CM

## 2018-11-26 NOTE — TELEPHONE ENCOUNTER
"Last Written Prescription Date:  3/27/18  Last Fill Quantity: 90,  # refills: 3   Last office visit: 8/10/2018 with prescribing provider:     Future Office Visit:      Requested Prescriptions   Pending Prescriptions Disp Refills     hydrOXYzine (ATARAX) 25 MG tablet [Pharmacy Med Name: HYDROXYZINE HCL 25MG TABS (WHITE)] 60 tablet 0     Sig: TAKE 1 TO 2 TABLETS(25 TO 50 MG) BY MOUTH EVERY 6 HOURS AS NEEDED FOR ANXIETY    Antihistamines Protocol Passed    11/25/2018  2:53 PM       Passed - Recent (12 mo) or future (30 days) visit within the authorizing provider's specialty    Patient had office visit in the last 12 months or has a visit in the next 30 days with authorizing provider or within the authorizing provider's specialty.  See \"Patient Info\" tab in inbasket, or \"Choose Columns\" in Meds & Orders section of the refill encounter.             Passed - Patient is age 3 or older    Apply age and/or weight-based dosing for peds patients age 3 and older.    Forward request to provider for patients under the age of 3.            "

## 2018-11-27 RX ORDER — HYDROXYZINE HYDROCHLORIDE 25 MG/1
TABLET, FILM COATED ORAL
Qty: 60 TABLET | Refills: 2 | Status: SHIPPED | OUTPATIENT
Start: 2018-11-27 | End: 2018-12-18

## 2018-11-27 NOTE — TELEPHONE ENCOUNTER
Prescription approved per [facility] Refill Protocol.  Farzana JOHNSON RN

## 2019-01-02 ENCOUNTER — THERAPY VISIT (OUTPATIENT)
Dept: OCCUPATIONAL THERAPY | Facility: CLINIC | Age: 49
End: 2019-01-02
Payer: COMMERCIAL

## 2019-01-02 DIAGNOSIS — G56.03 BILATERAL CARPAL TUNNEL SYNDROME: ICD-10-CM

## 2019-01-02 PROCEDURE — 97110 THERAPEUTIC EXERCISES: CPT | Mod: GO | Performed by: OCCUPATIONAL THERAPIST

## 2019-01-02 PROCEDURE — 97026 INFRARED THERAPY: CPT | Mod: GO | Performed by: OCCUPATIONAL THERAPIST

## 2019-01-02 PROCEDURE — 97140 MANUAL THERAPY 1/> REGIONS: CPT | Mod: GO | Performed by: OCCUPATIONAL THERAPIST

## 2019-01-02 PROCEDURE — 97112 NEUROMUSCULAR REEDUCATION: CPT | Mod: GO | Performed by: OCCUPATIONAL THERAPIST

## 2019-01-02 NOTE — PROGRESS NOTES
Progress Note - Hand Therapy    Current Date:  1/2/2019    Diagnosis:  Bilateral hand pain/weakness  DOI:  October 2015    Number of visits to date:  28    Reporting period is 11/7/2018 to 1/2/2019.    Subjectve:   Subjective changes as noted by patient:  I have been really sick and lying around a lot.  My hands are getting really numb when I lay them on my stomach.  Functional changes noted by patient:  Decreased Performance in Self Care Tasks (dressing, eating, bathing), Sleep Patterns and Household Chores  Patient has noted adverse reaction to:  None        Objective:  Pain Level Report  VAS(0-10) 6/1/2017 6/8/2017 6/15/2017 6/20/2017 6/29/2017 9/1/2017 9/14/2017 9/21/2017   At Rest: 7 tingling  R: 6  L: 6 7 tingling  R: 6  L: 6 7 tingling  R: 6  L: 6 7 tingling  R: 6  L: 6 6-7 tingling  R: 5  L: 5 5 tingling  R: 3  L: 3 4 tingling  R: 3  L: 3 3 tingling  R: 3  L: 3   With Use: R: 8  L: 8 R: 8  L: 8 R: 8  L: 8 R: 8  L: 8 R: 8  L: 8 R: 6-7  L: 6-7 R: 6  L: 6 R: 5  L: 5     VAS(0-10) 9/28/2017 10/5/2017 10/19/17 11/2/2017 11/9/2017 11/14/17 11/30/17 12/7/2017   At Rest: 3 tingling  R: 3  L: 3 3 tingling  R: 3  L: 3 5 tingling  R: 5  L: 5 7 tingling  R: 7  L: 7 6 tingling  R: 5  L: 5 6 tingling  R: 5  L: 5 6 tingling  R: 5  L: 5 6 tingling  R: 5  L: 5   With Use: R: 5  L: 5 R: 5-6  R elbow 6-8  L: 5-6 R: 7-8  R elbow 3  L: 6 R: 9  R elbow 3  L: 9 R: 8  R elbow 1  L: 8 R: 8  R elbow 1  L: 8 R: 8  R elbow 1  L: 8 R: 8  B elbows 2  L: 8     VAS(0-10) 3/9/2018 4/12/2018 4/27/2018 5/10/2018 5/31/2018 6/21/2018 7/12/2018 10/4/2018   At Rest: 3-4 tingling  R: 3  L: 3 4 tingling  R: 5  L: 3 0-1 tingling  R: 1  L: 1 0  tingling  R: 0  L: 0 R: 0  L: 0 R: 0  L: 0 R: 0  L: 0 R: 0  L: 0   With Use: R: 5  B elbows 0  L: 5 R: 5  B elbows 0  L: 5 R: 0-1  B elbows 0  L: 1-2 R: 0-1  B elbows 0  L: 1-2 R: wrist pain 5/10 bilateral R: wrist pain 5/10 bilateral R: wrist pain 3/10 bilateral;  Elbow pain (medial) 5/10 R: wrist pain 3/10  bilateral;  Elbow pain (medial) 2/10     VAS(0-10) 10/11/18 10/17/18 11/7/2018 11/21/18 1/2/2019      At Rest: R: 0  L: 0 R: 0  L: 0 R: 0  L: 0 R: 0  L: 0 R: 0  L: 0      With Use: R: wrist pain 0/10;  L: arm pain 5/10 R: wrist pain 0/10;  L: arm pain 3/10 R: wrist pain 2/10;  L: arm pain 2/10 R: wrist pain 2/10;  L: arm pain 2/10 R: wrist pain 2/10 (numbness);  L: arm pain 2/10 (numbness)          Report of Pain:  Location:  All fingers  Description:  pain  Frequency:  constant    Duration:  lingers  Exacerbated by:  use  Relieved by:  meds (somewhat)  Progression:still exacerbated    Special Tests:   Date 6/1/2017 6/29/2017 9/1/2017 11/30/2017 3/9/2018    Left Right Left Right Left Right Left Right Left Right   Tinels CT             Tinels PIN             Tinels DSRN             Tinels cubital tunnel             Tinels [place]             Phalen's   + + - + - - - - - -   Elbow Flexion Test   + + - - - - - + + R,S + R,S     Date 5/31/2018 7/12/2018 10/4/2018 11/7/2018     Left Right Left Right Left Right Left Right Left Right   Tinels CT             Tinels PIN             Tinels DSRN             Tinels cubital tunnel             Tinels [place]             Phalen's         - - - -   Elbow Flexion Test   + R, S + R, S + R, S + R, S - - - - + L, R + palm       ULTT ( % of full glide )  Date 6/1/2017 6/8/17 6/20/2017 6/29/2017 9/1/2017 9/14/2017   Median nerve                 Ulnar nerve    Radial nerve  R:  + at 90 degrees arm abd and wrist extended  L:  + at 60 with wrist extended  R:  + at 90 degrees arm abd and wrist extended  L:  + at 60 with wrist extended R:  + at 60 degrees arm abd and wrist straight  L:  + at 60 with wrist extended R:  + at 45 degrees arm abd and wrist straight  L:  + at 45 with wrist extended R:  + at 80 degrees arm abd and wrist straight  L:  + at 80 with wrist extended R:  + at 80 degrees arm abd and wrist straight  L:  + at 60 with wrist extended     Date 9/21/2017 9/28/2017  10/5/2017 11/2/2017 11/9/2017 11/14/2017   Median nerve                   Ulnar nerve    Radial nerve  R:  + at 90 degrees arm abd and wrist extended  L:  + at 70 with wrist extended  R:  + at 90 degrees arm abd and wrist extended  L:  + at 90 with wrist extended  R:  + at 80 degrees arm abd and wrist extended  L:  + at 80 with wrist extended  R:  + at 90 degrees arm abd and wrist neutral  L:  + at 60 with wrist extended R:  + at 90 degrees arm abd and wrist 45 degree extension  L:  + at 90 with wrist extended fully R:  + at 90 degrees arm abd and wrist 45 degree extension  L:  + at 90 with wrist extended fully     Date 11/30/2017 12/7/2017 3/9/2018 4/12/2018 4/27/2018 5/10/2018   Median nerve                     Ulnar nerve    Radial nerve  R:  + at 45 degrees arm abd and wrist neutral  L:  + at 90 with wrist neutral R:  Full nerve glide  L:  Full nerve glide R:  + at 90 degrees arm abd and wrist  Slightly extended  L:  + at 90 with wrist slightly extended R:  + at 90 degrees arm abd and wrist  neutral  L:  + at 70 with wrist neutral R:  + at 90 degrees arm abd and wrist  Fully extended  L:  + at 90 with wrist fully extended R:  + at 90 degrees arm abd and wrist  Fully extended  L:  + at 90 with wrist fully extended     Date 5/31/2018 6/21/2018 7/12/2018 10/4/2018 10/11/2018 10/17/2018   Median nerve                     Ulnar nerve    Radial nerve  R:  + at 90 degrees arm abd and wrist  Fully extended  L:  + at 90 with wrist fully extended R:  + at 70 degrees arm abd and wrist  Fully extended  L:  + at 70 with wrist fully extended R:  + at 90 degrees arm abd and wrist  Fully extended  L:  + at 90 with wrist fully extended R:  + at 80 degrees arm abd and wrist  partially extended  L:  + at 80 with wrist partially extended R:  + at 80 degrees arm abd and wrist  partially extended  L:  + at 90 with wrist fully extended R:  + at 90 degrees arm abd and wrist  partially extended  L:  + at 90 with wrist fully extended      Date 11/7/2018 11/21/2018 1/2/2019        Median nerve                       Ulnar nerve    Radial nerve  R:  + at 80 degrees arm abd and partially wrist   extended  L:  + at 80 with wrist partially extended R:  + at 80 degrees arm abd and partially wrist   extended  L:  + at 80 with wrist partially extended R:  + at 40 degrees arm abd and partially wrist   extended  L:  + at 60 with wrist partially extended            Cervical Screen: (+ or -)  Date 6/1/2017 3/9/2018      Active Active       Flexion - -      Extension - -      Lateral Flexion  Right - -     Lateral Flexion  Left - -     Rotation Right heaviness -     Rotation Left heaviness -     Compression Neck - -       TOS Special Tests  Date 6/1/2017 6/29/2017 9/1/2017 11/9/2017 11/30/2017 3/9/2018    Left Right Left Right Left Right Left Right Left Right Left Right   Inderjit Test + + + + - + small + + + after 35 sec + after 35 sec - + after 10 sec   Costoclavicular Test + + + + + after45 sec + after 15 sec small and ring - + after 45 sec + after 20 sec + after 20 sec + after 20 sec + after 10 sec   Rae's Test               Adson's Test                 Date 5/31/2018   7/12/2018   10/4/2018 11/7/2018   1/2/2019      R L R L R L R L R L   Inderjit Test - -   - - + R,S - + L, R + L, R   Costoclavicular Test Full feeling Full feeling Full feeling Full feeling Full feeling Full feeling Full feeling Full feeling + after 30 sec + after10 sec   Rae's Test             Adson's Test                   Posture:  []              Normal   []             Forward Neck Posture  [x]             Rounded Forward Shoulders  []             Shoulder Height Difference  Comments:    Patient's proximal musculature at shoulder appears imbalanced with tight pectoralis muscles, subscapularis, upper trapezius, lattisimus and weak scapular stabilizers.  This pattern contributes to overuse of distal musculature.    Edema:  [x]             None  []              Mild   []              Moderate  []              Severe     []              of affected part      Sensation: []              WNL throughout all nerve distributions; per patient report    [x]              Decreased  [x]              Median    [x]             Ulnar    []             Radial nerve distribution    []              See Sensory Evaluation    Description:    STRENGTH:   (Measured in pounds)     2017   2017   2017 10/19/2017     Trials Right Left Right Left Right Left  Right Left   1  2  3  Av#-       65#-       65#       68#       70#       71#       65#       75#      2017 3/9/2018 2018 2018   Trials Right Left Right Left Right Left  Right Left   1  2  3  Av#       70#       70#       65#       70#       65#       70#       62#      10/4/2018 2018 2019    Trials Right Left Right Left Right Left  Right Left   1  2  3  Av#       65#       74#       72#       71#       75#         3 pt Pinch 2017 2017 3/9/2018 2018   Trials Right Left Right Left Right Left  Right Left   1  2  3  Av#-       17#-       19#       19#       19#       19#       19#       19#     3 pt Pinch 10/4/2018 2018 2019    Trials Right Left Right Left Right Left  Right Left   1  2  3  Av#-       19#-       20#-       20#-       20#       18#         Lateral Pinch 2017   2017   3/9/2018   2018     Trials Right Left Right Left Right Left  Right Left   1  2  3  Av#-       21#-       20#       21#       17#       19#       17#       19#     Lateral Pinch 10/4/2018 2018 2019    Trials Right Left Right Left Right Left  Right Left   1  2  3  Av#       19#       20#-       22#-       22#       22#         Assessment:  Response to therapy has been decline in:  Paresthesias:  Median nerve - more intense numbness and tingling  Ulnar nerve - more intense numbness and tingling  Pain:  Less  overall.  Strength:  Slight decrease.    Overall Assessment:  Patient has experienced an exacerbation of symptoms.  STG/LTG:  STGoals have been reviewed and no progress has been made;  see goal sheet for details and changes.    Plan:  Frequency/Duration:  Recommend continuing to see patient  1 X a month, twice daily  for 1 visit and then patient gone for 2 months in [place].  Appropriateness of Rx I have re-evaluated this patient and find that the nature, scope, duration and intensity of the therapy is appropriate for the medical condition of the patient.    Recommendations for Continued Therapy    Home Exercise Program:  Foam roller massage and stretch pecs  Icing prn  Proximal median nerve glide  Massage fa and upper arm (bicep) (partner to use roller bar to massage)  Clock pec stretch  Latissimus stretch  FM LEP (bilateral)  Extensor stretches  Table top flexor stretch  tricep stretch  Table top flexor stretch  Bradley massage  Epsom salts  Can massage tricep  Core strengthening and Lower trap squeezes with nerve gliding  Trigger massage to outer elbow with flexion / extension of elbow  Neck lateral bend with median nerve glide  Use long dowel to push into tight pecs  Back extension exercises over therapy ball  Bullet prosource foam roller for deeper massage  Spiky ball massage to flexor wad and pec area  Back extension over ball  Lower abdominal strengthening-raise legs up and lower slowly  Ulnar and radial FM wrists; SCS bilateral ulnar wrists    Next Visit:  Laser  Mfr  Stretching  Nerve gliding   Posture and back strengthening

## 2019-03-12 DIAGNOSIS — G89.4 CHRONIC PAIN SYNDROME: ICD-10-CM

## 2019-03-13 NOTE — TELEPHONE ENCOUNTER
"Pending Prescriptions:                       Disp   Refills    DULoxetine (CYMBALTA) 30 MG capsule [Phar*90 cap*0            Sig: TAKE 1 CAPSULE BY MOUTH EVERY DAY    Last Written Prescription Date:  7/16/18  Last Fill Quantity: 90,  # refills: 2   Last office visit: 8/10/2018 with prescribing provider:     Future Office Visit:    Requested Prescriptions   Pending Prescriptions Disp Refills     DULoxetine (CYMBALTA) 30 MG capsule [Pharmacy Med Name: DULOXETINE DR 30MG CAPSULES] 90 capsule 0     Sig: TAKE 1 CAPSULE BY MOUTH EVERY DAY    Serotonin-Norepinephrine Reuptake Inhibitors  Passed - 3/12/2019  7:10 PM       Passed - Blood pressure under 140/90 in past 12 months    BP Readings from Last 3 Encounters:   08/10/18 138/88   07/16/18 134/88   04/30/18 135/89                Passed - Recent (12 mo) or future (30 days) visit within the authorizing provider's specialty    Patient had office visit in the last 12 months or has a visit in the next 30 days with authorizing provider or within the authorizing provider's specialty.  See \"Patient Info\" tab in inbasket, or \"Choose Columns\" in Meds & Orders section of the refill encounter.             Passed - Medication is active on med list       Passed - Patient is age 18 or older       Passed - No active pregnancy on record       Passed - No positive pregnancy test in past 12 months          "

## 2019-03-14 RX ORDER — DULOXETIN HYDROCHLORIDE 30 MG/1
CAPSULE, DELAYED RELEASE ORAL
Qty: 90 CAPSULE | Refills: 1 | Status: SHIPPED | OUTPATIENT
Start: 2019-03-14 | End: 2019-07-11

## 2019-03-14 NOTE — TELEPHONE ENCOUNTER
Filled per [facility] protocol.  Sent patient a my chart message to establish care with another provider.    FRANK TomlinN, RN  [facility]

## 2019-05-29 ENCOUNTER — OFFICE VISIT (OUTPATIENT)
Dept: FAMILY MEDICINE | Facility: CLINIC | Age: 49
End: 2019-05-29
Payer: COMMERCIAL

## 2019-05-29 VITALS
HEART RATE: 103 BPM | BODY MASS INDEX: 37.09 KG/M2 | HEIGHT: 63 IN | WEIGHT: 209.3 LBS | OXYGEN SATURATION: 98 % | RESPIRATION RATE: 16 BRPM | TEMPERATURE: 98.3 F | SYSTOLIC BLOOD PRESSURE: 138 MMHG | DIASTOLIC BLOOD PRESSURE: 92 MMHG

## 2019-05-29 DIAGNOSIS — K21.9 GASTROESOPHAGEAL REFLUX DISEASE, ESOPHAGITIS PRESENCE NOT SPECIFIED: ICD-10-CM

## 2019-05-29 DIAGNOSIS — Z12.11 COLON CANCER SCREENING: ICD-10-CM

## 2019-05-29 DIAGNOSIS — M79.7 FIBROMYALGIA: ICD-10-CM

## 2019-05-29 DIAGNOSIS — L65.9 PATCHY LOSS OF HAIR: ICD-10-CM

## 2019-05-29 DIAGNOSIS — G89.4 CHRONIC PAIN SYNDROME: Primary | ICD-10-CM

## 2019-05-29 DIAGNOSIS — R11.0 NAUSEA: ICD-10-CM

## 2019-05-29 PROCEDURE — 99214 OFFICE O/P EST MOD 30 MIN: CPT | Performed by: INTERNAL MEDICINE

## 2019-05-29 RX ORDER — CLOBETASOL PROPIONATE 0.5 MG/G
CREAM TOPICAL 2 TIMES DAILY
Qty: 30 G | Refills: 1 | Status: SHIPPED | OUTPATIENT
Start: 2019-05-29

## 2019-05-29 RX ORDER — TIZANIDINE HYDROCHLORIDE 2 MG/1
2 CAPSULE, GELATIN COATED ORAL 2 TIMES DAILY PRN
Qty: 60 CAPSULE | Refills: 1 | Status: SHIPPED | OUTPATIENT
Start: 2019-05-29 | End: 2019-06-18

## 2019-05-29 RX ORDER — ONDANSETRON 4 MG/1
4 TABLET, ORALLY DISINTEGRATING ORAL DAILY PRN
Qty: 30 TABLET | Refills: 3 | Status: SHIPPED | OUTPATIENT
Start: 2019-05-29

## 2019-05-29 RX ORDER — TRAMADOL HYDROCHLORIDE 50 MG/1
50 TABLET ORAL EVERY 6 HOURS PRN
Qty: 30 TABLET | Refills: 0 | Status: SHIPPED | OUTPATIENT
Start: 2019-05-29 | End: 2019-09-24

## 2019-05-29 RX ORDER — DIAZEPAM 5 MG
5 TABLET ORAL DAILY PRN
Qty: 30 TABLET | Refills: 0 | Status: SHIPPED | OUTPATIENT
Start: 2019-05-29 | End: 2020-07-09

## 2019-05-29 ASSESSMENT — ANXIETY QUESTIONNAIRES
5. BEING SO RESTLESS THAT IT IS HARD TO SIT STILL: SEVERAL DAYS
1. FEELING NERVOUS, ANXIOUS, OR ON EDGE: SEVERAL DAYS
6. BECOMING EASILY ANNOYED OR IRRITABLE: SEVERAL DAYS
IF YOU CHECKED OFF ANY PROBLEMS ON THIS QUESTIONNAIRE, HOW DIFFICULT HAVE THESE PROBLEMS MADE IT FOR YOU TO DO YOUR WORK, TAKE CARE OF THINGS AT HOME, OR GET ALONG WITH OTHER PEOPLE: SOMEWHAT DIFFICULT
2. NOT BEING ABLE TO STOP OR CONTROL WORRYING: SEVERAL DAYS
GAD7 TOTAL SCORE: 8
3. WORRYING TOO MUCH ABOUT DIFFERENT THINGS: SEVERAL DAYS
7. FEELING AFRAID AS IF SOMETHING AWFUL MIGHT HAPPEN: NOT AT ALL

## 2019-05-29 ASSESSMENT — PATIENT HEALTH QUESTIONNAIRE - PHQ9
5. POOR APPETITE OR OVEREATING: NEARLY EVERY DAY
SUM OF ALL RESPONSES TO PHQ QUESTIONS 1-9: 2

## 2019-05-29 ASSESSMENT — MIFFLIN-ST. JEOR: SCORE: 1530.57

## 2019-05-29 NOTE — PATIENT INSTRUCTIONS
Follow up with Pain Specialist.    Monitor your blood pressure twice a week.  Call doctor if:  -- your blood pressure for the top/upper number is greater than 140 or less than 90  -- your blood pressure for the bottom/lower number is greater than 90 or less than 60    Limit your daily sodium intake to 2500mg per day.    Don't forget to return your stool sample.    Follow up in 3 months for your full physical exam (please come in fasting).      Patient Education     Tips for a Low-Sodium Diet  If you have high blood pressure, heart failure, liver problems or kidney problems, you may need to watch your sodium intake. Too much sodium can cause thirst and shortness of breath. It can also make your body retain extra fluids.  You should limit the amount of sodium in your diet to mg per day.  Sodium is found in many foods. Most of our sodium comes from  processed  foods like canned soups, lunch meats and TV dinners.  A major source of sodium is salt, or sodium chloride. Salt is often used to preserve foods (extend their shelf life). We have gotten used to the taste of salt in our foods. When you start to limit your salt intake, you will notice the lack of salt. Give yourself time to adjust to the change.  Tips    To keep track of your sodium intake, write down the amount of sodium you eat for a of couple days. This gives you a good idea of which foods are high in sodium--and where you can cut back.    Do not add salt while cooking or at the table. In recipes, you can often use half the amount of salt without giving up flavor.    Do not add salt to water when making rice, pasta or potatoes.    Do not use lemon pepper--this is made with salt.    Use spices and herbs without the word  salt  in their names. Use herb blends like Mrs. Krueger.    When eating vegetables, meats, poultry or fish, choose fresh or frozen instead of canned foods.    Eat more homemade foods that are made from scratch. Avoid boxed rice, noodles or potato  dishes--these often contain salty seasonings.    Choose foods with the least amount of packaging. These are often lower in sodium .    Read food labels to learn the sodium content for suggested serving sizes. Choose foods with the least amount of sodium.  ? Choose foods labeled  low sodium.  These have less than 140 mg of sodium per serving.    Always double-check serving sizes. If you eat two servings of a food, you will get twice as much sodium.    When you go out to eat, ask that foods be made without added salt. Ask for condiments on the side, so you can control how much you use.    You will find foods with less sodium if you shop along the outer walls of the grocery store.    Do not use a salt substitute without your doctor s okay. Some products (like Fuentes Lite Salt) contain potassium. If you are taking certain medicines, these products could raise the level of potassium in your blood.  High-sodium foods    Salt or kosher salt (one teaspoon = 2,300 mg of sodium)    Seasonings (lemon pepper, garlic salt, sea salt, seasoning salt, etc.)    Meat tenderizers and marinades    Packaged sauces or gravies    Snack foods (chips, crackers, pork rinds, salted nuts)    Cheese (natural and processed)    Cottage cheese    Fast-food and restaurant meals    Most canned vegetables and vegetable juices    Pickled and cured foods (pickles, olives, sauerkraut)    Condiments (BBQ sauce, soy sauce, teriyaki sauce, steak sauce, salad dressing, ketchup, etc.)    Canned or boxed side dishes, such as macaroni and cheese, ramen noodles, Hamburger [place] and Rice-A-Richie    Frozen meals with more than 500 mg of sodium per serving    Monosodium glutamate (MSG)    Processed meats (bologna, ham, tam) and canned meats (SPAM, corned beef)    Soups and bouillon (canned, frozen or dried)    Canned tomato products (juice, spaghetti sauce, etc.)    Sports drinks such as Gatorade or Powerade    Foods covered in sauce, gravy or other coatings  (broccoli with cheese sauce, chicken fingers, etc.)    Biscuits and refrigerated rolls or breads

## 2019-05-29 NOTE — PROGRESS NOTES
"Subjective     Maegan Lira is a 48 year old female who presents to clinic today for the following health issues:     New Patient/Transfer of Care    Has chronic pain syndrome and fibromyalgia for which she takes Cymbalta, Tizanidine, and as-needed Valium and Tramadol.  Pain is fairly relieved on this regimen.    Has GERD controlled on Omeprazole and occasional nausea which is relieved by PRN Zofran.    Uses topical clobetasol for prevention of what appears to be an immune-mediated hair loss.      Past Medical History:   Diagnosis Date     Colon polyps     colonoscopy x 2  last year clear      Fibromyalgia      Herniated lumbar disc without myelopathy     MRI X 3 / hospitalized x 2 /pain management      Low back pain      Migraine     since age 18     Recurrent sinus infections        Review of Systems   Constitutional: Negative for fatigue.   Eyes: Negative for visual disturbance.   Respiratory: Negative for shortness of breath.    Cardiovascular: Negative for chest pain, palpitations and leg swelling.   Gastrointestinal: Negative for abdominal pain, nausea and vomiting.   Neurological: Negative for dizziness, weakness, light-headedness, numbness and headaches.       BP (!) 138/92   Pulse 103   Temp 98.3  F (36.8  C) (Oral)   Resp 16   Ht 1.588 m (5' 2.5\")   Wt 94.9 kg (209 lb 4.8 oz)   SpO2 98%   BMI 37.67 kg/m      Physical Exam   Constitutional: She is oriented to person, place, and time. No distress.   Eyes: Pupils are equal, round, and reactive to light.   Neck: No thyromegaly present.   Cardiovascular: Normal rate, regular rhythm and normal heart sounds.   Pulmonary/Chest: Effort normal and breath sounds normal. No respiratory distress.   Musculoskeletal: She exhibits no edema.   Neurological: She is alert and oriented to person, place, and time. Coordination normal.   Nursing note and vitals reviewed.        ICD-10-CM    1. Chronic pain syndrome G89.4 PAIN MANAGEMENT REFERRAL   2. Fibromyalgia " M79.7 diazepam (VALIUM) 5 MG tablet     tiZANidine (ZANAFLEX) 2 MG capsule     traMADol (ULTRAM) 50 MG tablet   3. Gastroesophageal reflux disease, esophagitis presence not specified K21.9 omeprazole (PRILOSEC) 20 MG DR capsule   4. Nausea R11.0 ondansetron (ZOFRAN-ODT) 4 MG ODT tab   5. Patchy loss of hair L65.9 clobetasol (TEMOVATE) 0.05 % external cream   6. Colon cancer screening Z12.11 Fecal colorectal cancer screen FIT       Patient Instructions   Follow up with Pain Specialist.    Monitor your blood pressure twice a week.  Call doctor if:  -- your blood pressure for the top/upper number is greater than 140 or less than 90  -- your blood pressure for the bottom/lower number is greater than 90 or less than 60    Limit your daily sodium intake to 2500mg per day.    Don't forget to return your stool sample.    Follow up in 3 months for your full physical exam (please come in fasting).      Patient Education     Tips for a Low-Sodium Diet  If you have high blood pressure, heart failure, liver problems or kidney problems, you may need to watch your sodium intake. Too much sodium can cause thirst and shortness of breath. It can also make your body retain extra fluids.  You should limit the amount of sodium in your diet to mg per day.  Sodium is found in many foods. Most of our sodium comes from  processed  foods like canned soups, lunch meats and TV dinners.  A major source of sodium is salt, or sodium chloride. Salt is often used to preserve foods (extend their shelf life). We have gotten used to the taste of salt in our foods. When you start to limit your salt intake, you will notice the lack of salt. Give yourself time to adjust to the change.  Tips    To keep track of your sodium intake, write down the amount of sodium you eat for a of couple days. This gives you a good idea of which foods are high in sodium--and where you can cut back.    Do not add salt while cooking or at the table. In recipes, you can often  use half the amount of salt without giving up flavor.    Do not add salt to water when making rice, pasta or potatoes.    Do not use lemon pepper--this is made with salt.    Use spices and herbs without the word  salt  in their names. Use herb blends like Mrs. Krueger.    When eating vegetables, meats, poultry or fish, choose fresh or frozen instead of canned foods.    Eat more homemade foods that are made from scratch. Avoid boxed rice, noodles or potato dishes--these often contain salty seasonings.    Choose foods with the least amount of packaging. These are often lower in sodium .    Read food labels to learn the sodium content for suggested serving sizes. Choose foods with the least amount of sodium.  ? Choose foods labeled  low sodium.  These have less than 140 mg of sodium per serving.    Always double-check serving sizes. If you eat two servings of a food, you will get twice as much sodium.    When you go out to eat, ask that foods be made without added salt. Ask for condiments on the side, so you can control how much you use.    You will find foods with less sodium if you shop along the outer walls of the grocery store.    Do not use a salt substitute without your doctor s okay. Some products (like Fuentes Lite Salt) contain potassium. If you are taking certain medicines, these products could raise the level of potassium in your blood.  High-sodium foods    Salt or kosher salt (one teaspoon = 2,300 mg of sodium)    Seasonings (lemon pepper, garlic salt, sea salt, seasoning salt, etc.)    Meat tenderizers and marinades    Packaged sauces or gravies    Snack foods (chips, crackers, pork rinds, salted nuts)    Cheese (natural and processed)    Cottage cheese    Fast-food and restaurant meals    Most canned vegetables and vegetable juices    Pickled and cured foods (pickles, olives, sauerkraut)    Condiments (BBQ sauce, soy sauce, teriyaki sauce, steak sauce, salad dressing, ketchup, etc.)    Canned or boxed side  dishes, such as macaroni and cheese, ramen noodles, Hamburger [place] and Rice-A-Richie    Frozen meals with more than 500 mg of sodium per serving    Monosodium glutamate (MSG)    Processed meats (bologna, ham, tam) and canned meats (SPAM, corned beef)    Soups and bouillon (canned, frozen or dried)    Canned tomato products (juice, spaghetti sauce, etc.)    Sports drinks such as Gatorade or Powerade    Foods covered in sauce, gravy or other coatings (broccoli with cheese sauce, chicken fingers, etc.)    Biscuits and refrigerated rolls or breads

## 2019-05-30 ENCOUNTER — TELEPHONE (OUTPATIENT)
Dept: PALLIATIVE MEDICINE | Facility: CLINIC | Age: 49
End: 2019-05-30

## 2019-05-30 ASSESSMENT — ANXIETY QUESTIONNAIRES: GAD7 TOTAL SCORE: 8

## 2019-05-30 NOTE — LETTER
May 30, 2019    Maegan Lira  [address]  [place] [address]    Dear Maegan,                                                                 Welcome to the [facility] [place].  We are located at [facility] [place] [address]. Your appointment at the [facility] has been scheduled on Tuesday, June 18th at 1:00PM with Peterson Chirinos MD .    At your first visit, you will meet your team of caregivers who will help you to develop pain management strategies that will last a lifetime. You will meet with our support staff to review your insurance information, and collect your co-payment if required by your insurance company. You will also meet with a medical pain specialist and care coordinator who will assess your pain and develop a plan of care for your successful pain rehabilitation. You should expect to spend 1-2 hours at your first visit with us. Usually, patients work with us for a period of 6-12 months, and eventually return to their primary doctor once their pain management has stabilized.      To help us make your visit go as smoothly as possible, please bring the following items with you on your visit:     Completed Pain Questionnaire enclosed in this packet.  If you do not bring the completed questionnaire, we may have to reschedule your appointment.  List of any medicines that you are currently taking or have been prescribed  Important NON-[place] medical information such as medical records or tests results (X-rays, or laboratory tests)  Your health insurance card  Financial resources to cover your co-payment or balance due at the time of service (cash, personal check, Visa, and MasterCard are acceptable methods of payment)     Due to the demand for new patient evaluations, you must notify the scheduling department 48 hours in advance if you are not able to keep this appointment. Failure to do so could affect your ability to reschedule with  our clinic. Please be aware that we will not prescribe any medications at your first visit.     Please call [phone] with any questions regarding your appointment. We look forward to meeting you and working to address your health care needs.     Sincerely,      [facility] [place]

## 2019-05-30 NOTE — TELEPHONE ENCOUNTER
Pain Management Center Referral      1. Confirmed address with patient? Yes  2. Confirmed phone number with patient? Yes  3. Confirmed referring provider? Yes  4. Is the PCP the same as the referring provider? Yes  5. Has the patient been to any previous pain clinics? Yes  (If yes, send NOLAN with welcome letter)  6. Which insurance are we to bill for this appointment?  [employer]    7. Informed pt of cancellation (48 hour) policy? Yes    REGARDING OPIOID MEDICATIONS: We will always address appropriateness of opioid pain medications, but we generally will not automatically take on a prescribing role. When we do take on prescribing of opioids for chronic pain, it is in collaboration with the referring physician for an intermediate period of time (months), with an expectation that the primary physician or provider will assume the prescribing role if medications are effective at stable doses with demonstrated compliance. Therefore, please do not assume that your prescribing responsibilities end on the day of pain clinic consultation.  8. Informed pt of prescribing policy? Yes    9.Please be aware that once you are established with a pain provider and location, you will need to continue have all future visits with that provider and location. It is best to determine what location is the most convenient for you and schedule with that one.    ** PATIENT INFORMED OF THIS POLICY Yes      9. Referring Provider: Bartolo Jordan    [facility]

## 2019-05-31 ENCOUNTER — MYC MEDICAL ADVICE (OUTPATIENT)
Dept: FAMILY MEDICINE | Facility: CLINIC | Age: 49
End: 2019-05-31

## 2019-06-03 PROCEDURE — 82274 ASSAY TEST FOR BLOOD FECAL: CPT | Performed by: INTERNAL MEDICINE

## 2019-06-05 DIAGNOSIS — Z12.11 COLON CANCER SCREENING: ICD-10-CM

## 2019-06-06 LAB — HEMOCCULT STL QL IA: NEGATIVE

## 2019-06-13 ENCOUNTER — OFFICE VISIT (OUTPATIENT)
Dept: FAMILY MEDICINE | Facility: CLINIC | Age: 49
End: 2019-06-13
Payer: COMMERCIAL

## 2019-06-13 VITALS
OXYGEN SATURATION: 97 % | HEIGHT: 63 IN | WEIGHT: 207 LBS | TEMPERATURE: 99.2 F | DIASTOLIC BLOOD PRESSURE: 100 MMHG | SYSTOLIC BLOOD PRESSURE: 145 MMHG | BODY MASS INDEX: 36.68 KG/M2 | HEART RATE: 83 BPM

## 2019-06-13 DIAGNOSIS — Z79.899 HIGH RISK MEDICATION USE: ICD-10-CM

## 2019-06-13 DIAGNOSIS — M35.00 SICCA SYNDROME (H): ICD-10-CM

## 2019-06-13 DIAGNOSIS — Z13.220 LIPID SCREENING: ICD-10-CM

## 2019-06-13 DIAGNOSIS — R03.0 ELEVATED BLOOD PRESSURE READING WITHOUT DIAGNOSIS OF HYPERTENSION: ICD-10-CM

## 2019-06-13 DIAGNOSIS — F33.42 RECURRENT MAJOR DEPRESSIVE DISORDER, IN FULL REMISSION (H): ICD-10-CM

## 2019-06-13 DIAGNOSIS — E66.01 MORBID OBESITY (H): Primary | ICD-10-CM

## 2019-06-13 PROCEDURE — 36415 COLL VENOUS BLD VENIPUNCTURE: CPT | Performed by: INTERNAL MEDICINE

## 2019-06-13 PROCEDURE — 93000 ELECTROCARDIOGRAM COMPLETE: CPT | Performed by: INTERNAL MEDICINE

## 2019-06-13 PROCEDURE — 80061 LIPID PANEL: CPT | Performed by: INTERNAL MEDICINE

## 2019-06-13 PROCEDURE — 80048 BASIC METABOLIC PNL TOTAL CA: CPT | Performed by: INTERNAL MEDICINE

## 2019-06-13 PROCEDURE — 84443 ASSAY THYROID STIM HORMONE: CPT | Performed by: INTERNAL MEDICINE

## 2019-06-13 PROCEDURE — 99214 OFFICE O/P EST MOD 30 MIN: CPT | Performed by: INTERNAL MEDICINE

## 2019-06-13 ASSESSMENT — MIFFLIN-ST. JEOR: SCORE: 1520.14

## 2019-06-13 NOTE — PATIENT INSTRUCTIONS
Proceed with your ambulatory blood pressure monitoring.   [phone]    Strictly count/monitor your calories with every meal/snack every day.    Maintain the following meal plan:    Entree - 300 calories per serving  Drink (high protein) - 150 calories per serving  Fruit or Vegetable - 100 calories per serving    Breakfast - Drink   Snack - Drink   Lunch - Entree + Fruit or Vegetable  Snack - Drink   Dinner - Entree + Entree + Fruit or Vegetable  Snack - Fruit or Vegetable    Monitor your weight once a week and set a weight-loss goal of at least 1-2 pounds per week.    Follow up in 1 month.

## 2019-06-13 NOTE — PROGRESS NOTES
"Subjective     Maegan Lira is a 48 year old female who presents to clinic today for the following health issues:    Patient returns from her recent visit to discuss weight management further.    BP is elevated as it was on her previous visit.  No associated symptoms observed, other than her chronic pain which may raise her BP.    Depression is in remission with Cymbalta, which she also takes for her chronic pain/fibromyalgia.    Has history of sicca syndrome but not too bothersome at this time.      Past Medical History:   Diagnosis Date     Colon polyps     colonoscopy x 2  last year clear      Fibromyalgia      Herniated lumbar disc without myelopathy     MRI X 3 / hospitalized x 2 /pain management      Low back pain      Migraine     since age 18     Recurrent sinus infections        Review of Systems   Constitutional: Negative for fatigue.   Eyes: Negative for visual disturbance.   Respiratory: Negative for shortness of breath.    Cardiovascular: Negative for chest pain, palpitations and leg swelling.   Gastrointestinal: Negative for abdominal pain, constipation, diarrhea, nausea and vomiting.   Musculoskeletal: Positive for myalgias (from her fibromyalgia).   Neurological: Negative for dizziness, weakness, light-headedness and headaches.   Psychiatric/Behavioral: Negative for dysphoric mood, hallucinations, self-injury and suicidal ideas. The patient is not nervous/anxious.        BP (!) 145/100 (BP Location: Right arm, Cuff Size: Adult Regular)   Pulse 83   Temp 99.2  F (37.3  C) (Oral)   Ht 1.588 m (5' 2.5\")   Wt 93.9 kg (207 lb)   SpO2 97%   BMI 37.26 kg/m      Physical Exam   Constitutional: She is oriented to person, place, and time. No distress.   Neck: No thyromegaly present.   Cardiovascular: Normal rate, regular rhythm and normal heart sounds.   Pulmonary/Chest: Effort normal and breath sounds normal. No respiratory distress.   Abdominal: Soft. There is no tenderness.   Neurological: She " is alert and oriented to person, place, and time. Coordination normal.   No tremors   Psychiatric: She has a normal mood and affect.   Vitals reviewed.        ICD-10-CM    1. Morbid obesity (H) E66.01    2. Elevated blood pressure reading without diagnosis of hypertension R03.0 24 Hour Blood Pressure Monitor - Adult     Basic metabolic panel   3. Recurrent major depressive disorder, in full remission (H) F33.42 TSH with free T4 reflex   4. Sicca syndrome (H) M35.00    5. High risk medication use Z79.899 EKG 12-lead complete w/read - Clinics   6. Lipid screening Z13.220 Lipid panel reflex to direct LDL Fasting       Patient Instructions   Proceed with your ambulatory blood pressure monitoring.   [phone]    Strictly count/monitor your calories with every meal/snack every day.    Maintain the following meal plan:    Entree - 300 calories per serving  Drink (high protein) - 150 calories per serving  Fruit or Vegetable - 100 calories per serving    Breakfast - Drink   Snack - Drink   Lunch - Entree + Fruit or Vegetable  Snack - Drink   Dinner - Entree + Entree + Fruit or Vegetable  Snack - Fruit or Vegetable    Monitor your weight once a week and set a weight-loss goal of at least 1-2 pounds per week.    Follow up in 1 month.

## 2019-06-14 LAB
ANION GAP SERPL CALCULATED.3IONS-SCNC: 9 MMOL/L (ref 3–14)
BUN SERPL-MCNC: 10 MG/DL (ref 7–30)
CALCIUM SERPL-MCNC: 9.1 MG/DL (ref 8.5–10.1)
CHLORIDE SERPL-SCNC: 105 MMOL/L (ref 94–109)
CHOLEST SERPL-MCNC: 176 MG/DL
CO2 SERPL-SCNC: 25 MMOL/L (ref 20–32)
CREAT SERPL-MCNC: 0.89 MG/DL (ref 0.52–1.04)
GFR SERPL CREATININE-BSD FRML MDRD: 75 ML/MIN/{1.73_M2}
GLUCOSE SERPL-MCNC: 89 MG/DL (ref 70–99)
HDLC SERPL-MCNC: 53 MG/DL
LDLC SERPL CALC-MCNC: 93 MG/DL
NONHDLC SERPL-MCNC: 123 MG/DL
POTASSIUM SERPL-SCNC: 3.9 MMOL/L (ref 3.4–5.3)
SODIUM SERPL-SCNC: 139 MMOL/L (ref 133–144)
TRIGL SERPL-MCNC: 150 MG/DL
TSH SERPL DL<=0.005 MIU/L-ACNC: 1.68 MU/L (ref 0.4–4)

## 2019-06-17 NOTE — PROGRESS NOTES
[facility] Consultation    Date of visit: 6/18/2019    Reason for consultation:    Maegan Lira is a 48 year old female who is seen in consultation today at the request of her primary care physician, Bartolo Jordan.     Consultation and Evaluation for: chronic pain    Review of [place] Prescription Monitoring Program (): Today I have also reviewed the patient's history of controlled substance use, as provided by [place] licensed pharmacies and prescriber dispensers.     Review of Pain Questionnaire: Please see the [facility] health questionnaire, which the patient completed and reviewed with me in detail.    Review of Electronic Chart: Today I have also reviewed available medical information in the patient's medical record at [place] ([facility]), including relevant provider notes, laboratory work, and imaging.     Maegan Lira has been seen at a pain clinic in the past.  Seen by harish singh in 2016    Chief Complaint:    Chief Complaint   Patient presents with     Pain       Pain history:  Maegan iLra is a 48 year old female who presents for initial evaluation of chief pain complaint of chronic pain.     She was diagnosed with fibromyalgia in 2001 and would have intermittent flares. In 2015 she had a herniated disc in her low back that flared up her back pain and she was also diagnosed with carpal tunnel and tarsal tunnel around this time. When these issues were being worked on her activity levels were limited and her pain started being more impactful around this time.  She is on partial disability due to her chronic pain. She works about 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. The pain is typically exacerbated by prolonged positioning or prolonged/sustained activity.    She continues to have intermittent hand numbness which impacts all the fingers in both hands but not any specific fingers consistently. She has been told that there is  "some irritation of nerves in her arm pits and at the elbows that are the cause of her symptoms. The other pain that limits her is stiffness in the calves and ankles. She currently is working with hand therapy and neuromuscular therapy.    She has been having increasing left buttock pain that radiates down the back of her thigh to the knee. The pain typically does not radiate below the knee on the left side. She describes this as an ache. This has been present over the past 9 months and she thinks it may be related to positioning during sex.     When she is having moderate-severe pain she takes aleve 375mg, she waits 6 hours or so and if there isn't any improvement she takes tramadol 50mg. She takes tramadol a couple times/month. The only medications she takes regularly are cymbalta and hydroxyzine. The hydroxyzine helps her sleep.        Onset: \"2001-just falres, 2009 - injury, more falres, 2015 became chronic)  Location/Radiation: throughout the body  Quality: aching, sharp, shooting  Severity/Intensity: 10/10 at worst, 2/10 at best, 6/10 on average  Aggravating factors include: \"sitting, standing, laying, walking too long\"  Relieving factors include: \"regular movement, stretching, swimming, massage therapy\"  Red Flags: The patient denies bowel or bladder incontinence, parasthesias, weakness, saddle anesthesia, unintentional weight loss, or fever/chills/sweats.     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 She manages her chronic pain via a regimented program of [place] occupational hand/arm therapy, holistic massage and chiropractic care. Has seen neurology, podiatry and rheumatology in the past but no longer needs to follow with those consultants. Does take Cymbalta daily and Hydroxyzine PRN. Rarely takes Tramadol (very old Rx), Tizanidine (spasms), Valium (spasms) and " Butalbital (migraines).           Pain Treatments:  1. Medications:       Current pain medications:  -Tizanidine 2mg bid prn  -Tramadol 50mg q6h prn  -Duloxetine 30mg daily  -Valium 5mg prn  -naproxen 375mg occasionally  -hydroxyzine 25mg once daily  -Butalbital 40mg prn       Previous pain medications:  -T3, Hydrodone, oxycodone, not helpful with reactions  -Dilaudid - h  -Sumatriptan - nh  -flexeril - h  -tizanidine - helpful, made her sleepy  -skelaxin - nh  -gabapentin - brain fog  -Tylenol - h  2. Physical Therapy: helpful, doing exercises provided by her chiropractor currently     TENS unit: nh  3. Pain psychology: helpful  4. Surgery: none  5. Injections: epidurals in neck and back with relief in the past  6. Alternative Therapies:    Chiropractic: helpful, last in 2019    Acupuncture: [facility]      Diagnostic tests:  MRI of C-spine was completed on 5/16/17 was reviewed with the patient and shows:  FINDINGS: There is normal alignment of the cervical vertebrae;  however, there is straightening of normal cervical lordosis. Vertebral  body heights of the cervical spine are normal. Marrow signal  throughout the cervical vertebrae is within normal limits. There is no  evidence for fracture or pathologic bony lesion of the cervical spine.        There is loss of disc height, disc desiccation and posterior disc  bulging to varying degrees at all levels of the cervical spine.       The cervical spinal cord is mildly deformed by degenerative changes at  the C4-C5 level. The cervical spinal cord is otherwise normal in  contour. There is no abnormal signal in the cervical spinal cord.  There is no evidence for intrathecal abnormality.     Level by level:     C2-C3: There is facet arthropathy bilaterally, uncinate hypertrophy  bilaterally and a posterior broad-based disc-osteophyte complex. There  is no spinal canal or neural foraminal stenosis at this level.     C3-C4: There is facet arthropathy bilaterally, uncinate  hypertrophy  bilaterally and a posterior broad-based disc-osteophyte complex with a  superimposed tiny posterior central disc herniation (protrusion).  There is no spinal canal or neural foraminal stenosis at this level.     C4-C5: There is facet arthropathy bilaterally, uncinate hypertrophy  bilaterally and a posterior broad-based disc-osteophyte complex with a  superimposed small posterior broad-based disc herniation (protrusion).  These findings result in moderate spinal canal stenosis with mild  flattening of the anterior surface of the cervical spinal cord,  mild-moderate left foraminal narrowing and mild right foraminal  narrowing.     C5-C6: There is facet arthropathy bilaterally, uncinate hypertrophy  bilaterally and a posterior broad-based disc-osteophyte complex. These  findings result in mild bilateral neural foraminal narrowing and mild  spinal canal narrowing.     C6-C7: There is facet arthropathy bilaterally, uncinate hypertrophy  bilaterally and a posterior broad-based disc-osteophyte complex. These  findings result in moderate-severe left foraminal stenosis, but no  spinal canal or right foraminal narrowing.     C7-T1: There is facet arthropathy bilaterally, uncinate hypertrophy  bilaterally and a posterior broad-based disc-osteophyte complex. There  is no spinal canal or neural foraminal stenosis at this level.                                                                      IMPRESSION: Degenerative changes of the cervical spine as described  above.      DOMINIQUE NELSON MD      Labs:   Lab Results   Component Value Date    WBC 4.2 07/15/2017     Lab Results   Component Value Date    RBC 4.64 07/15/2017     Lab Results   Component Value Date    HGB 14.5 07/15/2017     Lab Results   Component Value Date    HCT 41.2 07/15/2017     Lab Results   Component Value Date    MCV 89 07/15/2017     Lab Results   Component Value Date    MCH 31.3 07/15/2017     Lab Results   Component Value Date    MCHC 35.2  07/15/2017     Lab Results   Component Value Date    RDW 13.2 07/15/2017     Lab Results   Component Value Date     07/15/2017     Last Comprehensive Metabolic Panel:  Sodium   Date Value Ref Range Status   06/13/2019 139 133 - 144 mmol/L Final     Potassium   Date Value Ref Range Status   06/13/2019 3.9 3.4 - 5.3 mmol/L Final     Chloride   Date Value Ref Range Status   06/13/2019 105 94 - 109 mmol/L Final     Carbon Dioxide   Date Value Ref Range Status   06/13/2019 25 20 - 32 mmol/L Final     Anion Gap   Date Value Ref Range Status   06/13/2019 9 3 - 14 mmol/L Final     Glucose   Date Value Ref Range Status   06/13/2019 89 70 - 99 mg/dL Final     Urea Nitrogen   Date Value Ref Range Status   06/13/2019 10 7 - 30 mg/dL Final     Creatinine   Date Value Ref Range Status   06/13/2019 0.89 0.52 - 1.04 mg/dL Final     GFR Estimate   Date Value Ref Range Status   06/13/2019 75 >60 mL/min/[1.73_m2] Final     Comment:     Non  GFR Calc  Starting 12/18/2018, serum creatinine based estimated GFR (eGFR) will be   calculated using the Chronic Kidney Disease Epidemiology Collaboration   (CKD-EPI) equation.       Calcium   Date Value Ref Range Status   06/13/2019 9.1 8.5 - 10.1 mg/dL Final     Bilirubin Total   Date Value Ref Range Status   07/15/2017 0.3 0.2 - 1.3 mg/dL Final     Alkaline Phosphatase   Date Value Ref Range Status   07/15/2017 78 40 - 150 U/L Final     ALT   Date Value Ref Range Status   07/15/2017 25 0 - 50 U/L Final     AST   Date Value Ref Range Status   07/15/2017 17 0 - 45 U/L Final                 Past Medical History:  Past Medical History:   Diagnosis Date     Colon polyps     colonoscopy x 2  last year clear      Fibromyalgia      Herniated lumbar disc without myelopathy     MRI X 3 / hospitalized x 2 /pain management      Low back pain      Migraine     since age 18     Recurrent sinus infections        Past Surgical History:  Past Surgical History:   Procedure Laterality Date      CATARACT IOL, RT/LT Bilateral 2018     GYN SURGERY      c section x2     ORTHOPEDIC SURGERY      rt foot     TONSILLECTOMY         Medications:  Current Outpatient Medications   Medication Sig Dispense Refill     Butalbital-APAP-Caffeine -40 MG CAPS Take one capsule as needed for migraine. Max one capsule daily. Do not take with opiate pain medications including oxycodone. 30 capsule 1     clobetasol (TEMOVATE) 0.05 % external cream Apply topically 2 times daily Do not use for more than 14 consecutive days 30 g 1     diazepam (VALIUM) 5 MG tablet Take 1 tablet (5 mg) by mouth daily as needed for muscle spasms If you take this medication, do not take any other muscle relaxants. 30 tablet 0     DULoxetine (CYMBALTA) 20 MG capsule Take 1 capsule (20 mg) by mouth daily 14 capsule 0     DULoxetine (CYMBALTA) 30 MG capsule TAKE 1 CAPSULE BY MOUTH EVERY DAY 90 capsule 1     hydrOXYzine (ATARAX) 25 MG tablet TAKE 1 TO 2 TABLETS(25 TO 50 MG) BY MOUTH EVERY 6 HOURS AS NEEDED FOR ANXIETY 60 tablet 2     methocarbamol (ROBAXIN) 500 MG tablet Take 1 tablet (500 mg) by mouth 3 times daily as needed for muscle spasms 90 tablet 0     naproxen (NAPROSYN) 375 MG tablet Take 1 tablet (375 mg) by mouth 2 times daily as needed for moderate pain 60 tablet 3     omeprazole (PRILOSEC) 20 MG DR capsule TAKE 1 CAPSULE BY MOUTH DAILY AS NEEDED 90 capsule 1     ondansetron (ZOFRAN-ODT) 4 MG ODT tab Take 1 tablet (4 mg) by mouth daily as needed for nausea 30 tablet 3     pregabalin (LYRICA) 25 MG capsule Take 1 capsule (25 mg) by mouth 2 times daily 60 capsule 0     senna-docusate (KATIANA-COLACE) 8.6-50 MG per tablet Take 1-2 tablets by mouth 2 times daily as needed for constipation 90 tablet 3     traMADol (ULTRAM) 50 MG tablet Take 1 tablet (50 mg) by mouth every 6 hours as needed for severe pain 30 tablet 0     UNABLE TO FIND Take 10 drops by mouth 2 times daily MEDICATION NAME: CBD Max Hemp Oil         Allergies:     Allergies    Allergen Reactions     Ampicillin      Codeine      Doxycycline GI Disturbance     Keflex [Cephalexin] Rash     Latex Rash       Social History:  Home situation: lives with wife and 2 dogs  Occupation/Schooling: , 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  Tobacco use: denies  Drug use: deneis  Alcohol use: 4 drinks/week  History of chemical dependency treatment: denies  Mental health admissions: denies    Family history:  Family History   Problem Relation Age of Onset     Cervical Cancer Mother      Anesthesia Reaction Mother      Eye Surgery Mother      Arthritis Mother      Genetic Disorder Mother      Asthma Father      Obesity Father      Aneurysm Maternal Grandmother      Hypertension Maternal Grandmother      Osteoporosis Maternal Grandmother      Colon Cancer Maternal Grandfather      Prostate Cancer Maternal Grandfather      Osteoporosis Paternal Grandmother      Obesity Paternal Grandmother      Obesity Paternal Grandfather      Coronary Artery Disease Brother      Hyperlipidemia Brother      Depression Brother      Anxiety Disorder Brother      Mental Illness Brother      Substance Abuse Brother      Coronary Artery Disease Maternal Aunt      Hyperlipidemia Maternal Aunt      Diabetes Paternal Aunt      Hypertension Paternal Aunt      Kidney Disease Paternal Aunt      Obesity Paternal Aunt      Obesity Daughter        Review of Systems:    POSTIVE IN BOLD  GENERAL: fever/chills, fatigue, general unwell feeling, weight gain/loss.  HEAD/EYES:  headache, dizziness, or vision changes.    EARS/NOSE/THROAT:  Nosebleeds, hearing loss, sinus infection, earache, tinnitus.  IMMUNE:  Allergies, cancer, immune deficiency, or infections.  SKIN:  Urticaria, rash, hives  HEME/Lymphatic:   anemia, easy bruising, easy bleeding.  RESPIRATORY:  cough, wheezing, or shortness of breath  CARDIOVASCULAR/Circulation:  Extremity edema, syncope, hypertension, tachycardia, or angina.  GASTROINTESTINAL:  abdominal pain, nausea/emesis,  diarrhea, constipation,  hematochezia, or melena.  ENDOCRINE:  Diabetes, steroid use,  thyroid disease or osteoporosis.  MUSCULOSKELETAL: neck pain, back pain, arthralgia, arthritis, or gout.  GENITOURINARY:  frequency, urgency, dysuria, difficulty voiding, hematuria or incontinence.  NEUROLOGIC:  weakness, numbness, paresthesias, seizure, tremor, stroke or memory loss.  PSYCHIATRIC:  depression, anxiety, stress, suicidal thoughts or mood swings.     Physical Exam:  Vitals:    06/18/19 1301 06/18/19 1304   BP: (!) 147/96 (!) 143/98   Pulse: 81    SpO2: 99%    Weight: 94.4 kg (208 lb 1.6 oz)      Exam:  Constitutional: Well developed, well nourished, appears stated age.  HEENT: Head atraumatic, normocephalic. Eyes without conjunctival injection or jaundice. Oropharynx clear. Neck supple. No obvious neck masses.  Cardiovascular: Regular rate/rhythm; no murmurs/rubs/gallops appreciated.  Respiratory: Lungs clear to auscultation bilaterally, no wheezing/rales/rhonchi.   Gastrointestinal: Normoactive bowel sounds. Abdomen soft, non-tender, without guarding/rebound.  Skin: No rash, lesions, or petechiae of exposed skin.   Extremities: Peripheral pulses intact. No clubbing, cyanosis, or edema.  Psychiatric/mental status: Alert, without lethargy or stupor. Speech fluent. Appropriate affect. Mood normal. Able to follow commands without difficulty.     Musculoskeletal exam:  Gait/Station/Posture: Normal stance, arm swing, and stride; no antalgia or Trendelenburg  Normal bulk and tone. Unremarkable spinal curvature. ASIS heights even.    Cervical spine:  Range of motion within normal limits   Myofascial tenderness: bilateral cervical paraspinals and shoulder girdle muscles  No focal spinous process tenderness.   Spurling's negative bilaterally.      Lumbar spine:  Range of motion within normal limits    Rotation/ext to right: painful    Rotation/ext to left: painful   Myofascial tenderness:  Bilateral lumbar paraspinals,  bilateral hip girdle muscles.  Focal tenderness: Biltaeral SI joint, gluteal, piriformis, and IT tenderness  SLR: negative  Sourav's maneuver: positive on the left for back pain  Gaenslen's positive on the left, sacral thrust negative.  Hip exam:   normal internal and external range of motion bilaterally. JERSON produces left sided back pain. Scour negative.     Neurologic exam:  CN:  Cranial nerves 2-12 are grossly intact  Motor Strength:  5/5 symmetric UE and LE strength  Reflexes:     Biceps C5:     R:  2/4 L: 2/4   Brachioradialis  C6: R:  2/4 L: 2/4   Triceps C7:  R:  2/4 L: 2/4   Patella L4:  R:  2/4 L: 2/4   Achilles S1:  R:  2/4 L: 2/4    Other reflexes:  Toes downgoing, No ankle clonus    Sensory:  (upper and lower extremities):   Light touch: normal     Fibromyalgia Assessment:    Myofascial tenderness:  diffuse   Tender point survey:  14/18 2010 ACR Criteria for fibromyalgia - scoring   Widespread pain index of > or equal to 7 - yes   Symptoms present for at least 3 months - yes   No other disorder to explain their pain - none   Symptoms Severity scale score > or equal to 5   Fatigue (0-3) - 2   Waking unrefreshed (0-3) - 2   Cognitive symptoms (0-3) - 1   Somatic Symptoms - 2  (None - 0, Few - 1, Moderate - 2, Great deal - 3)    Patient DOES meet the criteria for a diagnosis of Fibromyalgia.       Opioid Risk Tool (ORT) score is calculated as follows:   Family History of Substance Abuse:    Alcohol = 1 pt (yes, female)   Illegal Drugs = 2 pts (yes, female)   Prescription Drugs = 4 pts (yes, female)     Personal History of Substance Abuse:   Alcohol = 0 pt (no)   Illegal Drugs = 0 pt (no)   Prescription Drugs = 0 pt (no)   Age: 0 pt (age < 16; age > 45)     History of Pre-adolescent Sexual Abuse: 0 pt (no)     Psychological Disease: 1 pt (depression)         ORT Score = 8        0-3: Low risk for opioid abuse       4-7: Moderate risk for opioid abuse       >/= 8: High risk for opioid  abuse      Assessment:  Ms. Lira is a 48 year old with past medical history including: Tarsal tunnel, bilateral CTS, IBS, Sjogren's, depression, generalized anxiety who presents for evaluation and treatment of the following chronic pain conditions:    1. Fibromyalgia: She meets the 2010 ACR criteria for fibromyalgia. She has tried medications including gabapentin (foginess) and is currently on cymbalta 30mg. She presents today to discuss various medication treatment options for fibromyalgia and to optimize her medication regimen. She has worked with pain PT and pain phd in the past and continues to try to use the princples she has developed. She feels she is coping well and managing with her fibromyalgia symptoms at this time and defers referrals to pain PT and pain phd.    2. Left sacroiliac joint dysfunction: She has had left low back/buttock pain over the past 9 months which started after positioning during sex. She has been doing exercises provided by her masseuse and chiro with limited benefit. Does have findings suggestive of sacroiliac joint dysfunction on exam including positive samanta finger, psis/sij tenderness, positive baldev and gaenslen on the left.     Mental Health - the patient's mental health concerns, specifically anxiety/depression, affect her experience of pain and contribute to her clinically significant distress.        Plan:  The following recommendations were given to the patient. Diagnosis, treatment options, risks, benefits, and alternatives were discussed, and all questions were answered. The patient expressed understanding of the plan for management.     I am recommending a multidisciplinary treatment plan to help this patient better manage her pain.  This includes:     1. Physical Therapy: Continue doing exercises provided by current chiro/masseuse. If limited benefit, will consider referral to a formal PT program. Defer pain PT.  1. Clinical Health Pain Psychologist: Patient defers,  feels that she is coping well.  2. Self Care Recommendations: Gentle progressive exercise that does not increase pain - gradually increase daily walking program.  Take mini breaks - 5 minutes of mindfullness a couple times a day.   1. Discussed starting 5 minutes walk daily with long term goal of 25 minutes  3. Diagnostic Studies: none  4. Medication Management:  1. Decrease duloxetine to 20mg for 2 weeks then stop. Can consider savella if limited benefit from lyrica. She doesn't feel duloxetine has provided benefit at current dose (30mg daily) or prior 60mg dose. If pain increases or mood worsens after decreasing dose to 20mg would recommend going back to 30mg dose and consider increasing to 60mg.  2. Start lyrica 25mg hs and increase to BID if no side effects. Can increase further if no side effects at the next visit.  3. Start methocarbamol 500mg and increase to TID prn if no side effects.   4. She uses tramadol very sparingly, last prescription lasted 1+ year. Ok to continue this as long as there are no red flags.   5. Further procedures recommended: consider sacroiliac joint injection  6. Follow up: 1 month in clinic      I spent 50 minutes of time face to face with the patient.  Greater than 50% of this time was spent in patient counseling and/or coordination of care regarding principles of multidisciplinary care, medication management, and treatment options as discussed above.         Peterson Chirinos,   [facility]

## 2019-06-18 ENCOUNTER — OFFICE VISIT (OUTPATIENT)
Dept: PALLIATIVE MEDICINE | Facility: CLINIC | Age: 49
End: 2019-06-18
Payer: COMMERCIAL

## 2019-06-18 ENCOUNTER — HOSPITAL ENCOUNTER (OUTPATIENT)
Dept: CARDIOLOGY | Facility: CLINIC | Age: 49
Discharge: HOME | End: 2019-06-18
Attending: INTERNAL MEDICINE | Admitting: INTERNAL MEDICINE
Payer: COMMERCIAL

## 2019-06-18 VITALS
DIASTOLIC BLOOD PRESSURE: 98 MMHG | WEIGHT: 208.1 LBS | OXYGEN SATURATION: 99 % | BODY MASS INDEX: 37.46 KG/M2 | HEART RATE: 81 BPM | SYSTOLIC BLOOD PRESSURE: 143 MMHG

## 2019-06-18 DIAGNOSIS — M79.672 BILATERAL FOOT PAIN: ICD-10-CM

## 2019-06-18 DIAGNOSIS — F41.1 GAD (GENERALIZED ANXIETY DISORDER): ICD-10-CM

## 2019-06-18 DIAGNOSIS — M79.671 BILATERAL FOOT PAIN: ICD-10-CM

## 2019-06-18 DIAGNOSIS — G43.909 MIGRAINE WITHOUT STATUS MIGRAINOSUS, NOT INTRACTABLE, UNSPECIFIED MIGRAINE TYPE: ICD-10-CM

## 2019-06-18 DIAGNOSIS — G89.4 CHRONIC PAIN SYNDROME: ICD-10-CM

## 2019-06-18 DIAGNOSIS — G89.29 CHRONIC BILATERAL LOW BACK PAIN, WITH SCIATICA PRESENCE UNSPECIFIED: ICD-10-CM

## 2019-06-18 DIAGNOSIS — M54.5 CHRONIC BILATERAL LOW BACK PAIN, WITH SCIATICA PRESENCE UNSPECIFIED: ICD-10-CM

## 2019-06-18 DIAGNOSIS — M79.7 FIBROMYALGIA: Primary | ICD-10-CM

## 2019-06-18 DIAGNOSIS — F33.42 RECURRENT MAJOR DEPRESSIVE DISORDER, IN FULL REMISSION (H): ICD-10-CM

## 2019-06-18 DIAGNOSIS — R03.0 ELEVATED BLOOD PRESSURE READING WITHOUT DIAGNOSIS OF HYPERTENSION: ICD-10-CM

## 2019-06-18 PROCEDURE — 93790 AMBL BP MNTR W/SW I&R: CPT | Performed by: INTERNAL MEDICINE

## 2019-06-18 PROCEDURE — 99244 OFF/OP CNSLTJ NEW/EST MOD 40: CPT | Performed by: PHYSICAL MEDICINE & REHABILITATION

## 2019-06-18 PROCEDURE — 93788 AMBL BP MNTR W/SW A/R: CPT

## 2019-06-18 RX ORDER — METHOCARBAMOL 500 MG/1
500 TABLET, FILM COATED ORAL 3 TIMES DAILY PRN
Qty: 90 TABLET | Refills: 0 | Status: SHIPPED | OUTPATIENT
Start: 2019-06-18 | End: 2019-09-24

## 2019-06-18 RX ORDER — DULOXETIN HYDROCHLORIDE 20 MG/1
20 CAPSULE, DELAYED RELEASE ORAL DAILY
Qty: 14 CAPSULE | Refills: 0 | Status: SHIPPED | OUTPATIENT
Start: 2019-06-18 | End: 2019-07-23

## 2019-06-18 RX ORDER — PREGABALIN 25 MG/1
25 CAPSULE ORAL 2 TIMES DAILY
Qty: 60 CAPSULE | Refills: 0 | Status: SHIPPED | OUTPATIENT
Start: 2019-06-18 | End: 2019-08-27

## 2019-06-18 ASSESSMENT — ENCOUNTER SYMPTOMS
CONSTIPATION: 0
DIZZINESS: 0
HEADACHES: 0
VOMITING: 0
NERVOUS/ANXIOUS: 0
PALPITATIONS: 0
DIARRHEA: 0
NUMBNESS: 0
WEAKNESS: 0
MYALGIAS: 1
SHORTNESS OF BREATH: 0
DYSPHORIC MOOD: 0
SHORTNESS OF BREATH: 0
ABDOMINAL PAIN: 0
FATIGUE: 0
WEAKNESS: 0
FATIGUE: 0
PALPITATIONS: 0
ABDOMINAL PAIN: 0
NAUSEA: 0
NAUSEA: 0
LIGHT-HEADEDNESS: 0
HEADACHES: 0
VOMITING: 0
HALLUCINATIONS: 0
LIGHT-HEADEDNESS: 0
DIZZINESS: 0

## 2019-06-18 ASSESSMENT — PAIN SCALES - GENERAL: PAINLEVEL: MILD PAIN (3)

## 2019-06-18 NOTE — Clinical Note
Thanks for the referral. Will try other medications indicated for fibromyalgia. lyrica for now, possibly savella if no change with decreasing cymbalta dose.Thanks,[facility]

## 2019-06-18 NOTE — PATIENT INSTRUCTIONS
1. Decrease your cymbalta to 20mg daily for 2 weeks then you can stop this medication. If your pain or mood is worse  2. Start lyrica 25mg at night then 25mg twice daily after 3-4 days if no side effects.  3. Start methocarbamol 500mg, if no side effects you can take this 3 times daily as needed.  4. If you want to do the sacroiliac joint injection you can call the number below and I'll place the order.  5. Follow up in clinic in 1 month.    ----------------------------------------------------------------  Clinic Number:  [phone]   Call this number with any questions about your care and for scheduling assistance. Calls are returned Monday through Friday between 8 AM and 4:30 PM. We usually get back to you within 2 business days depending on the issue/request.       Medication refills:    For non-opioid medications, call your pharmacy directly to request a refill. The pharmacy will contact the [facility] for authorization. Please allow 3-4 days for these refills to be processed.     For opioid refills, call the clinic number or send a Quid message. Please contact us 7-10 days before your refill is due. The message MUST include the name of the specific medication(s) requested and how you would like to receive the prescription(s). The options are as follows:    Pain Clinic staff can mail the prescription to your pharmacy. Please tell us the name of the pharmacy.    You may pick the prescription up at the Pain Clinic (tell us the location) or during a clinic visit with your pain provider    Pain Clinic staff can deliver the prescription to the [facility] pharmacy in the clinic building. Please tell us the location.      We believe regular attendance is key to your success in our program.    Any time you are unable to keep your appointment we ask that you call us at least 24 hours in advance to let us know. This will allow us to offer the appointment time to another patient.

## 2019-06-19 ENCOUNTER — MYC MEDICAL ADVICE (OUTPATIENT)
Dept: PALLIATIVE MEDICINE | Facility: CLINIC | Age: 49
End: 2019-06-19

## 2019-06-19 DIAGNOSIS — G89.4 CHRONIC PAIN SYNDROME: ICD-10-CM

## 2019-06-19 NOTE — TELEPHONE ENCOUNTER
My chart sent from patient:    Good morning Dr Chirinos,   It was a pleasure meeting with you yesterday. I was unable to get my rx for Lyrica... my insurance considers this a Level 2 drug and it was going to cost me over $400.00 to fill it. The pharmacist was not aware of a generic. Do you know of a generic or an alternative that you think I should try, or should I just continue with Cymbalta?   Thanks,   Junie   [phone]    Samantha MARIEN-RN Care Coordinator  [facility]

## 2019-06-19 NOTE — TELEPHONE ENCOUNTER
My chart sent from patient:    Matthew Zaman,  Message from Dr. Chirinos:    No generic available for lyrica as far as I know. In this case I would actually recommend increasing your duloxetine instead. Go back up to 60mg daily for now. If no side effects, then up to 90mg daily.  Let us know if you have any questions     Peterson Chirinos, DO  [facility]

## 2019-06-19 NOTE — TELEPHONE ENCOUNTER
No generic available for lyrica as far as I know. In this case I would actually recommend increasing your duloxetine instead. Go back up to 60mg daily for now. If no side effects, then up to 90mg daily.    Peterson Chirinos, DO  [facility]

## 2019-06-20 NOTE — TELEPHONE ENCOUNTER
My chart sent to patient:    Junie,   Go back to taking your 60mg Cymbalta for a week, then increase to 90mg if you are tolerating it. Let us know how this goes.     Samantha Hsu   BSN-RN Care Coordinator  [facility]

## 2019-06-20 NOTE — TELEPHONE ENCOUNTER
My chart sent from patient:    Thanks for your response Dr Carl. Should I take the additional 20mg tablet of Cymbalta you prescribed for me at night for a week or so then graduate to the 60mg daily? I know I did not feel so well going from 60mg right to 30mg without a step in between, so I am not sure if going up in the dosage will have similar effects.     Samantha MARIEN-RN Care Coordinator  [facility]

## 2019-06-21 NOTE — TELEPHONE ENCOUNTER
My chart read by patient:    From  Samantha Hsu, RN To  Junie Lira Sent and Delivered  6/20/2019 11:22 AM   Last Read in MyChart  6/20/2019  5:33 PM by Maegan Hsu   BSN-RN Care Coordinator  [facility]

## 2019-07-11 ENCOUNTER — MYC REFILL (OUTPATIENT)
Dept: FAMILY MEDICINE | Facility: CLINIC | Age: 49
End: 2019-07-11

## 2019-07-11 DIAGNOSIS — F41.9 ANXIETY: ICD-10-CM

## 2019-07-11 PROBLEM — G56.03 BILATERAL CARPAL TUNNEL SYNDROME: Status: RESOLVED | Noted: 2018-10-04 | Resolved: 2019-07-11

## 2019-07-11 RX ORDER — HYDROXYZINE HYDROCHLORIDE 25 MG/1
TABLET, FILM COATED ORAL
Qty: 60 TABLET | Refills: 2 | Status: SHIPPED | OUTPATIENT
Start: 2019-07-11 | End: 2019-07-11

## 2019-07-11 RX ORDER — DULOXETIN HYDROCHLORIDE 30 MG/1
CAPSULE, DELAYED RELEASE ORAL
Qty: 90 CAPSULE | Refills: 1 | Status: SHIPPED | OUTPATIENT
Start: 2019-07-11 | End: 2019-07-23

## 2019-07-11 RX ORDER — HYDROXYZINE HYDROCHLORIDE 25 MG/1
TABLET, FILM COATED ORAL
Qty: 60 TABLET | Refills: 2 | Status: SHIPPED | OUTPATIENT
Start: 2019-07-11 | End: 2019-08-30

## 2019-07-11 NOTE — TELEPHONE ENCOUNTER
My chart sent from patient:    Good morning,   I have been taking 90mg of the cymbalta now as suggested and have not noticed any adverse side effects. Can you call in a prescription for this as I will run out next week.   Thank you,   Junie MARIEN-RN Care Coordinator  [facility]

## 2019-07-11 NOTE — TELEPHONE ENCOUNTER
Prescription approved per [facility] Refill Protocol.    Bradly JONES RN

## 2019-07-11 NOTE — TELEPHONE ENCOUNTER
Received MyChart request from patient requesting refill(s) for DULoxetine (CYMBALTA) 30 MG capsule    Please see patient's note about taking 90mg with no adverse side effects.    Pt last seen on 06/18/19  Next appt scheduled for 07/23/19    Will facilitate refill.

## 2019-07-11 NOTE — PROGRESS NOTES
Discharge Note -Hand Therapy    Initial Evaluation Date:  6/1/2017  Current Date: 7/11/2019  Number of Visits: 29    S:    Pt did not follow up for scheduled visits.    O:  Objective information is not available as pt has not returned for therapy.  Last visit or last objective information will serve as final entry.      A: Pt did not return for further treatment.    Status of goals is unknown due to lack of followup of patient.      P:  Discharge from Hand Therapy.

## 2019-07-11 NOTE — TELEPHONE ENCOUNTER
My chart sent to patient:    Matthew Zaman,  A prescription for your cymbalta has been sent to your pharmacy. Take care!    Samantha MARIEN-RN Care Coordinator  [facility]

## 2019-07-11 NOTE — TELEPHONE ENCOUNTER
"Last Written Prescription Date:  12/18/18  Last Fill Quantity: 60 tablet,  # refills: 2   Last office visit: 6/13/2019 with prescribing provider:  Nikki   Future Office Visit:   Next 5 appointments (look out 90 days)    Jul 23, 2019 11:00 AM CDT  Return Visit with Peterson [facility]irinos MD  [facility] [address] [id number] [phone]   Jul 23, 2019 11:30 AM CDT  [facility]  [facility] [address] [facility] [id number] [phone]         Requested Prescriptions   Pending Prescriptions Disp Refills     hydrOXYzine (ATARAX) 25 MG tablet 60 tablet 2     Sig: TAKE 1 TO 2 TABLETS(25 TO 50 MG) BY MOUTH EVERY 6 HOURS AS NEEDED FOR ANXIETY       Antihistamines Protocol Passed - 7/11/2019  8:58 AM        Passed - Recent (12 mo) or future (30 days) visit within the authorizing provider's specialty     Patient had office visit in the last 12 months or has a visit in the next 30 days with authorizing provider or within the authorizing provider's specialty.  See \"Patient Info\" tab in inbasket, or \"Choose Columns\" in Meds & Orders section of the refill encounter.              Passed - Patient is age 3 or older     Apply age and/or weight-based dosing for peds patients age 3 and older.    Forward request to provider for patients under the age of 3.          Passed - Medication is active on med list          "

## 2019-07-15 ENCOUNTER — MYC MEDICAL ADVICE (OUTPATIENT)
Dept: PALLIATIVE MEDICINE | Facility: CLINIC | Age: 49
End: 2019-07-15

## 2019-07-15 DIAGNOSIS — F41.9 ANXIETY: ICD-10-CM

## 2019-07-15 NOTE — TELEPHONE ENCOUNTER
Spoke with patient who stated that she was told she is supposed to be taking 90mg of DULoxetine (CYMBALTA) three times daily. But at the pharmacy they only have the 30mg of DULoxetine (CYMBALTA). Patient stated that the pharmacy said they sent a request for a 90 day supply for the 30mg because they do not have a higher strength of 90mg of DULoxetine (CYMBALTA).

## 2019-07-15 NOTE — TELEPHONE ENCOUNTER
My chart sent from patient:    Ajay Singh/Dr. Chirinos   I just went to [facility] to  my rx and the pharmacist said that they haven't received the prior authorization form they sent to your office on Thursday. She resent this form to you again and said insurance will not cover without it. Also she said this was not a 3 month rx, just a 1 month. Could you please see that they get the authorization form and if possible request a 3 month rx?   Thank you, Junie FISHMAN-RN Care Coordinator  [facility]

## 2019-07-17 RX ORDER — HYDROXYZINE HYDROCHLORIDE 25 MG/1
TABLET, FILM COATED ORAL
Refills: 0
Start: 2019-07-17

## 2019-07-17 NOTE — TELEPHONE ENCOUNTER
Please call patient and make sure she understands that she is supposed to be taking 90mg daily. Not three times daily.     The muscle relaxant, robaxin, is the only medication she has that I prescribe, that should be three times daily.    Peterson Chirinos, DO  [facility]

## 2019-07-17 NOTE — TELEPHONE ENCOUNTER
Central Prior Authorization Team   Phone: [phone]      PA Initiation    Medication: CYMBALTA-Initiated  Insurance Company: [employer] - Phone [phone] Fax [phone]  Pharmacy Filling the Rx: [email or web address] [id number] [place] [address] [address] AT [facility] [address]  Filling Pharmacy Phone: [phone]  Filling Pharmacy Fax:    Start Date: 7/17/2019

## 2019-07-17 NOTE — TELEPHONE ENCOUNTER
"hydroxyzine      Last Written Prescription Date:  7/11/19  Last Fill Quantity: 60,   # refills: 2  Last Office Visit: 6/13/19  Future Office visit:    Next 5 appointments (look out 90 days)    Jul 23, 2019 11:00 AM CDT  Return Visit with Peterson Chirinos MD  [facility] ([facility]) [address] [id number] [phone]   Jul 23, 2019 11:30 AM CDT  Office Visit with Bartolo Jordan MD  [facility] ([facility]) [address] [facility] [id number] [phone]           Requested Prescriptions   Pending Prescriptions Disp Refills     hydrOXYzine (ATARAX) 25 MG tablet [Pharmacy Med Name: hydrOXYzine HCL 25MG TAB] 60 tablet 0     Sig: TAKE ONE TO TWO TABLETS BY MOUTH EVERY 6 HOURS AS NEEDED FOR ANXIETY       Antihistamines Protocol Passed - 7/15/2019 10:20 AM        Passed - Recent (12 mo) or future (30 days) visit within the authorizing provider's specialty     Patient had office visit in the last 12 months or has a visit in the next 30 days with authorizing provider or within the authorizing provider's specialty.  See \"Patient Info\" tab in inbasket, or \"Choose Columns\" in Meds & Orders section of the refill encounter.              Passed - Patient is age 3 or older     Apply age and/or weight-based dosing for peds patients age 3 and older.    Forward request to provider for patients under the age of 3.          Passed - Medication is active on med list          "

## 2019-07-17 NOTE — TELEPHONE ENCOUNTER
Patient is taking 90mg of Cyambalta (3 pills) daily in the morning. She needs a refill and is wondering about a 3 month prescription. I think a PA is needed. I will send to PA team.     Samantha FISHMAN-RN Care Coordinator  [facility]

## 2019-07-18 NOTE — TELEPHONE ENCOUNTER
Prior Authorization Approval    Authorization Effective Date: 6/17/2019  Authorization Expiration Date: 7/16/2022  Medication: CYMBALTA-APPROVED  Approved Dose/Quantity:   Reference #:     Insurance Company: [employer] - Phone [phone] Fax [phone]  Expected CoPay:       CoPay Card Available:      Foundation Assistance Needed:    Which Pharmacy is filling the prescription (Not needed for infusion/clinic administered): [facility] [address] [address] [address] AT [facility] [address] [facility] [address]  Pharmacy Notified: Yes  Patient Notified: No    Pharmacy will notify patient when medication is ready.

## 2019-07-23 ENCOUNTER — OFFICE VISIT (OUTPATIENT)
Dept: PALLIATIVE MEDICINE | Facility: CLINIC | Age: 49
End: 2019-07-23
Payer: COMMERCIAL

## 2019-07-23 ENCOUNTER — OFFICE VISIT (OUTPATIENT)
Dept: FAMILY MEDICINE | Facility: CLINIC | Age: 49
End: 2019-07-23
Payer: COMMERCIAL

## 2019-07-23 VITALS
HEART RATE: 76 BPM | WEIGHT: 208.1 LBS | DIASTOLIC BLOOD PRESSURE: 86 MMHG | BODY MASS INDEX: 37.46 KG/M2 | SYSTOLIC BLOOD PRESSURE: 126 MMHG | OXYGEN SATURATION: 97 %

## 2019-07-23 VITALS
WEIGHT: 201 LBS | DIASTOLIC BLOOD PRESSURE: 86 MMHG | HEART RATE: 82 BPM | SYSTOLIC BLOOD PRESSURE: 128 MMHG | BODY MASS INDEX: 36.18 KG/M2 | TEMPERATURE: 98.9 F | OXYGEN SATURATION: 97 %

## 2019-07-23 DIAGNOSIS — G89.4 CHRONIC PAIN SYNDROME: ICD-10-CM

## 2019-07-23 DIAGNOSIS — F41.1 GAD (GENERALIZED ANXIETY DISORDER): ICD-10-CM

## 2019-07-23 DIAGNOSIS — G89.29 CHRONIC LEFT-SIDED LOW BACK PAIN WITHOUT SCIATICA: ICD-10-CM

## 2019-07-23 DIAGNOSIS — M79.7 FIBROMYALGIA: Primary | ICD-10-CM

## 2019-07-23 DIAGNOSIS — M54.50 CHRONIC LEFT-SIDED LOW BACK PAIN WITHOUT SCIATICA: ICD-10-CM

## 2019-07-23 DIAGNOSIS — F33.42 RECURRENT MAJOR DEPRESSIVE DISORDER, IN FULL REMISSION (H): ICD-10-CM

## 2019-07-23 PROCEDURE — 99214 OFFICE O/P EST MOD 30 MIN: CPT | Performed by: PHYSICAL MEDICINE & REHABILITATION

## 2019-07-23 PROCEDURE — 99213 OFFICE O/P EST LOW 20 MIN: CPT | Performed by: INTERNAL MEDICINE

## 2019-07-23 RX ORDER — PHENTERMINE HYDROCHLORIDE 15 MG/1
15 CAPSULE ORAL EVERY MORNING
Qty: 30 CAPSULE | Refills: 0 | Status: SHIPPED | OUTPATIENT
Start: 2019-07-23 | End: 2019-09-24

## 2019-07-23 RX ORDER — DULOXETIN HYDROCHLORIDE 30 MG/1
90 CAPSULE, DELAYED RELEASE ORAL DAILY
Qty: 270 CAPSULE | Refills: 3 | Status: SHIPPED | OUTPATIENT
Start: 2019-07-23 | End: 2019-09-12

## 2019-07-23 ASSESSMENT — PAIN SCALES - GENERAL: PAINLEVEL: MODERATE PAIN (5)

## 2019-07-23 NOTE — PROGRESS NOTES
[facility]    Date of visit: 7/23/2019    Chief complaint: No chief complaint on file.      Interval history:  Maegan Lira is a 48 year old female last seen by me on 6/18/19.      Recommendations/plan at the last visit included:  1. Physical Therapy: Continue doing exercises provided by current chiro/masseuse. If limited benefit, will consider referral to a formal PT program. Defer pain PT.  1. Clinical Health Pain Psychologist: Patient defers, feels that she is coping well.  2. Self Care Recommendations: Gentle progressive exercise that does not increase pain - gradually increase daily walking program.  Take mini breaks - 5 minutes of mindfullness a couple times a day.   1. Discussed starting 5 minutes walk daily with long term goal of 25 minutes  3. Diagnostic Studies: none  4. Medication Management:  1. Decrease duloxetine to 20mg for 2 weeks then stop. Can consider savella if limited benefit from lyrica. She doesn't feel duloxetine has provided benefit at current dose (30mg daily) or prior 60mg dose. If pain increases or mood worsens after decreasing dose to 20mg would recommend going back to 30mg dose and consider increasing to 60mg.  2. Start lyrica 25mg hs and increase to BID if no side effects. Can increase further if no side effects at the next visit.  3. Start methocarbamol 500mg and increase to TID prn if no side effects.   4. She uses tramadol very sparingly, last prescription lasted 1+ year. Ok to continue this as long as there are no red flags.   5. Further procedures recommended: consider sacroiliac joint injection  6. Follow up: 1 month in clinic      Since her last visit, Maegan Lira reports:  -She increased her duloxetine to 90mg daily and started a new diet. Both of these changes seem to have helped with her overall pain condition.   -Feels that she is more energetic, able to get more done around the house and has been going on longer  walks as well.  -Had issues with refilling her cymbalta, was expecting a 3 month refill but only got 1 months worth. Likes to have additional pills because of the traveling she does.  -Did have a flare in her low back/left buttock pain. This has improved after some chiropractic adjustments.  -She has been trying methocarbamol but this hasn't been very effective, no side effects however.      Pain scores:  Pain intensity on average is 5 on a scale of 0-10.     Pain Treatments:  1. Medications:       Current pain medications:  -Tizanidine 2mg bid prn  -Tramadol 50mg q6h prn  -Duloxetine 90mg daily  -Valium 5mg prn  -naproxen 375mg occasionally  -hydroxyzine 25mg once daily  -Butalbital 40mg prn       Previous pain medications:  -T3, Hydrodone, oxycodone, not helpful with reactions  -Dilaudid - h  -Sumatriptan - nh  -flexeril - h  -tizanidine - helpful, made her sleepy  -skelaxin - nh  -gabapentin - brain fog  -Tylenol - h  2. Physical Therapy: helpful, doing exercises provided by her chiropractor currently                TENS unit: nh  3. Pain psychology: helpful  4. Surgery: none  5. Injections: epidurals in neck and back with relief in the past  6. Alternative Therapies:               Chiropractic: helpful, last in 2019               Acupuncture: [place]       Side Effects: no side effect    Medications:  Current Outpatient Medications   Medication Sig Dispense Refill     Butalbital-APAP-Caffeine -40 MG CAPS Take one capsule as needed for migraine. Max one capsule daily. Do not take with opiate pain medications including oxycodone. 30 capsule 1     clobetasol (TEMOVATE) 0.05 % external cream Apply topically 2 times daily Do not use for more than 14 consecutive days 30 g 1     diazepam (VALIUM) 5 MG tablet Take 1 tablet (5 mg) by mouth daily as needed for muscle spasms If you take this medication, do not take any other muscle relaxants. 30 tablet 0     DULoxetine (CYMBALTA) 20 MG capsule Take 1 capsule (20 mg) by  mouth daily 14 capsule 0     DULoxetine (CYMBALTA) 30 MG capsule TAKE 3 CAPSULES BY MOUTH EVERY DAY 90 capsule 1     hydrOXYzine (ATARAX) 25 MG tablet TAKE 1 TO 2 TABLETS(25 TO 50 MG) BY MOUTH EVERY 6 HOURS AS NEEDED FOR ANXIETY 60 tablet 2     methocarbamol (ROBAXIN) 500 MG tablet Take 1 tablet (500 mg) by mouth 3 times daily as needed for muscle spasms 90 tablet 0     naproxen (NAPROSYN) 375 MG tablet Take 1 tablet (375 mg) by mouth 2 times daily as needed for moderate pain 60 tablet 3     omeprazole (PRILOSEC) 20 MG DR capsule TAKE 1 CAPSULE BY MOUTH DAILY AS NEEDED 90 capsule 1     ondansetron (ZOFRAN-ODT) 4 MG ODT tab Take 1 tablet (4 mg) by mouth daily as needed for nausea 30 tablet 3     pregabalin (LYRICA) 25 MG capsule Take 1 capsule (25 mg) by mouth 2 times daily 60 capsule 0     senna-docusate (KATIANA-COLACE) 8.6-50 MG per tablet Take 1-2 tablets by mouth 2 times daily as needed for constipation 90 tablet 3     traMADol (ULTRAM) 50 MG tablet Take 1 tablet (50 mg) by mouth every 6 hours as needed for severe pain 30 tablet 0     UNABLE TO FIND Take 10 drops by mouth 2 times daily MEDICATION NAME: CBD Max Hemp Oil         Medical History: any changes in medical history since they were last seen? No    Review of Systems:  The 14 system ROS was reviewed from the intake questionnaire, and is positive for: headache, abd pain, constipation, joint pain, stiffness, back/neck pain, paresthesias   Any bowel or bladder problems: frequency  Mood: stress    Physical Exam:  Blood pressure 126/86, pulse 76, weight 94.4 kg (208 lb 1.6 oz), SpO2 97 %, not currently breastfeeding.  General: NAD, pleasant  Gait: Normal  MSK exam: Lumbar ROM is wnl. Mild paraspinal tenderness in the lower spine. Mild PSIS/SIj tenderness. Left piriformis is the most tender region on exam today. Pain reproduced with FADIR in the left leg.        Assessment:  Ms. Lira is a 48 year old with past medical history including: Tarsal tunnel, bilateral  CTS, IBS, Sjogren's, depression, generalized anxiety who presents for evaluation and treatment of the following chronic pain conditions:    1. Fibromyalgia: She meets the 2010 ACR criteria for fibromyalgia. She has tried medications including gabapentin (foginess) and is currently on cymbalta 90mg. She has noticed improvement with increased cymbalta, dietary changes (including 10lb weight loss) and increased walking over the past 2 months.  She feels she is coping well and managing with her fibromyalgia symptoms at this time and defers referrals to pain PT and pain phd.    2. Left sacroiliac joint dysfunction: She has had left low back/buttock pain over the past 9 months which at this point is only intermittently flaring. She has been doing exercises provided by her masseuse and chiro with some benefit. Does have findings suggestive of sacroiliac joint dysfunction and pirifoirmis dysfunction on exam including psis/sij tenderness, positive baldev and fadir.    Mental Health - the patient's mental health concerns, specifically anxiety/depression, affect her experience of pain and contribute to her clinically significant distress.        Plan:  The following recommendations were given to the patient. Diagnosis, treatment options, risks, benefits, and alternatives were discussed, and all questions were answered. The patient expressed understanding of the plan for management.     I am recommending a multidisciplinary treatment plan to help this patient better manage her pain.  This includes:     1. Physical Therapy: Continue doing exercises provided by current chiro/masseuse.   2. Clinical Health Pain Psychologist: Patient defers, feels that she is coping well.  3. Self Care Recommendations: Gentle progressive exercise that does not increase pain - gradually increase daily walking program.  Take mini breaks - 5 minutes of mindfullness a couple times a day.   1. Walking about 15 minute stretches at this time, this was  limited to about 5 minutes before. Encouraged her to continue adding to this.  4. Diagnostic Studies: none  5. Medication Management:  1. Continue duloxetine 90mg daily  2.  Increase methocarbamol to 750mg (1.5 tablets), if no side effects and still limited benefit then 2 tablets (1000mg).  3. She uses tramadol very sparingly, last prescription lasted 1+ year. Ok to continue this as long as there are no red flags.   6. Further procedures recommended: consider sacroiliac joint injection or piriformis injection  7. Follow up: 2 months in clinic      I spent 30 minutes of time face to face with the patient.  Greater than 50% of this time was spent in patient counseling and/or coordination of care.      Peterson Chirinos DO  [facility]  7/23/2019

## 2019-07-23 NOTE — PROGRESS NOTES
Subjective     Maegan Lira is a 48 year old female who presents to clinic today for the following health issues:      I last saw the patient at the clinic on 6/13/2019 for weight management.  Advised patient to start a low calorie meal plan.    Since her last clinic visit, she has lost 6 pounds; bringing her total weight loss to 6 pounds since we started her weight management on 6/13/2019.  Her BMI has decreased from 37 to 36.    Patient is pleasantly surprised on how much overall physical improvement she has been experiencing --she experiences less body aches and joint pains, she feels that she has more energy.    She manages to maintain her low calorie meal plan even without an appetite suppressant.  She is good in getting her protein drinks in.    Patient's blood pressure has also significantly improved from 145/100 on her previous visit to 126/86 today.      **patient denies history of cardiovascular disease (arrhythmias, heart failure, coronary artery disease, stroke, uncontrolled hypertension); hyperthyroidism; glaucoma; severe anxiety or agitated states; history of drug abuse      Past Medical History:   Diagnosis Date     Colon polyps     colonoscopy x 2  last year clear      Fibromyalgia      Herniated lumbar disc without myelopathy     MRI X 3 / hospitalized x 2 /pain management      Low back pain      Migraine     since age 18     Recurrent sinus infections        Review of Systems   Constitutional: Negative for fatigue.   Eyes: Negative for pain.   Cardiovascular: Negative for chest pain and palpitations.   Gastrointestinal: Negative for abdominal pain, constipation, diarrhea, nausea and vomiting.   Neurological: Negative for weakness, light-headedness and headaches.   Psychiatric/Behavioral: The patient is not nervous/anxious.        /86 (BP Location: Left arm, Cuff Size: Adult Regular)   Pulse 82   Temp 98.9  F (37.2  C) (Oral)   Wt 91.2 kg (201 lb)   SpO2 97%   BMI 36.18 kg/m         Physical Exam   Constitutional: She is oriented to person, place, and time. No distress.   Neck: No thyromegaly present.   Cardiovascular: Normal rate, regular rhythm and normal heart sounds.   Pulmonary/Chest: Effort normal and breath sounds normal. No respiratory distress.   Abdominal: Soft. There is no tenderness.   Neurological: She is alert and oriented to person, place, and time. Coordination normal.   No tremors   Vitals reviewed.        ICD-10-CM    1. BMI 36.0-36.9,adult Z68.36 phentermine (ADIPEX-P) 15 MG capsule       Patient Instructions   May try Phentermine.  Call doctor if you develop any side effects from the medication.    Strictly count/monitor your calories with every meal/snack every day.    Maintain your low-calorie meal plan.    Monitor your weight once a week and set a weight-loss goal of at least 1-2 pounds per week.    Follow up in 1 month.

## 2019-07-23 NOTE — PATIENT INSTRUCTIONS
1. Increase methocarbamol to 750mg (1.5 tablets), if no side effects and still limited benefit then 2 tablets (1000mg).  2. Continue other medications at current doses.  3. If your left buttock/low back pain flares and isn't responding to exercises, call the number below and i'll order an injection.  4. Follow up in clinic in 2 months.    ----------------------------------------------------------------  Clinic Number:  [phone]     Call with any questions about your care and for scheduling assistance.     Calls are returned Monday through Friday between 8 AM and 4:30 PM. We usually get back to you within 2 business days depending on the issue/request.    If we are prescribing your medications:    For opioid medication refills, call the clinic or send a Explain My Surgery message 7 days in advance.  Please include:    Name of requested medication    Name of the pharmacy.    For non-opioid medications, call your pharmacy directly to request a refill. Please allow 3-4 days to be processed.     Per [place] State Law:    All controlled substance prescriptions must be filled within 30 days of being written.      For those controlled substances allowing refills, pickup must occur within 30 days of last fill.      We believe regular attendance is key to your success in our program!      Any time you are unable to keep your appointment we ask that you call us at least 24 hours in advance to cancel.This will allow us to offer the appointment time to another patient.     Multiple missed appointments may lead to dismissal from the clinic.

## 2019-07-23 NOTE — PATIENT INSTRUCTIONS
May try Phentermine.  Call doctor if you develop any side effects from the medication.    Strictly count/monitor your calories with every meal/snack every day.    Maintain your low-calorie meal plan.    Monitor your weight once a week and set a weight-loss goal of at least 1-2 pounds per week.    Follow up in 1 month.

## 2019-07-30 ASSESSMENT — ENCOUNTER SYMPTOMS
EYE PAIN: 0
CONSTIPATION: 0
HEADACHES: 0
DIARRHEA: 0
ABDOMINAL PAIN: 0
PALPITATIONS: 0
FATIGUE: 0
NERVOUS/ANXIOUS: 0
VOMITING: 0
NAUSEA: 0
LIGHT-HEADEDNESS: 0
WEAKNESS: 0

## 2019-08-27 ENCOUNTER — OFFICE VISIT (OUTPATIENT)
Dept: FAMILY MEDICINE | Facility: CLINIC | Age: 49
End: 2019-08-27
Payer: COMMERCIAL

## 2019-08-27 VITALS
SYSTOLIC BLOOD PRESSURE: 139 MMHG | HEIGHT: 63 IN | WEIGHT: 195 LBS | TEMPERATURE: 97 F | BODY MASS INDEX: 34.55 KG/M2 | DIASTOLIC BLOOD PRESSURE: 89 MMHG | HEART RATE: 88 BPM | OXYGEN SATURATION: 100 %

## 2019-08-27 DIAGNOSIS — K59.00 CONSTIPATION, UNSPECIFIED CONSTIPATION TYPE: ICD-10-CM

## 2019-08-27 DIAGNOSIS — E66.812 CLASS 2 OBESITY WITHOUT SERIOUS COMORBIDITY WITH BODY MASS INDEX (BMI) OF 35.0 TO 35.9 IN ADULT, UNSPECIFIED OBESITY TYPE: Primary | ICD-10-CM

## 2019-08-27 PROCEDURE — 99214 OFFICE O/P EST MOD 30 MIN: CPT | Performed by: INTERNAL MEDICINE

## 2019-08-27 ASSESSMENT — ENCOUNTER SYMPTOMS
WEAKNESS: 0
DIARRHEA: 0
VOMITING: 0
HEADACHES: 0
PALPITATIONS: 0
NAUSEA: 0
ABDOMINAL PAIN: 0
SHORTNESS OF BREATH: 0
CONSTIPATION: 1
LIGHT-HEADEDNESS: 0
MYALGIAS: 0
FATIGUE: 0

## 2019-08-27 ASSESSMENT — MIFFLIN-ST. JEOR: SCORE: 1465.7

## 2019-08-27 NOTE — PATIENT INSTRUCTIONS
Take Phentermine daily.  Call doctor if you develop any side effects from the medication.    Strictly count/monitor your calories with every meal/snack every day.    Maintain your low-calorie meal plan.    Monitor your weight once a week and set a weight-loss goal of at least 1-2 pounds per week.    Try Miralax daily to help with your constipation.    Follow up in 1 month.

## 2019-08-27 NOTE — PROGRESS NOTES
"Subjective     Maegan Lira is a 48 year old female who presents to clinic today for the following health issues:      I last saw the patient at the clinic on 7/23/2019 for weight management.  Advised patient to continue her low calorie meal plan and I gave her a prescription for phentermine 15 mg daily.     Since her last clinic visit, she has lost 6 pounds; bringing her total weight loss to 12 pounds since we started her weight management on 6/13/2019.  Her BMI has decreased from 37 to 35.    She just started taking the phentermine today and has tolerated it so far.    She has been experiencing constipation since increasing the protein (through the protein shakes as part of her meal plan) in her diet.      Past Medical History:   Diagnosis Date     Colon polyps     colonoscopy x 2  last year clear      Fibromyalgia      Herniated lumbar disc without myelopathy     MRI X 3 / hospitalized x 2 /pain management      Low back pain      Migraine     since age 18     Recurrent sinus infections        Review of Systems   Constitutional: Negative for fatigue.   Eyes: Negative for visual disturbance.   Respiratory: Negative for shortness of breath.    Cardiovascular: Negative for chest pain, palpitations and leg swelling.   Gastrointestinal: Positive for constipation. Negative for abdominal pain, diarrhea, nausea and vomiting.   Musculoskeletal: Negative for myalgias.   Neurological: Negative for weakness, light-headedness and headaches.       /89   Pulse 88   Temp 97  F (36.1  C) (Oral)   Ht 1.588 m (5' 2.5\")   Wt 88.5 kg (195 lb)   SpO2 100%   Breastfeeding? No   BMI 35.10 kg/m        Physical Exam   Constitutional: She is oriented to person, place, and time. No distress.   Neck: No thyromegaly present.   Cardiovascular: Normal rate, regular rhythm and normal heart sounds.   Pulmonary/Chest: Effort normal and breath sounds normal. No respiratory distress.   Abdominal: Soft. There is no tenderness. "   Neurological: She is alert and oriented to person, place, and time. Coordination normal.   No tremors   Vitals reviewed.        ICD-10-CM    1. Class 2 obesity without serious comorbidity with body mass index (BMI) of 35.0 to 35.9 in adult, unspecified obesity type E66.9     Z68.35    2. Constipation, unspecified constipation type K59.00        Patient Instructions   Take Phentermine daily.  Call doctor if you develop any side effects from the medication.    Strictly count/monitor your calories with every meal/snack every day.    Maintain your low-calorie meal plan.    Monitor your weight once a week and set a weight-loss goal of at least 1-2 pounds per week.    Try Miralax daily to help with your constipation.    Follow up in 1 month.

## 2019-08-29 DIAGNOSIS — F41.9 ANXIETY: ICD-10-CM

## 2019-08-29 NOTE — TELEPHONE ENCOUNTER
"Switching pharmacies     Last Written Prescription Date:  7/11/19  Last Fill Quantity: 60 tablet,  # refills: 2   Last office visit: 8/27/2019 with prescribing provider:  Nikki   Future Office Visit:   Next 5 appointments (look out 90 days)    Sep 24, 2019 11:00 AM CDT  Return Visit with Peterson Chirinos MD  [facility] ([facility]) [address] [address]  [phone]   Sep 24, 2019 11:30 AM CDT  [facility] Visit with Bartolo [facility]  [facility] [address] [facility] [address] [phone]         Requested Prescriptions   Pending Prescriptions Disp Refills     hydrOXYzine (ATARAX) 25 MG tablet 60 tablet 2     Sig: TAKE 1 TO 2 TABLETS(25 TO 50 MG) BY MOUTH EVERY 6 HOURS AS NEEDED FOR ANXIETY       Antihistamines Protocol Passed - 8/29/2019 10:38 AM        Passed - Recent (12 mo) or future (30 days) visit within the authorizing provider's specialty     Patient had office visit in the last 12 months or has a visit in the next 30 days with authorizing provider or within the authorizing provider's specialty.  See \"Patient Info\" tab in inbasket, or \"Choose Columns\" in Meds & Orders section of the refill encounter.              Passed - Patient is age 3 or older     Apply age and/or weight-based dosing for peds patients age 3 and older.    Forward request to provider for patients under the age of 3.          Passed - Medication is active on med list          "

## 2019-08-30 RX ORDER — HYDROXYZINE HYDROCHLORIDE 25 MG/1
TABLET, FILM COATED ORAL
Qty: 60 TABLET | Refills: 1 | Status: SHIPPED | OUTPATIENT
Start: 2019-08-30 | End: 2019-09-11

## 2019-08-30 NOTE — TELEPHONE ENCOUNTER
Routing refill request to provider for review/approval because:  DX'd for Anxiety    Please review and authorize if appropriate,     Thank you,   Geetha TREVINO RN

## 2019-09-10 DIAGNOSIS — F41.9 ANXIETY: ICD-10-CM

## 2019-09-11 RX ORDER — HYDROXYZINE HYDROCHLORIDE 25 MG/1
TABLET, FILM COATED ORAL
Qty: 60 TABLET | Refills: 1 | Status: SHIPPED | OUTPATIENT
Start: 2019-09-11 | End: 2020-03-22

## 2019-09-11 NOTE — TELEPHONE ENCOUNTER
"Prescription approved per [facility].  Farzana JOHNSON RN    Last Written Prescription Date:  8/30/2019  Last Fill Quantity: 60,  # refills: 1   Last office visit: 8/27/2019 with prescribing provider:     Future Office Visit:   Next 5 appointments (look out 90 days)    Sep 24, 2019 11:00 AM CDT  Return Visit with Peterson Chirinos MD  [facility] ([facility]) [address] [facility] [address]  [facility] [address]  [phone]   Sep 24, 2019 11:30 AM CDT  Office Visit with Bartolo Jordan MD  [facility] ([facility]) [address] [facility] [address] [phone]         Requested Prescriptions   Pending Prescriptions Disp Refills     hydrOXYzine (ATARAX) 25 MG tablet 60 tablet 1     Sig: TAKE 1 TO 2 TABLETS(25 TO 50 MG) BY MOUTH EVERY 6 HOURS AS NEEDED FOR ANXIETY       Antihistamines Protocol Passed - 9/10/2019  7:28 PM        Passed - Recent (12 mo) or future (30 days) visit within the authorizing provider's specialty     Patient had office visit in the last 12 months or has a visit in the next 30 days with authorizing provider or within the authorizing provider's specialty.  See \"Patient Info\" tab in inbasket, or \"Choose Columns\" in Meds & Orders section of the refill encounter.              Passed - Patient is age 3 or older     Apply age and/or weight-based dosing for peds patients age 3 and older.    Forward request to provider for patients under the age of 3.          Passed - Medication is active on med list        "

## 2019-09-12 ENCOUNTER — MYC REFILL (OUTPATIENT)
Dept: PALLIATIVE MEDICINE | Facility: CLINIC | Age: 49
End: 2019-09-12

## 2019-09-12 DIAGNOSIS — M79.7 FIBROMYALGIA: ICD-10-CM

## 2019-09-12 RX ORDER — DULOXETIN HYDROCHLORIDE 30 MG/1
90 CAPSULE, DELAYED RELEASE ORAL DAILY
Qty: 270 CAPSULE | Refills: 3 | Status: CANCELLED | OUTPATIENT
Start: 2019-09-12

## 2019-09-12 RX ORDER — DULOXETIN HYDROCHLORIDE 30 MG/1
90 CAPSULE, DELAYED RELEASE ORAL DAILY
Qty: 270 CAPSULE | Refills: 3 | Status: SHIPPED | OUTPATIENT
Start: 2019-09-12 | End: 2020-08-17

## 2019-09-12 NOTE — TELEPHONE ENCOUNTER
Received request from patient requesting refill(s) for DULoxetine (CYMBALTA) 30 MG capsule      Pt last seen on 07/23/19  Next appt scheduled for 09/24/19    Will facilitate refill.

## 2019-09-23 NOTE — PROGRESS NOTES
"[facility]    Date of visit: 9/24/2019    Chief complaint:   Chief Complaint   Patient presents with     Pain       Interval history:  Maegan Lira is a 48 year old female last seen by me on 7/23/19.      Recommendations/plan at the last visit included:  1. Physical Therapy: Continue doing exercises provided by current chiro/masseuse.   2. Clinical Health Pain Psychologist: Patient defers, feels that she is coping well.  3. Self Care Recommendations: Gentle progressive exercise that does not increase pain - gradually increase daily walking program.  Take mini breaks - 5 minutes of mindfullness a couple times a day.   1. Walking about 15 minute stretches at this time, this was limited to about 5 minutes before. Encouraged her to continue adding to this.  4. Diagnostic Studies: none  5. Medication Management:  1. Continue duloxetine 90mg daily  2.  Increase methocarbamol to 750mg (1.5 tablets), if no side effects and still limited benefit then 2 tablets (1000mg).  3. She uses tramadol very sparingly, last prescription lasted 1+ year. Ok to continue this as long as there are no red flags.   6. Further procedures recommended: consider sacroiliac joint injection or piriformis injection  7. Follow up: 2 months in clinic    Since her last visit, Maegan Lira reports:  -Feels that she may have \"Fractured\" her foot about 6 weeks ago. She noticed a swollen/blue foot suddenly one day. Has fractured this foot in the past. Has been using a walking boot, icing/elevating and the symptoms have improved.  -Continues taking duloxetine 90mg daily and has continued a calorie restricted diet. Was able to stop her heart burn medication, feels her inflammation is better.   -has more energy, chronic fatigue isn't as much of an issue as it had int he past.  -Does exercises that she learned in pool therapy in the [place].  -Had seen rhuematology in the past and was diagnosed with " "\"early sjogren's\", she hasn't had much follow up since that time. Continues having dry eyes, dry mouth symptoms.    Pain scores:  Pain intensity on average is 5 on a scale of 0-10.     Pain Treatments:  1. Medications:       Current pain medications:  -Tizanidine 2mg - helpful  -Tramadol 50mg q6h prn, uses this once weekly on average  -Duloxetine 90mg daily  -Valium 5mg prn  -naproxen 375mg occasionally  -hydroxyzine 25mg once daily  -Butalbital 40mg prn       Previous pain medications:  -T3, Hydrodone, oxycodone, not helpful with reactions   -Methocarbamol 1000mg daily - not sure if this is helping  -Dilaudid - h  -Sumatriptan - nh  -flexeril - h  -tizanidine - helpful, made her sleepy  -skelaxin - nh  -gabapentin - brain fog  -Tylenol - h  2. Physical Therapy: helpful, doing exercises provided by her chiropractor currently                TENS unit: nh  3. Pain psychology: helpful  4. Surgery: none  5. Injections: epidurals in neck and back with relief in the past  6. Alternative Therapies:               Chiropractic: helpful, last in 2019               Acupuncture: [facility]       Side Effects: no side effect    Medications:  Current Outpatient Medications   Medication Sig Dispense Refill     Butalbital-APAP-Caffeine -40 MG CAPS Take one capsule as needed for migraine. Max one capsule daily. Do not take with opiate pain medications including oxycodone. 30 capsule 1     clobetasol (TEMOVATE) 0.05 % external cream Apply topically 2 times daily Do not use for more than 14 consecutive days 30 g 1     diazepam (VALIUM) 5 MG tablet Take 1 tablet (5 mg) by mouth daily as needed for muscle spasms If you take this medication, do not take any other muscle relaxants. 30 tablet 0     DULoxetine (CYMBALTA) 30 MG capsule Take 3 capsules (90 mg) by mouth daily 270 capsule 3     hydrOXYzine (ATARAX) 25 MG tablet TAKE 1 TO 2 TABLETS(25 TO 50 MG) BY MOUTH EVERY 6 HOURS AS NEEDED FOR ANXIETY 60 tablet 1     methocarbamol (ROBAXIN) " 500 MG tablet Take 1 tablet (500 mg) by mouth 3 times daily as needed for muscle spasms 90 tablet 0     naproxen (NAPROSYN) 375 MG tablet Take 1 tablet (375 mg) by mouth 2 times daily as needed for moderate pain 60 tablet 3     omeprazole (PRILOSEC) 20 MG DR capsule TAKE 1 CAPSULE BY MOUTH DAILY AS NEEDED 90 capsule 1     ondansetron (ZOFRAN-ODT) 4 MG ODT tab Take 1 tablet (4 mg) by mouth daily as needed for nausea 30 tablet 3     phentermine (ADIPEX-P) 15 MG capsule Take 1 capsule (15 mg) by mouth every morning 30 capsule 0     senna-docusate (KATIANA-COLACE) 8.6-50 MG per tablet Take 1-2 tablets by mouth 2 times daily as needed for constipation 90 tablet 3     traMADol (ULTRAM) 50 MG tablet Take 1 tablet (50 mg) by mouth every 6 hours as needed for severe pain 30 tablet 0     UNABLE TO FIND Take 10 drops by mouth 2 times daily MEDICATION NAME: CBD Max Hemp Oil         Medical History: any changes in medical history since they were last seen? No    Review of Systems:  The 14 system ROS was reviewed from the intake questionnaire, and is positive for: fatigue, weight losss, headache, itching, constipation, joint pain, stiffness, back pain neck pain  Any bowel or bladder problems: denies  Mood: denies    Physical Exam:  Blood pressure 132/89, pulse 89, weight 85.8 kg (189 lb 1.6 oz), SpO2 98 %, not currently breastfeeding.  General: NAD, pleasant  Gait: Normal          Assessment:  Ms. Lira is a 48 year old with past medical history including: Tarsal tunnel, bilateral CTS, IBS, Sjogren's, depression, generalized anxiety who presents for evaluation and treatment of the following chronic pain conditions:    1. Fibromyalgia: She meets the 2010 ACR criteria for fibromyalgia. She has tried medications including gabapentin (foginess) and is currently on cymbalta 90mg. She has noticed improvement with increased cymbalta, dietary changes (including 10lb weight loss) and increased walking and activity.  She feels she is coping  well and managing with her fibromyalgia symptoms at this time and defers referrals to pain PT and pain phd.    2. Left sacroiliac joint dysfunction: She has had left low back/buttock pain over the past year intermittently. She has been doing exercises provided by her masseuse and chiro with some benefit.     Mental Health - the patient's mental health concerns, specifically anxiety/depression, affect her experience of pain and contribute to her clinically significant distress.        Plan:  The following recommendations were given to the patient. Diagnosis, treatment options, risks, benefits, and alternatives were discussed, and all questions were answered. The patient expressed understanding of the plan for management.     I am recommending a multidisciplinary treatment plan to help this patient better manage her pain.  This includes:     1. Physical Therapy: Continue doing exercises provided by current chiro/masseuse.   2. Clinical Health Pain Psychologist: Patient defers, feels that she is coping well.  3. Self Care Recommendations: Gentle progressive exercise that does not increase pain - gradually increase daily walking program.   1. Continue dietary changes previously instituted  2. Continue current walking regimen  4. Diagnostic Studies: none  5. Medication Management:  1. Continue duloxetine 90mg daily  2. Stop methocarbamol  3. Continue tizanidine 2mg prn  4. Ok to continue tramadol 50mg as she uses this very sparingly. Ok to continue this as long as there are no red flags.   6. Further procedures recommended: consider sacroiliac joint injection or piriformis injection if low back/buttock pain flares in the future  7. Encouraged her to f/u with rheumatology regarding sjogren's diagnosis.  8. Follow up: 2-3 months in clinic      I spent 30 minutes of time face to face with the patient.  Greater than 50% of this time was spent in patient counseling and/or coordination of care.      Peterson Chirinos,  DO  [facility]  9/24/2019

## 2019-09-24 ENCOUNTER — OFFICE VISIT (OUTPATIENT)
Dept: FAMILY MEDICINE | Facility: CLINIC | Age: 49
End: 2019-09-24
Payer: COMMERCIAL

## 2019-09-24 ENCOUNTER — OFFICE VISIT (OUTPATIENT)
Dept: PALLIATIVE MEDICINE | Facility: CLINIC | Age: 49
End: 2019-09-24
Payer: COMMERCIAL

## 2019-09-24 VITALS
OXYGEN SATURATION: 98 % | BODY MASS INDEX: 34.04 KG/M2 | HEART RATE: 89 BPM | DIASTOLIC BLOOD PRESSURE: 89 MMHG | SYSTOLIC BLOOD PRESSURE: 132 MMHG | WEIGHT: 189.1 LBS

## 2019-09-24 VITALS
SYSTOLIC BLOOD PRESSURE: 130 MMHG | BODY MASS INDEX: 33.56 KG/M2 | TEMPERATURE: 99 F | DIASTOLIC BLOOD PRESSURE: 88 MMHG | HEART RATE: 85 BPM | HEIGHT: 63 IN | OXYGEN SATURATION: 98 % | WEIGHT: 189.4 LBS

## 2019-09-24 DIAGNOSIS — M79.7 FIBROMYALGIA: ICD-10-CM

## 2019-09-24 DIAGNOSIS — M35.01 SJOGREN'S SYNDROME WITH KERATOCONJUNCTIVITIS SICCA (H): ICD-10-CM

## 2019-09-24 DIAGNOSIS — Z23 NEED FOR PROPHYLACTIC VACCINATION AND INOCULATION AGAINST INFLUENZA: ICD-10-CM

## 2019-09-24 DIAGNOSIS — M54.5 CHRONIC BILATERAL LOW BACK PAIN, WITH SCIATICA PRESENCE UNSPECIFIED: Primary | ICD-10-CM

## 2019-09-24 DIAGNOSIS — E66.811 CLASS 1 OBESITY WITH SERIOUS COMORBIDITY AND BODY MASS INDEX (BMI) OF 34.0 TO 34.9 IN ADULT, UNSPECIFIED OBESITY TYPE: Primary | ICD-10-CM

## 2019-09-24 DIAGNOSIS — G89.29 CHRONIC BILATERAL LOW BACK PAIN, WITH SCIATICA PRESENCE UNSPECIFIED: Primary | ICD-10-CM

## 2019-09-24 DIAGNOSIS — Z23 NEED FOR TETANUS BOOSTER: ICD-10-CM

## 2019-09-24 PROCEDURE — 99213 OFFICE O/P EST LOW 20 MIN: CPT | Performed by: PHYSICAL MEDICINE & REHABILITATION

## 2019-09-24 PROCEDURE — 90472 IMMUNIZATION ADMIN EACH ADD: CPT | Performed by: INTERNAL MEDICINE

## 2019-09-24 PROCEDURE — 90471 IMMUNIZATION ADMIN: CPT | Performed by: INTERNAL MEDICINE

## 2019-09-24 PROCEDURE — 90686 IIV4 VACC NO PRSV 0.5 ML IM: CPT | Performed by: INTERNAL MEDICINE

## 2019-09-24 PROCEDURE — 90715 TDAP VACCINE 7 YRS/> IM: CPT | Performed by: INTERNAL MEDICINE

## 2019-09-24 PROCEDURE — 99214 OFFICE O/P EST MOD 30 MIN: CPT | Mod: 25 | Performed by: INTERNAL MEDICINE

## 2019-09-24 RX ORDER — PHENTERMINE HYDROCHLORIDE 15 MG/1
15 CAPSULE ORAL EVERY MORNING
Qty: 30 CAPSULE | Refills: 1 | Status: SHIPPED | OUTPATIENT
Start: 2019-09-24 | End: 2019-12-04

## 2019-09-24 RX ORDER — TRAMADOL HYDROCHLORIDE 50 MG/1
50 TABLET ORAL EVERY 6 HOURS PRN
Qty: 30 TABLET | Refills: 0 | Status: SHIPPED | OUTPATIENT
Start: 2019-09-24 | End: 2020-07-09

## 2019-09-24 RX ORDER — PHENTERMINE HYDROCHLORIDE 15 MG/1
15 CAPSULE ORAL EVERY MORNING
Qty: 30 CAPSULE | Refills: 1 | Status: SHIPPED | OUTPATIENT
Start: 2019-09-24 | End: 2019-09-24

## 2019-09-24 ASSESSMENT — PAIN SCALES - GENERAL: PAINLEVEL: MODERATE PAIN (4)

## 2019-09-24 ASSESSMENT — MIFFLIN-ST. JEOR: SCORE: 1440.3

## 2019-09-24 NOTE — PATIENT INSTRUCTIONS
Strictly count/monitor your calories with every meal/snack every day.    Maintain your low-calorie meal plan.    Monitor your weight once a week and set a weight-loss goal of at least 1-2 pounds per week.    Follow up in 2 months.

## 2019-09-24 NOTE — PROGRESS NOTES
"Subjective     Maegan Lira is a 48 year old female who presents to clinic today for the following health issues:    HPI     I last saw the patient at the clinic on 8/27/2019 for weight management.  Advised patient to continue her low calorie meal plan and her phentermine 15 mg daily which she has tolerated well.     Since her last clinic visit, she has lost 6 pounds; bringing her total weight loss to 18 pounds since we started her weight management on 6/13/2019.  Her BMI has decreased from 37 to 34.    She is pleased with her continued weight loss and states that the current dose of phentermine works well to suppress her appetite.    She has a history of Sjogren's and would like to reestablish with a rheumatologist.  She does complain of dry eyes but denies eye pain or changes in vision.    Tramadol continues to work well for relieving her fibromyalgia pain.  She takes the medication judiciously.      Past Medical History:   Diagnosis Date     Colon polyps     colonoscopy x 2  last year clear      Fibromyalgia      Herniated lumbar disc without myelopathy     MRI X 3 / hospitalized x 2 /pain management      Low back pain      Migraine     since age 18     Recurrent sinus infections        Review of Systems   Constitutional: Negative for fatigue.   Eyes: Negative for visual disturbance.   Respiratory: Negative for shortness of breath.    Cardiovascular: Negative for chest pain, palpitations and leg swelling.   Gastrointestinal: Negative for abdominal pain, constipation, diarrhea, nausea and vomiting.   Musculoskeletal: Positive for myalgias.   Neurological: Negative for weakness, light-headedness and headaches.       /88 (BP Location: Left arm, Patient Position: Sitting, Cuff Size: Adult Regular)   Pulse 85   Temp 99  F (37.2  C) (Tympanic)   Ht 1.588 m (5' 2.5\")   Wt 85.9 kg (189 lb 6.4 oz)   SpO2 98%   BMI 34.09 kg/m      Physical Exam  Vitals signs reviewed.   Constitutional:       General: She " is not in acute distress.  Neck:      Thyroid: No thyromegaly.   Cardiovascular:      Rate and Rhythm: Normal rate and regular rhythm.      Heart sounds: Normal heart sounds.   Pulmonary:      Effort: Pulmonary effort is normal. No respiratory distress.   Neurological:      Mental Status: She is alert and oriented to person, place, and time.      Coordination: Coordination normal.      Comments: No tremors   Psychiatric:         Mood and Affect: Mood normal.         Behavior: Behavior normal.         ICD-10-CM    1. Class 1 obesity with serious comorbidity and body mass index (BMI) of 34.0 to 34.9 in adult, unspecified obesity type E66.9 phentermine (ADIPEX-P) 15 MG capsule    Z68.34    2. Sjogren's syndrome with keratoconjunctivitis sicca (H) M35.01 RHEUMATOLOGY REFERRAL   3. Fibromyalgia M79.7 traMADol (ULTRAM) 50 MG tablet   4. Need for prophylactic vaccination and inoculation against influenza Z23 Vaccine Administration, Initial [73153]     INFLUENZA VACCINE IM > 6 MONTHS VALENT IIV4 [52681]   5. Need for tetanus booster Z23 TDAP VACCINE (ADACEL) [33248.002]     1st  Administration  [20334]       Patient Instructions   Strictly count/monitor your calories with every meal/snack every day.    Maintain your low-calorie meal plan.    Monitor your weight once a week and set a weight-loss goal of at least 1-2 pounds per week.    Follow up in 2 months.

## 2019-09-24 NOTE — PATIENT INSTRUCTIONS
----------------------------------------------------------------  [facility] Number:  [phone]     Call with any questions about your care and for scheduling assistance.     Calls are returned Monday through Friday between 8 AM and 4:30 PM. We usually get back to you within 2 business days depending on the issue/request.    If we are prescribing your medications:    For opioid medication refills, call the clinic or send a [employer] message 7 days in advance.  Please include:    Name of requested medication    Name of the pharmacy.    For non-opioid medications, call your pharmacy directly to request a refill. Please allow 3-4 days to be processed.     Per [place] State Law:    All controlled substance prescriptions must be filled within 30 days of being written.      For those controlled substances allowing refills, pickup must occur within 30 days of last fill.      We believe regular attendance is key to your success in our program!      Any time you are unable to keep your appointment we ask that you call us at least 24 hours in advance to cancel.This will allow us to offer the appointment time to another patient.     Multiple missed appointments may lead to dismissal from the clinic.

## 2019-09-30 ASSESSMENT — ENCOUNTER SYMPTOMS
LIGHT-HEADEDNESS: 0
MYALGIAS: 1
SHORTNESS OF BREATH: 0
HEADACHES: 0
PALPITATIONS: 0
NAUSEA: 0
VOMITING: 0
FATIGUE: 0
WEAKNESS: 0
DIARRHEA: 0
CONSTIPATION: 0
ABDOMINAL PAIN: 0

## 2019-10-03 ENCOUNTER — MYC MEDICAL ADVICE (OUTPATIENT)
Dept: FAMILY MEDICINE | Facility: CLINIC | Age: 49
End: 2019-10-03

## 2019-11-25 ENCOUNTER — TELEPHONE (OUTPATIENT)
Dept: FAMILY MEDICINE | Facility: CLINIC | Age: 49
End: 2019-11-25

## 2019-11-25 NOTE — TELEPHONE ENCOUNTER
I received a request for a renewal of the patient's letter stating that she needs to travel with her dogs for emotional support due to her anxiety (refer to 11/20/2018 letter in patient's record).      Please inform the patient that the current directive from [employer] with regard to these requests specify that such letters only be issued to patient's with physical disabilities (ex. blind); as such, I cannot renew the letter as [employer]'s directive is not to issue such letters for emotional support purposes.  Her fibromyalgia will not be a qualifying diagnosis as well.  She can verify this with the clinic supervisor.

## 2019-11-25 NOTE — TELEPHONE ENCOUNTER
Discussed with patient   States she was already notified of this back in March     Jacquelyn CHANCE RN

## 2019-12-04 ENCOUNTER — OFFICE VISIT (OUTPATIENT)
Dept: FAMILY MEDICINE | Facility: CLINIC | Age: 49
End: 2019-12-04
Payer: COMMERCIAL

## 2019-12-04 ENCOUNTER — HOSPITAL ENCOUNTER (OUTPATIENT)
Dept: MAMMOGRAPHY | Facility: CLINIC | Age: 49
Discharge: HOME | End: 2019-12-04
Attending: INTERNAL MEDICINE | Admitting: INTERNAL MEDICINE
Payer: COMMERCIAL

## 2019-12-04 VITALS
OXYGEN SATURATION: 96 % | BODY MASS INDEX: 32.48 KG/M2 | HEART RATE: 95 BPM | TEMPERATURE: 99.1 F | WEIGHT: 183.3 LBS | HEIGHT: 63 IN | DIASTOLIC BLOOD PRESSURE: 85 MMHG | SYSTOLIC BLOOD PRESSURE: 121 MMHG

## 2019-12-04 DIAGNOSIS — E66.811 CLASS 1 OBESITY WITHOUT SERIOUS COMORBIDITY WITH BODY MASS INDEX (BMI) OF 32.0 TO 32.9 IN ADULT, UNSPECIFIED OBESITY TYPE: Primary | ICD-10-CM

## 2019-12-04 DIAGNOSIS — J01.00 SUBACUTE MAXILLARY SINUSITIS: ICD-10-CM

## 2019-12-04 DIAGNOSIS — Z12.31 VISIT FOR SCREENING MAMMOGRAM: ICD-10-CM

## 2019-12-04 PROCEDURE — 77063 BREAST TOMOSYNTHESIS BI: CPT

## 2019-12-04 PROCEDURE — 99214 OFFICE O/P EST MOD 30 MIN: CPT | Performed by: INTERNAL MEDICINE

## 2019-12-04 RX ORDER — PHENTERMINE HYDROCHLORIDE 15 MG/1
15 CAPSULE ORAL EVERY MORNING
Qty: 30 CAPSULE | Refills: 2 | Status: SHIPPED | OUTPATIENT
Start: 2019-12-04 | End: 2020-05-08

## 2019-12-04 RX ORDER — CEFDINIR 300 MG/1
300 CAPSULE ORAL 2 TIMES DAILY
Qty: 10 CAPSULE | Refills: 0 | Status: SHIPPED | OUTPATIENT
Start: 2019-12-04 | End: 2019-12-06

## 2019-12-04 ASSESSMENT — ENCOUNTER SYMPTOMS
TROUBLE SWALLOWING: 0
HEADACHES: 1
SINUS PAIN: 1
SORE THROAT: 1
FEVER: 0
LIGHT-HEADEDNESS: 0
VOICE CHANGE: 0
SLEEP DISTURBANCE: 0
DIARRHEA: 0
NAUSEA: 0
ABDOMINAL PAIN: 0
NERVOUS/ANXIOUS: 0
CHILLS: 1
WEAKNESS: 0
SINUS PRESSURE: 1
MYALGIAS: 0
PALPITATIONS: 0
VOMITING: 0
FATIGUE: 1
COUGH: 1
SHORTNESS OF BREATH: 0

## 2019-12-04 ASSESSMENT — MIFFLIN-ST. JEOR: SCORE: 1407.63

## 2019-12-04 NOTE — PROGRESS NOTES
Subjective     Maegan Lira is a 49 year old female who presents to clinic today for the following health issues:    HPI     RESPIRATORY SYMPTOMS    Duration: 3 weeks    Description:  maxillary sinus pain    Severity: moderate    Accompanying signs and symptoms: nasal congestion, rhinorrhea, sore throat, cough, chills, ear pain bilateral, headache, fatigue/malaise and hoarse voice    History (predisposing factors):  none    Precipitating or alleviating factors: none    Therapies tried and outcome:  oral decongestant acetaminophen mucinex -- temporary relief      I last saw the patient at the clinic on 9/24/2019 for weight management.  Advised patient to continue her low calorie meal plan and her phentermine 15 mg daily which she has tolerated well.     Since her last clinic visit, she has lost 6 pounds; bringing her total weight loss to 24 pounds since we started her weight management on 6/13/2019.  Her BMI has decreased from 37 to 32.    Patient states that she actually lost 9 pounds, but gained 3 pounds back as she celebrated her birthday and visited with her in-laws (where there was reportedly a lot of high calorie food).  Nevertheless, she is pleased with her continued weight loss and states that the current dose of phentermine works well to suppress her appetite.      Past Medical History:   Diagnosis Date     Colon polyps     colonoscopy x 2  last year clear      Fibromyalgia      Herniated lumbar disc without myelopathy     MRI X 3 / hospitalized x 2 /pain management      Low back pain      Migraine     since age 18     Recurrent sinus infections        Review of Systems   Constitutional: Positive for chills and fatigue. Negative for fever.   HENT: Positive for congestion, ear pain, postnasal drip, sinus pressure, sinus pain and sore throat. Negative for ear discharge, hearing loss, trouble swallowing and voice change.    Eyes: Negative for visual disturbance.   Respiratory: Positive for cough. Negative  "for shortness of breath.    Cardiovascular: Negative for chest pain and palpitations.   Gastrointestinal: Negative for abdominal pain, diarrhea, nausea and vomiting.   Musculoskeletal: Negative for myalgias.   Skin: Negative for rash.   Neurological: Positive for headaches. Negative for weakness and light-headedness.   Psychiatric/Behavioral: Negative for sleep disturbance. The patient is not nervous/anxious.        /85 (BP Location: Left arm, Patient Position: Sitting, Cuff Size: Adult Large)   Pulse 95   Temp 99.1  F (37.3  C) (Tympanic)   Ht 1.588 m (5' 2.5\")   Wt 83.1 kg (183 lb 4.8 oz)   SpO2 96%   BMI 32.99 kg/m        Physical Exam  Vitals signs reviewed.   Constitutional:       General: She is not in acute distress.  Neck:      Thyroid: No thyromegaly.   Cardiovascular:      Rate and Rhythm: Normal rate and regular rhythm.      Heart sounds: Normal heart sounds.   Pulmonary:      Effort: Pulmonary effort is normal. No respiratory distress.      Breath sounds: Normal breath sounds.   Abdominal:      Palpations: Abdomen is soft.      Tenderness: There is no abdominal tenderness.   Neurological:      Mental Status: She is alert and oriented to person, place, and time.      Coordination: Coordination normal.      Comments: No tremors           ICD-10-CM    1. Class 1 obesity without serious comorbidity with body mass index (BMI) of 32.0 to 32.9 in adult, unspecified obesity type E66.9 phentermine (ADIPEX-P) 15 MG capsule    Z68.32    2. Subacute maxillary sinusitis J01.00 cefdinir (OMNICEF) 300 MG capsule   **Patient is allergic to ampicillin and could not tolerate doxycycline.  States that she had rash with cephalexin.  I am hesitant to use Levaquin as I do not want to potentially aggravate her current musculoskeletal issues.      Patient Instructions   Call doctor if your sinus symptoms persist/worsens, or if you develop new symptoms or side effects from the antibiotic.    Strictly count/monitor " your calories with every meal/snack every day.    Maintain your low-calorie meal plan.    Monitor your weight once a week and set a weight-loss goal of at least 1-2 pounds per week.    Follow up in 6 months.

## 2019-12-04 NOTE — PATIENT INSTRUCTIONS
Call doctor if your sinus symptoms persist/worsens, or if you develop new symptoms or side effects from the antibiotic.    Strictly count/monitor your calories with every meal/snack every day.    Maintain your low-calorie meal plan.    Monitor your weight once a week and set a weight-loss goal of at least 1-2 pounds per week.    Follow up in 6 months.

## 2019-12-06 ENCOUNTER — MYC MEDICAL ADVICE (OUTPATIENT)
Dept: FAMILY MEDICINE | Facility: CLINIC | Age: 49
End: 2019-12-06

## 2019-12-06 DIAGNOSIS — J01.00 SUBACUTE MAXILLARY SINUSITIS: ICD-10-CM

## 2019-12-06 RX ORDER — CEFDINIR 300 MG/1
300 CAPSULE ORAL 2 TIMES DAILY
Qty: 10 CAPSULE | Refills: 0 | Status: SHIPPED | OUTPATIENT
Start: 2019-12-06 | End: 2020-02-17

## 2019-12-06 NOTE — TELEPHONE ENCOUNTER
Dr Jordan,  [facility].  Thanks,  Farzana JOHNSON RN

## 2019-12-13 ENCOUNTER — TELEPHONE (OUTPATIENT)
Dept: RHEUMATOLOGY | Facility: CLINIC | Age: 49
End: 2019-12-13

## 2019-12-13 NOTE — TELEPHONE ENCOUNTER
Referral from Bartolo Jordan for Sjogren's. Chart has been prepared for Rheumatologist review. Review process can take 1-2 weeks.   Sonia John CMA   12/13/2019 4:42 PM

## 2019-12-13 NOTE — LETTER
December 27, 2019    Maegan Lira  [address]  [place] [address]    Dear Referring Provider,  Thank you for the referral. After careful review by the Rheumatologist, your patient does not meet the criteria for an appointment. We encourage you to refer your patient to one of our community sites:    [facility]    Provider of your choice  Thank you for your support and understanding.    Sincerely,  The Division of Arthritis and Autoimmune Disorders

## 2019-12-27 NOTE — TELEPHONE ENCOUNTER
Rheumatologist has reviewed chart and has made the determination that the patient does not meet the criteria for an appointment.  A letter has been sent the referring encouraging them to send the patient to one of our community sites. Patient needs to follow up with their referring or PCP for further direction.     Sonia John CMA   12/27/2019 1:07 PM

## 2020-01-21 ENCOUNTER — TELEPHONE (OUTPATIENT)
Dept: FAMILY MEDICINE | Facility: CLINIC | Age: 50
End: 2020-01-21

## 2020-01-21 DIAGNOSIS — M79.7 FIBROMYALGIA: Primary | ICD-10-CM

## 2020-01-21 RX ORDER — TIZANIDINE 2 MG/1
2 TABLET ORAL 3 TIMES DAILY
Qty: 90 TABLET | Refills: 3 | Status: SHIPPED | OUTPATIENT
Start: 2020-01-21 | End: 2020-06-29

## 2020-01-21 NOTE — TELEPHONE ENCOUNTER
I believe tizanidine was initially stopped to trial other muscle relaxants which ended up being ineffective. I'm ok with her having tizanidine 2mg tid prn at this point. Order placed.      DO [facility]

## 2020-01-21 NOTE — TELEPHONE ENCOUNTER
FYI PCP see Dr. Chirinos's response (he did approve Rx for pt to pharmacy)     Jacquelyn CHANCE RN

## 2020-01-21 NOTE — TELEPHONE ENCOUNTER
tiZANidine (ZANAFLEX) 2 MG capsule (Discontinued) 60 capsule 1 5/29/2019 6/18/2019 --   Sig - Route: Take 1 capsule (2 mg) by mouth 2 times daily as needed - Oral   Class: Local Print   Order: [id number]   Printout Tracking     External Result Report   Pharmacy     [id number] - [place], [place] - [address] AT [facility] [address]   This Order Has Been Discontinued     Order Status Reason By On   Discontinued None Peterson Chirinos MD 6/18/19 1325          Associated Diagnoses     Fibromyalgia [M79.7]             Fax from pharmacy requesting refill of Tizanidine 2mg.  Medication has been stopped by Dr Chirinos but office visit notes gave no reason why medication was stopped.    Per fax from pharmacy, last filled #[phone]    Please send new Rx if appropriate.    LOV 12-4-2019 Jordan  No future OV scheduled    RT Malika (R)

## 2020-02-07 ENCOUNTER — E-VISIT (OUTPATIENT)
Dept: FAMILY MEDICINE | Facility: CLINIC | Age: 50
End: 2020-02-07
Payer: COMMERCIAL

## 2020-02-07 DIAGNOSIS — B37.31 CANDIDIASIS OF VAGINA: ICD-10-CM

## 2020-02-07 DIAGNOSIS — B37.0 ORAL THRUSH: Primary | ICD-10-CM

## 2020-02-07 DIAGNOSIS — B37.31 VAGINA, CANDIDIASIS: ICD-10-CM

## 2020-02-07 PROCEDURE — 99421 OL DIG E/M SVC 5-10 MIN: CPT | Performed by: INTERNAL MEDICINE

## 2020-02-07 RX ORDER — NYSTATIN 100000/ML
500000 SUSPENSION, ORAL (FINAL DOSE FORM) ORAL 4 TIMES DAILY
Qty: 140 ML | Refills: 0 | Status: SHIPPED | OUTPATIENT
Start: 2020-02-07 | End: 2020-02-17

## 2020-02-07 RX ORDER — FLUCONAZOLE 150 MG/1
150 TABLET ORAL ONCE
Qty: 1 TABLET | Refills: 0 | Status: SHIPPED | OUTPATIENT
Start: 2020-02-07 | End: 2020-02-17

## 2020-02-08 ENCOUNTER — NURSE TRIAGE (OUTPATIENT)
Dept: NURSING | Facility: CLINIC | Age: 50
End: 2020-02-08

## 2020-02-08 NOTE — TELEPHONE ENCOUNTER
Junie states that her prescription was sent to [facility] in [place]. She is currently in [place].  [facility] advised her that since she plans to use the same pharmacy in [place], the prescription can easily be transferred to her location. She verbalized understanding. Advised to call back for further concerns.    Bibiana Fraser RN/[place] Nurse Advisor    Reason for Disposition    Caller has medication question only, adult not sick, and triager answers question    Protocols used: MEDICATION QUESTION CALL-A-AH

## 2020-02-14 ENCOUNTER — MYC MEDICAL ADVICE (OUTPATIENT)
Dept: FAMILY MEDICINE | Facility: CLINIC | Age: 50
End: 2020-02-14

## 2020-02-14 DIAGNOSIS — B37.31 VAGINA, CANDIDIASIS: ICD-10-CM

## 2020-02-14 DIAGNOSIS — B37.0 ORAL THRUSH: ICD-10-CM

## 2020-02-14 NOTE — TELEPHONE ENCOUNTER
12/6/19 Omnicef for sinusitis.  E-visit 2/7/20 started Diflucan and Nystatin.    See patient message.   Still having symptoms.    Please advise next step.    Rain Julian RN

## 2020-02-17 RX ORDER — NYSTATIN 100000/ML
500000 SUSPENSION, ORAL (FINAL DOSE FORM) ORAL 4 TIMES DAILY
Qty: 400 ML | Refills: 0 | Status: SHIPPED | OUTPATIENT
Start: 2020-02-17 | End: 2020-07-09

## 2020-02-17 RX ORDER — FLUCONAZOLE 150 MG/1
150 TABLET ORAL
Qty: 3 TABLET | Refills: 0 | Status: SHIPPED | OUTPATIENT
Start: 2020-02-17 | End: 2020-07-09

## 2020-02-24 ENCOUNTER — HEALTH MAINTENANCE LETTER (OUTPATIENT)
Age: 50
End: 2020-02-24

## 2020-03-22 ENCOUNTER — MYC REFILL (OUTPATIENT)
Dept: FAMILY MEDICINE | Facility: CLINIC | Age: 50
End: 2020-03-22

## 2020-03-22 DIAGNOSIS — F41.9 ANXIETY: ICD-10-CM

## 2020-03-24 RX ORDER — HYDROXYZINE HYDROCHLORIDE 25 MG/1
TABLET, FILM COATED ORAL
Qty: 60 TABLET | Refills: 1 | Status: SHIPPED | OUTPATIENT
Start: 2020-03-24 | End: 2020-09-05

## 2020-05-06 ENCOUNTER — MYC MEDICAL ADVICE (OUTPATIENT)
Dept: FAMILY MEDICINE | Facility: CLINIC | Age: 50
End: 2020-05-06

## 2020-05-06 DIAGNOSIS — E66.811 CLASS 1 OBESITY WITHOUT SERIOUS COMORBIDITY WITH BODY MASS INDEX (BMI) OF 32.0 TO 32.9 IN ADULT, UNSPECIFIED OBESITY TYPE: ICD-10-CM

## 2020-05-07 RX ORDER — PHENTERMINE HYDROCHLORIDE 15 MG/1
15 CAPSULE ORAL EVERY MORNING
Qty: 30 CAPSULE | Refills: 2 | Status: CANCELLED | OUTPATIENT
Start: 2020-05-07

## 2020-05-08 RX ORDER — PHENTERMINE HYDROCHLORIDE 30 MG/1
30 CAPSULE ORAL EVERY MORNING
Qty: 30 CAPSULE | Refills: 0 | Status: SHIPPED | OUTPATIENT
Start: 2020-05-08 | End: 2020-06-16

## 2020-06-13 ENCOUNTER — MYC MEDICAL ADVICE (OUTPATIENT)
Dept: FAMILY MEDICINE | Facility: CLINIC | Age: 50
End: 2020-06-13

## 2020-06-13 DIAGNOSIS — E66.811 CLASS 1 OBESITY WITHOUT SERIOUS COMORBIDITY WITH BODY MASS INDEX (BMI) OF 32.0 TO 32.9 IN ADULT, UNSPECIFIED OBESITY TYPE: ICD-10-CM

## 2020-06-15 NOTE — TELEPHONE ENCOUNTER
DR. Jordan,     Please see pt's message. Pended script for your review of the 30mg.    Kayla Greer RN

## 2020-06-16 RX ORDER — PHENTERMINE HYDROCHLORIDE 30 MG/1
30 CAPSULE ORAL EVERY MORNING
Qty: 30 CAPSULE | Refills: 0 | Status: SHIPPED | OUTPATIENT
Start: 2020-06-16 | End: 2020-07-09 | Stop reason: ALTCHOICE

## 2020-06-29 DIAGNOSIS — M79.7 FIBROMYALGIA: ICD-10-CM

## 2020-06-29 RX ORDER — TIZANIDINE 2 MG/1
2 TABLET ORAL 3 TIMES DAILY
Qty: 90 TABLET | Refills: 3 | Status: SHIPPED | OUTPATIENT
Start: 2020-06-29

## 2020-06-29 NOTE — TELEPHONE ENCOUNTER
Received fax request from [facility] pharmacy requesting refill(s) for tiZANidine (ZANAFLEX) 2 MG tablet    Last refilled on 06/04/20    Pt last seen on 09/24/19  Next appt scheduled for : none    Will facilitate refill.     Patient unable to complete

## 2020-07-09 ENCOUNTER — VIRTUAL VISIT (OUTPATIENT)
Dept: FAMILY MEDICINE | Facility: CLINIC | Age: 50
End: 2020-07-09
Payer: COMMERCIAL

## 2020-07-09 ENCOUNTER — MYC MEDICAL ADVICE (OUTPATIENT)
Dept: FAMILY MEDICINE | Facility: CLINIC | Age: 50
End: 2020-07-09

## 2020-07-09 ENCOUNTER — TELEPHONE (OUTPATIENT)
Dept: FAMILY MEDICINE | Facility: CLINIC | Age: 50
End: 2020-07-09

## 2020-07-09 ENCOUNTER — E-VISIT (OUTPATIENT)
Dept: FAMILY MEDICINE | Facility: CLINIC | Age: 50
End: 2020-07-09
Payer: COMMERCIAL

## 2020-07-09 DIAGNOSIS — M35.01 SJOGREN'S SYNDROME WITH KERATOCONJUNCTIVITIS SICCA (H): ICD-10-CM

## 2020-07-09 DIAGNOSIS — E66.01 MORBID OBESITY (H): ICD-10-CM

## 2020-07-09 DIAGNOSIS — G43.909 MIGRAINE WITHOUT STATUS MIGRAINOSUS, NOT INTRACTABLE, UNSPECIFIED MIGRAINE TYPE: ICD-10-CM

## 2020-07-09 DIAGNOSIS — Z53.9 ERRONEOUS ENCOUNTER--DISREGARD: Primary | ICD-10-CM

## 2020-07-09 DIAGNOSIS — M79.7 FIBROMYALGIA: ICD-10-CM

## 2020-07-09 DIAGNOSIS — F33.42 RECURRENT MAJOR DEPRESSIVE DISORDER, IN FULL REMISSION (H): ICD-10-CM

## 2020-07-09 PROCEDURE — 99215 OFFICE O/P EST HI 40 MIN: CPT | Mod: 95 | Performed by: INTERNAL MEDICINE

## 2020-07-09 RX ORDER — BUTALBITAL, ACETAMINOPHEN AND CAFFEINE 300; 40; 50 MG/1; MG/1; MG/1
CAPSULE ORAL
Qty: 30 CAPSULE | Refills: 1 | Status: SHIPPED | OUTPATIENT
Start: 2020-07-09

## 2020-07-09 RX ORDER — DIAZEPAM 5 MG
5 TABLET ORAL DAILY PRN
Qty: 30 TABLET | Refills: 0 | Status: SHIPPED | OUTPATIENT
Start: 2020-07-09

## 2020-07-09 RX ORDER — PHENTERMINE HYDROCHLORIDE 37.5 MG/1
37.5 TABLET ORAL
Qty: 30 TABLET | Refills: 1 | Status: SHIPPED | OUTPATIENT
Start: 2020-07-09 | End: 2020-09-05

## 2020-07-09 RX ORDER — TRAMADOL HYDROCHLORIDE 50 MG/1
50 TABLET ORAL EVERY 6 HOURS PRN
Qty: 30 TABLET | Refills: 0 | Status: SHIPPED | OUTPATIENT
Start: 2020-07-09

## 2020-07-09 ASSESSMENT — PATIENT HEALTH QUESTIONNAIRE - PHQ9: SUM OF ALL RESPONSES TO PHQ QUESTIONS 1-9: 1

## 2020-07-09 NOTE — TELEPHONE ENCOUNTER
[employer] message sent to patient to please schedule a virtual visit to complete disability forms.   Jenny Jeff MA

## 2020-07-09 NOTE — PROGRESS NOTES
"Maegan Lira is a 49 year old female who is being evaluated via a billable video visit.      The patient has been notified of following:     \"This video visit will be conducted via a call between you and your physician/provider. We have found that certain health care needs can be provided without the need for an in-person physical exam.  This service lets us provide the care you need with a video conversation.  If a prescription is necessary we can send it directly to your pharmacy.  If lab work is needed we can place an order for that and you can then stop by our lab to have the test done at a later time.    Video visits are billed at different rates depending on your insurance coverage.  Please reach out to your insurance provider with any questions.    If during the course of the call the physician/provider feels a video visit is not appropriate, you will not be charged for this service.\"    Patient has given verbal consent for Video visit? Yes      Video-Visit Details    Video Start Time: 5:31 pm  Video End Time: 6:16 pm    Originating Location (pt. Location): home    Distant Location (provider location[facility]     Platform used for Video Visit: Health Integrated (Am.Well)      HPI    Patient is following up for her fibromyalgia, peripheral neuropathy, and sjogren's syndrome.      She is needing disability forms filled out.    Follows up with:  -- chiropracter (Dr. Naeem Bridges)  -- pain specialist (Dr. Chirinos)  -- rheumatology (Dr. Barker)  -- massage therapist (Wendie Pond, [facility]; Kelsey Angel [facility])    She gets massage therapy every 2 weeks, the last one being on 7/6/20.    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  She previously had to physically visit houses going up ladders and taking measurements to make do her " appraisals -- she can no longer do these due to her medical conditions.    Migraine headaches are relieved by PRN Fioricet.    Continues to do well with her weight management on Phentermine.    Depression and anxiety stable.    Still gets dry eyes from her Sjogren's especially with prolonged screen-time.      Review of Systems   Constitutional: Positive for fatigue (occasional). Negative for unexpected weight change.   Eyes: Negative for pain, redness and visual disturbance.   Respiratory: Negative for cough and shortness of breath.    Cardiovascular: Negative for chest pain, palpitations and leg swelling.   Gastrointestinal: Negative for abdominal pain, nausea and vomiting.   Neurological: Positive for headaches (ocasional from migraines).   Psychiatric/Behavioral: Negative for dysphoric mood. The patient is not nervous/anxious.          ICD-10-CM    1. Fibromyalgia  M79.7 diazepam (VALIUM) 5 MG tablet     traMADol (ULTRAM) 50 MG tablet   2. Migraine without status migrainosus, not intractable, unspecified migraine type  G43.909 Butalbital-APAP-Caffeine -40 MG CAPS   3. Recurrent major depressive disorder, in full remission (H)  F33.42 Cymbalta 90mg daily   4. Sjogren's syndrome with keratoconjunctivitis sicca (H)  M35.01 PRN artificial tears   5. Morbid obesity (H)  E66.01 con't phentermine       *45 minutes were spent with the patient, more than half of which was spent on counseling and coordination of care      Dr. Bartolo Jordan

## 2020-07-09 NOTE — TELEPHONE ENCOUNTER
Patient submitted an E-visit requesting that her renewal papers for her disability be completed.  Please inform patient that as per clinic protocol these kinds of requests will require a visit and then please schedule for virtual visit.

## 2020-07-09 NOTE — TELEPHONE ENCOUNTER
DRUG CHANGE REQUEST:    BUT/ACETAMINOPHEN CAFF -40 CP  Is not covered by patient plan. Alternative is SUMATRIPTANSUCCINATE, IBUPROFEN, RIZATRIPTAN.    Please send new RX.

## 2020-07-10 NOTE — TELEPHONE ENCOUNTER
Central Prior Authorization Team  Phone: [phone]    PA Initiation    Medication: Butalbital/Acetaminophen Caffeine -40  Insurance Company: [employer] - Phone [phone] Fax [phone]  Pharmacy Filling the Rx: [facility] #[id number] - [place], [place] - [address] & [facility]  Filling Pharmacy Phone: [phone]  Filling Pharmacy Fax:    Start Date: 7/10/2020

## 2020-07-10 NOTE — TELEPHONE ENCOUNTER
Prior Authorization Retail Medication Request    Medication/Dose: Butalbital/Acetaminophen Caffeine -40  ICD code (if different than what is on RX):  G43.909  Previously Tried and Failed:  Sumatriptan & Capsaicin-Menthol  Rationale:  Patient stable on medication and takes very infrequently    Insurance Name:  [employer]  Insurance ID:  [id number]      Pharmacy Information (if different than what is on RX)  Name:  [facility] #[id number]  Phone:  [phone]

## 2020-07-13 NOTE — TELEPHONE ENCOUNTER
Prior Authorization Approval    Authorization Effective Date: 6/10/2020  Authorization Expiration Date: 7/11/2021  Medication: Butalbital/Acetaminophen Caffeine -40- APPROVED   Approved Dose/Quantity:   Reference #:     Insurance Company: [employer] - Phone [phone] Fax [phone]  Expected CoPay:       CoPay Card Available:      Foundation Assistance Needed:    Which Pharmacy is filling the prescription (Not needed for infusion/clinic administered): [id number] - [place], [place] - [address] [facility] AT [facility]  Pharmacy Notified: Yes- medication went through insur and pt already picked it up.  Patient Notified: Comment:

## 2020-07-20 ENCOUNTER — MYC MEDICAL ADVICE (OUTPATIENT)
Dept: FAMILY MEDICINE | Facility: CLINIC | Age: 50
End: 2020-07-20

## 2020-07-23 NOTE — TELEPHONE ENCOUNTER
Please see message from patient today.  Where is the paperwork she is looking for?     Jenny Jeff MA

## 2020-07-27 ASSESSMENT — ENCOUNTER SYMPTOMS
DYSPHORIC MOOD: 0
NERVOUS/ANXIOUS: 0
ABDOMINAL PAIN: 0
SHORTNESS OF BREATH: 0
HEADACHES: 1
COUGH: 0
EYE PAIN: 0
FATIGUE: 1
UNEXPECTED WEIGHT CHANGE: 0
PALPITATIONS: 0
EYE REDNESS: 0
NAUSEA: 0
VOMITING: 0

## 2020-08-17 DIAGNOSIS — M79.7 FIBROMYALGIA: ICD-10-CM

## 2020-08-17 RX ORDER — DULOXETIN HYDROCHLORIDE 30 MG/1
90 CAPSULE, DELAYED RELEASE ORAL DAILY
Qty: 270 CAPSULE | Refills: 3 | Status: SHIPPED | OUTPATIENT
Start: 2020-08-17 | End: 2020-08-27

## 2020-08-17 NOTE — TELEPHONE ENCOUNTER
Received fax request from [facility] pharmacy requesting refill(s) for DULoxetine (CYMBALTA) 30 MG capsule    Last refilled on 05/26/20    Pt last seen on 09/27/19  Next appt scheduled for : none    Will facilitate refill.

## 2020-08-17 NOTE — TELEPHONE ENCOUNTER
This is a patient of Dr. Chirinos's. She has not been seen by him since 9/24/2019. Last refill is noted to be on 5/26/2020. Please call pharmacy to confirm who most recently is prescribing this for her and route refill to that provider. She should schedule a follow up with Dr. Chirinos if she is wanting to continue working with the pain clinic.     Brooke Loja MD  [facility]

## 2020-08-17 NOTE — TELEPHONE ENCOUNTER
Called [facility] in [facility].  Spoke with pharmacist.  Advised pt has not been seen in a year.  Dr Chirinos has not prescribe this for her in a long time.  They will contact the PCP for the refill on the Cymbalta.  Rx  Not filled.    Katie Shepherd RN  Care Coordinator  [facility]

## 2020-08-26 DIAGNOSIS — M79.7 FIBROMYALGIA: ICD-10-CM

## 2020-08-27 RX ORDER — DULOXETIN HYDROCHLORIDE 30 MG/1
90 CAPSULE, DELAYED RELEASE ORAL DAILY
Qty: 270 CAPSULE | Refills: 1 | Status: SHIPPED | OUTPATIENT
Start: 2020-08-27

## 2020-09-05 ENCOUNTER — MYC REFILL (OUTPATIENT)
Dept: FAMILY MEDICINE | Facility: CLINIC | Age: 50
End: 2020-09-05

## 2020-09-05 DIAGNOSIS — E66.811 CLASS 1 OBESITY WITHOUT SERIOUS COMORBIDITY WITH BODY MASS INDEX (BMI) OF 32.0 TO 32.9 IN ADULT, UNSPECIFIED OBESITY TYPE: Primary | ICD-10-CM

## 2020-09-05 DIAGNOSIS — F41.9 ANXIETY: ICD-10-CM

## 2020-09-08 RX ORDER — PHENTERMINE HYDROCHLORIDE 37.5 MG/1
37.5 TABLET ORAL
Qty: 30 TABLET | Refills: 1 | Status: SHIPPED | OUTPATIENT
Start: 2020-09-08

## 2020-09-08 RX ORDER — HYDROXYZINE HYDROCHLORIDE 25 MG/1
TABLET, FILM COATED ORAL
Qty: 60 TABLET | Refills: 1 | Status: SHIPPED | OUTPATIENT
Start: 2020-09-08

## 2020-12-13 ENCOUNTER — HEALTH MAINTENANCE LETTER (OUTPATIENT)
Age: 50
End: 2020-12-13

## 2021-02-21 ENCOUNTER — HEALTH MAINTENANCE LETTER (OUTPATIENT)
Age: 51
End: 2021-02-21

## 2021-04-17 ENCOUNTER — HEALTH MAINTENANCE LETTER (OUTPATIENT)
Age: 51
End: 2021-04-17

## 2021-09-26 ENCOUNTER — HEALTH MAINTENANCE LETTER (OUTPATIENT)
Age: 51
End: 2021-09-26

## 2022-03-13 ENCOUNTER — HEALTH MAINTENANCE LETTER (OUTPATIENT)
Age: 52
End: 2022-03-13

## 2022-05-08 ENCOUNTER — HEALTH MAINTENANCE LETTER (OUTPATIENT)
Age: 52
End: 2022-05-08

## 2023-04-23 ENCOUNTER — HEALTH MAINTENANCE LETTER (OUTPATIENT)
Age: 53
End: 2023-04-23

## 2023-06-02 ENCOUNTER — HEALTH MAINTENANCE LETTER (OUTPATIENT)
Age: 53
End: 2023-06-02